# Patient Record
Sex: MALE | Race: WHITE | NOT HISPANIC OR LATINO | URBAN - METROPOLITAN AREA
[De-identification: names, ages, dates, MRNs, and addresses within clinical notes are randomized per-mention and may not be internally consistent; named-entity substitution may affect disease eponyms.]

---

## 2019-01-14 ENCOUNTER — APPOINTMENT (RX ONLY)
Dept: URBAN - METROPOLITAN AREA CLINIC 4 | Facility: CLINIC | Age: 74
Setting detail: DERMATOLOGY
End: 2019-01-14

## 2019-01-14 DIAGNOSIS — D485 NEOPLASM OF UNCERTAIN BEHAVIOR OF SKIN: ICD-10-CM

## 2019-01-14 DIAGNOSIS — Z85.828 PERSONAL HISTORY OF OTHER MALIGNANT NEOPLASM OF SKIN: ICD-10-CM

## 2019-01-14 DIAGNOSIS — L82.0 INFLAMED SEBORRHEIC KERATOSIS: ICD-10-CM

## 2019-01-14 DIAGNOSIS — D18.0 HEMANGIOMA: ICD-10-CM

## 2019-01-14 PROBLEM — D48.5 NEOPLASM OF UNCERTAIN BEHAVIOR OF SKIN: Status: ACTIVE | Noted: 2019-01-14

## 2019-01-14 PROBLEM — D23.39 OTHER BENIGN NEOPLASM OF SKIN OF OTHER PARTS OF FACE: Status: ACTIVE | Noted: 2019-01-14

## 2019-01-14 PROBLEM — D18.01 HEMANGIOMA OF SKIN AND SUBCUTANEOUS TISSUE: Status: ACTIVE | Noted: 2019-01-14

## 2019-01-14 PROCEDURE — 99213 OFFICE O/P EST LOW 20 MIN: CPT | Mod: 25

## 2019-01-14 PROCEDURE — ? LIQUID NITROGEN

## 2019-01-14 PROCEDURE — 11103 TANGNTL BX SKIN EA SEP/ADDL: CPT

## 2019-01-14 PROCEDURE — ? COUNSELING

## 2019-01-14 PROCEDURE — 17110 DESTRUCTION B9 LES UP TO 14: CPT

## 2019-01-14 PROCEDURE — 11102 TANGNTL BX SKIN SINGLE LES: CPT | Mod: 59

## 2019-01-14 PROCEDURE — ? BIOPSY BY SHAVE METHOD

## 2019-01-14 ASSESSMENT — LOCATION SIMPLE DESCRIPTION DERM
LOCATION SIMPLE: LEFT SHOULDER
LOCATION SIMPLE: LEFT FOREHEAD
LOCATION SIMPLE: ABDOMEN
LOCATION SIMPLE: RIGHT SHOULDER
LOCATION SIMPLE: RIGHT FOREHEAD
LOCATION SIMPLE: RIGHT FOREARM

## 2019-01-14 ASSESSMENT — LOCATION DETAILED DESCRIPTION DERM
LOCATION DETAILED: PERIUMBILICAL SKIN
LOCATION DETAILED: LEFT LATERAL FOREHEAD
LOCATION DETAILED: RIGHT ANTERIOR SHOULDER
LOCATION DETAILED: RIGHT PROXIMAL DORSAL FOREARM
LOCATION DETAILED: RIGHT INFERIOR FOREHEAD
LOCATION DETAILED: LEFT INFERIOR FOREHEAD
LOCATION DETAILED: RIGHT LATERAL ABDOMEN
LOCATION DETAILED: LEFT POSTERIOR SHOULDER

## 2019-01-14 ASSESSMENT — LOCATION ZONE DERM
LOCATION ZONE: TRUNK
LOCATION ZONE: FACE
LOCATION ZONE: ARM

## 2019-01-14 NOTE — PROCEDURE: BIOPSY BY SHAVE METHOD
Consent: Written consent was obtained and risks were reviewed including but not limited to scarring, infection, bleeding, scabbing, incomplete removal, nerve damage and allergy to anesthesia.
Detail Level: Detailed
Lab: [id number]
Bill For Surgical Tray: no
Size Of Lesion In Cm: 0
Lab Facility: 
Cryotherapy Text: The wound bed was treated with cryotherapy after the biopsy was performed.
Anesthesia Type: 1% lidocaine with epinephrine
Wound Care: Petrolatum
Biopsy Type: H and E
Billing Type: Third-Party Bill
Depth Of Biopsy: dermis
Electrodesiccation Text: The wound bed was treated with electrodesiccation after the biopsy was performed.
Electrodesiccation And Curettage Text: The wound bed was treated with electrodesiccation and curettage after the biopsy was performed.
Type Of Destruction Used: Curettage
Anesthesia Volume In Cc: 0.5
Dressing: bandage
Post-Care Instructions: I reviewed with the patient in detail post-care instructions. Patient is to keep the biopsy site dry overnight, and then apply bacitracin twice daily until healed. Patient may apply hydrogen peroxide soaks to remove any crusting.
Notification Instructions: Patient will be notified of biopsy results. However, patient instructed to call the office if not contacted within 2 weeks.
Was A Bandage Applied: Yes
Silver Nitrate Text: The wound bed was treated with silver nitrate after the biopsy was performed.
Curettage Text: The wound bed was treated with curettage after the biopsy was performed.
Hemostasis: Drysol
Biopsy Method: Personna blade

## 2019-02-15 ENCOUNTER — APPOINTMENT (RX ONLY)
Dept: URBAN - METROPOLITAN AREA CLINIC 4 | Facility: CLINIC | Age: 74
Setting detail: DERMATOLOGY
End: 2019-02-15

## 2019-02-15 DIAGNOSIS — L57.0 ACTINIC KERATOSIS: ICD-10-CM

## 2019-02-15 PROBLEM — C44.612 BASAL CELL CARCINOMA OF SKIN OF RIGHT UPPER LIMB, INCLUDING SHOULDER: Status: ACTIVE | Noted: 2019-02-15

## 2019-02-15 PROCEDURE — 17000 DESTRUCT PREMALG LESION: CPT

## 2019-02-15 PROCEDURE — ? CURETTAGE AND DESTRUCTION

## 2019-02-15 PROCEDURE — ? LIQUID NITROGEN

## 2019-02-15 PROCEDURE — 17262 DSTRJ MAL LES T/A/L 1.1-2.0: CPT | Mod: 59

## 2019-02-15 ASSESSMENT — LOCATION ZONE DERM: LOCATION ZONE: ARM

## 2019-02-15 ASSESSMENT — LOCATION SIMPLE DESCRIPTION DERM: LOCATION SIMPLE: RIGHT SHOULDER

## 2019-02-15 ASSESSMENT — LOCATION DETAILED DESCRIPTION DERM: LOCATION DETAILED: RIGHT POSTERIOR SHOULDER

## 2019-02-15 NOTE — PROCEDURE: CURETTAGE AND DESTRUCTION
Anesthesia Volume In Cc: 0.5
What Was Performed First?: Curettage
Add Intralesional Injection: No
Bill As A Line Item Or As Units: Line Item
Size Of Lesion In Cm: 0.8
Detail Level: Detailed
Cautery Type: electrodesiccation
Post-Care Instructions: I reviewed with the patient in detail post-care instructions. Patient is to keep the area dry for 48 hours, and not to engage in any swimming until the area is healed. Should the patient develop any fevers, chills, bleeding, severe pain patient will contact the office immediately.
Biopsy Photograph Reviewed: Yes
Additional Information: (Optional): The wound was cleaned, and a pressure dressing was applied.  The patient received detailed post-op instructions.
Anesthesia Type: 1% lidocaine with epinephrine
Total Volume (Ccs): 1
Consent was obtained from the patient. The risks, benefits and alternatives to therapy were discussed in detail. Specifically, the risks of infection, scarring, bleeding, prolonged wound healing, nerve injury, incomplete removal, allergy to anesthesia and recurrence were addressed. Alternatives to ED&C, such as: surgical removal and XRT were also discussed.  Prior to the procedure, the treatment site was clearly identified and confirmed by the patient. All components of Universal Protocol/PAUSE Rule completed.
Concentration (Mg/Ml Or Millions Of Plaque Forming Units/Cc): 0.01
Number Of Curettages: 2
Size Of Lesion After Curettage: 1.2

## 2019-02-15 NOTE — PROCEDURE: LIQUID NITROGEN
Number Of Freeze-Thaw Cycles: 2 freeze-thaw cycles
Post-Care Instructions: I reviewed with the patient in detail post-care instructions. Patient is to wear sunprotection, and avoid picking at any of the treated lesions. Pt may apply Vaseline to crusted or scabbing areas.
Detail Level: Detailed
Render Post-Care Instructions In Note?: no
Consent: The patient's consent was obtained including but not limited to risks of blistering, scarring, darker or lighter pigmentary change, and recurrence.
Duration Of Freeze Thaw-Cycle (Seconds): 0

## 2019-05-30 ENCOUNTER — APPOINTMENT (RX ONLY)
Dept: URBAN - METROPOLITAN AREA CLINIC 4 | Facility: CLINIC | Age: 74
Setting detail: DERMATOLOGY
End: 2019-05-30

## 2019-05-30 DIAGNOSIS — Z85.828 PERSONAL HISTORY OF OTHER MALIGNANT NEOPLASM OF SKIN: ICD-10-CM

## 2019-05-30 DIAGNOSIS — L82.1 OTHER SEBORRHEIC KERATOSIS: ICD-10-CM

## 2019-05-30 DIAGNOSIS — L57.0 ACTINIC KERATOSIS: ICD-10-CM

## 2019-05-30 PROBLEM — D48.5 NEOPLASM OF UNCERTAIN BEHAVIOR OF SKIN: Status: ACTIVE | Noted: 2019-05-30

## 2019-05-30 PROCEDURE — ? LIQUID NITROGEN

## 2019-05-30 PROCEDURE — 17003 DESTRUCT PREMALG LES 2-14: CPT

## 2019-05-30 PROCEDURE — ? COUNSELING

## 2019-05-30 PROCEDURE — 11102 TANGNTL BX SKIN SINGLE LES: CPT

## 2019-05-30 PROCEDURE — ? BIOPSY BY SHAVE METHOD

## 2019-05-30 PROCEDURE — 99212 OFFICE O/P EST SF 10 MIN: CPT | Mod: 25

## 2019-05-30 PROCEDURE — 17000 DESTRUCT PREMALG LESION: CPT | Mod: 59

## 2019-05-30 ASSESSMENT — LOCATION DETAILED DESCRIPTION DERM
LOCATION DETAILED: RIGHT PROXIMAL DORSAL FOREARM
LOCATION DETAILED: LEFT DISTAL POSTERIOR UPPER ARM
LOCATION DETAILED: LEFT PROXIMAL DORSAL FOREARM
LOCATION DETAILED: LEFT VENTRAL DISTAL FOREARM
LOCATION DETAILED: LEFT CENTRAL MALAR CHEEK

## 2019-05-30 ASSESSMENT — LOCATION ZONE DERM
LOCATION ZONE: FACE
LOCATION ZONE: ARM

## 2019-05-30 ASSESSMENT — LOCATION SIMPLE DESCRIPTION DERM
LOCATION SIMPLE: LEFT UPPER ARM
LOCATION SIMPLE: LEFT FOREARM
LOCATION SIMPLE: LEFT CHEEK
LOCATION SIMPLE: RIGHT FOREARM

## 2019-05-30 NOTE — PROCEDURE: BIOPSY BY SHAVE METHOD
Notification Instructions: Patient will be notified of biopsy results. However, patient instructed to call the office if not contacted within 2 weeks.
Curettage Text: The wound bed was treated with curettage after the biopsy was performed.
Detail Level: Detailed
Hemostasis: Aluminum Chloride and Electrocautery
Post-Care Instructions: I reviewed with the patient in detail post-care instructions. Patient is to keep the biopsy site dry overnight, and then apply bacitracin twice daily until healed. Patient may apply hydrogen peroxide soaks to remove any crusting.
Silver Nitrate Text: The wound bed was treated with silver nitrate after the biopsy was performed.
Depth Of Biopsy: dermis
Lab Facility: 
Additional Anesthesia Volume In Cc (Will Not Render If 0): 0
Biopsy Type: H and E
Consent: Written consent was obtained and risks were reviewed including but not limited to scarring, infection, bleeding, scabbing, incomplete removal, nerve damage and allergy to anesthesia.
Anesthesia Type: 1% lidocaine with epinephrine
Cryotherapy Text: The wound bed was treated with cryotherapy after the biopsy was performed.
Destruction After The Procedure: No
Size Of Lesion In Cm: 1
Lab: [id number]
Electrodesiccation Text: The wound bed was treated with electrodesiccation after the biopsy was performed.
Wound Care: Aquaphor
Dressing: Band-Aid
Biopsy Method: 15 blade
Electrodesiccation And Curettage Text: The wound bed was treated with electrodesiccation and curettage after the biopsy was performed.
Billing Type: Third-Party Bill
Type Of Destruction Used: Curettage
Was A Bandage Applied: Yes

## 2019-05-30 NOTE — PROCEDURE: LIQUID NITROGEN
Render Note In Bullet Format When Appropriate: No
Post-Care Instructions: I reviewed with the patient in detail post-care instructions. Patient is to wear sunprotection, and avoid picking at any of the treated lesions. Pt may apply Vaseline  or Aquaphor to crusted or scabbing areas.
Detail Level: Detailed
Duration Of Freeze Thaw-Cycle (Seconds): 0
Consent: The patient's consent was obtained including but not limited to risks of crusting, scabbing, blistering, scarring, darker or lighter pigmentary change, recurrence, incomplete removal and infection.

## 2019-08-21 ENCOUNTER — APPOINTMENT (RX ONLY)
Dept: URBAN - METROPOLITAN AREA CLINIC 4 | Facility: CLINIC | Age: 74
Setting detail: DERMATOLOGY
End: 2019-08-21

## 2019-08-21 DIAGNOSIS — D18.0 HEMANGIOMA: ICD-10-CM

## 2019-08-21 DIAGNOSIS — L57.8 OTHER SKIN CHANGES DUE TO CHRONIC EXPOSURE TO NONIONIZING RADIATION: ICD-10-CM

## 2019-08-21 DIAGNOSIS — L57.0 ACTINIC KERATOSIS: ICD-10-CM

## 2019-08-21 DIAGNOSIS — L82.0 INFLAMED SEBORRHEIC KERATOSIS: ICD-10-CM

## 2019-08-21 PROBLEM — D23.39 OTHER BENIGN NEOPLASM OF SKIN OF OTHER PARTS OF FACE: Status: ACTIVE | Noted: 2019-08-21

## 2019-08-21 PROBLEM — D18.01 HEMANGIOMA OF SKIN AND SUBCUTANEOUS TISSUE: Status: ACTIVE | Noted: 2019-08-21

## 2019-08-21 PROCEDURE — ? DEFER

## 2019-08-21 PROCEDURE — 17003 DESTRUCT PREMALG LES 2-14: CPT | Mod: 59

## 2019-08-21 PROCEDURE — 99213 OFFICE O/P EST LOW 20 MIN: CPT | Mod: 25

## 2019-08-21 PROCEDURE — 17000 DESTRUCT PREMALG LESION: CPT | Mod: 59

## 2019-08-21 PROCEDURE — 17110 DESTRUCTION B9 LES UP TO 14: CPT

## 2019-08-21 PROCEDURE — ? LIQUID NITROGEN

## 2019-08-21 ASSESSMENT — LOCATION ZONE DERM
LOCATION ZONE: FACE
LOCATION ZONE: TRUNK

## 2019-08-21 ASSESSMENT — LOCATION DETAILED DESCRIPTION DERM
LOCATION DETAILED: SUPERIOR THORACIC SPINE
LOCATION DETAILED: RIGHT CENTRAL EYEBROW
LOCATION DETAILED: RIGHT CENTRAL TEMPLE
LOCATION DETAILED: STERNUM
LOCATION DETAILED: LEFT MEDIAL FOREHEAD

## 2019-08-21 ASSESSMENT — LOCATION SIMPLE DESCRIPTION DERM
LOCATION SIMPLE: CHEST
LOCATION SIMPLE: RIGHT TEMPLE
LOCATION SIMPLE: UPPER BACK
LOCATION SIMPLE: RIGHT EYEBROW
LOCATION SIMPLE: LEFT FOREHEAD

## 2019-08-21 NOTE — PROCEDURE: LIQUID NITROGEN
Detail Level: Detailed
Consent: The patient's consent was obtained including but not limited to risks of crusting, scabbing, blistering, scarring, darker or lighter pigmentary change, recurrence, incomplete removal and infection.
Add 52 Modifier (Optional): no
Medical Necessity Information: It is in your best interest to select a reason for this procedure from the list below. All of these items fulfill various CMS LCD requirements except the new and changing color options.
Medical Necessity Clause: This procedure was medically necessary because the lesions that were treated were:
Post-Care Instructions: I reviewed with the patient in detail post-care instructions. Patient is to wear sunprotection, and avoid picking at any of the treated lesions. Pt may apply Vaseline to crusted or scabbing areas.
Include Z78.9 (Other Specified Conditions Influencing Health Status) As An Associated Diagnosis?: Yes
Duration Of Freeze Thaw-Cycle (Seconds): 0
Consent: The patient's consent was obtained including but not limited to risks of blistering, scarring, darker or lighter pigmentary change, and recurrence.
Number Of Freeze-Thaw Cycles: 2 freeze-thaw cycles

## 2019-08-21 NOTE — PROCEDURE: DEFER
Introduction Text (Please End With A Colon): The following procedure was deferred:
Detail Level: Zone
Instructions (Optional): Mild today, will re-evaluate at follow up in 12 months

## 2020-07-12 ENCOUNTER — APPOINTMENT (OUTPATIENT)
Dept: RADIOLOGY | Facility: MEDICAL CENTER | Age: 75
DRG: 038 | End: 2020-07-12
Attending: PSYCHIATRY & NEUROLOGY
Payer: MEDICARE

## 2020-07-12 ENCOUNTER — APPOINTMENT (OUTPATIENT)
Dept: RADIOLOGY | Facility: MEDICAL CENTER | Age: 75
DRG: 038 | End: 2020-07-12
Attending: EMERGENCY MEDICINE
Payer: MEDICARE

## 2020-07-12 ENCOUNTER — HOSPITAL ENCOUNTER (INPATIENT)
Facility: MEDICAL CENTER | Age: 75
LOS: 2 days | DRG: 038 | End: 2020-07-14
Attending: EMERGENCY MEDICINE | Admitting: HOSPITALIST
Payer: MEDICARE

## 2020-07-12 DIAGNOSIS — R55 SYNCOPE, UNSPECIFIED SYNCOPE TYPE: ICD-10-CM

## 2020-07-12 DIAGNOSIS — I65.21 STENOSIS OF RIGHT CAROTID ARTERY: ICD-10-CM

## 2020-07-12 DIAGNOSIS — G45.9 TIA (TRANSIENT ISCHEMIC ATTACK): ICD-10-CM

## 2020-07-12 DIAGNOSIS — I10 HYPERTENSION, UNSPECIFIED TYPE: ICD-10-CM

## 2020-07-12 LAB
ABO + RH BLD: ABNORMAL
ABO GROUP BLD: ABNORMAL
ALBUMIN SERPL BCP-MCNC: 4.5 G/DL (ref 3.2–4.9)
ALBUMIN/GLOB SERPL: 1.8 G/DL
ALP SERPL-CCNC: 107 U/L (ref 30–99)
ALT SERPL-CCNC: 21 U/L (ref 2–50)
AMMONIA PLAS-SCNC: <10 UMOL/L (ref 11–45)
AMPHET UR QL SCN: NEGATIVE
ANION GAP SERPL CALC-SCNC: 18 MMOL/L (ref 7–16)
APPEARANCE UR: CLEAR
APTT PPP: 25.2 SEC (ref 24.7–36)
AST SERPL-CCNC: 23 U/L (ref 12–45)
BARBITURATES UR QL SCN: NEGATIVE
BASOPHILS # BLD AUTO: 0.4 % (ref 0–1.8)
BASOPHILS # BLD: 0.03 K/UL (ref 0–0.12)
BENZODIAZ UR QL SCN: NEGATIVE
BILIRUB SERPL-MCNC: 0.5 MG/DL (ref 0.1–1.5)
BILIRUB UR QL STRIP.AUTO: NEGATIVE
BLD GP AB SCN SERPL QL: ABNORMAL
BUN SERPL-MCNC: 8 MG/DL (ref 8–22)
BZE UR QL SCN: NEGATIVE
CALCIUM SERPL-MCNC: 9.2 MG/DL (ref 8.5–10.5)
CANNABINOIDS UR QL SCN: NEGATIVE
CHLORIDE SERPL-SCNC: 88 MMOL/L (ref 96–112)
CO2 SERPL-SCNC: 20 MMOL/L (ref 20–33)
COLOR UR: YELLOW
CREAT SERPL-MCNC: 0.83 MG/DL (ref 0.5–1.4)
EKG IMPRESSION: NORMAL
EOSINOPHIL # BLD AUTO: 0.06 K/UL (ref 0–0.51)
EOSINOPHIL NFR BLD: 0.9 % (ref 0–6.9)
ERYTHROCYTE [DISTWIDTH] IN BLOOD BY AUTOMATED COUNT: 40.9 FL (ref 35.9–50)
GLOBULIN SER CALC-MCNC: 2.5 G/DL (ref 1.9–3.5)
GLUCOSE SERPL-MCNC: 151 MG/DL (ref 65–99)
GLUCOSE UR STRIP.AUTO-MCNC: NEGATIVE MG/DL
HCT VFR BLD AUTO: 46.5 % (ref 42–52)
HGB BLD-MCNC: 16 G/DL (ref 14–18)
IMM GRANULOCYTES # BLD AUTO: 0.02 K/UL (ref 0–0.11)
IMM GRANULOCYTES NFR BLD AUTO: 0.3 % (ref 0–0.9)
INR PPP: 0.91 (ref 0.87–1.13)
KETONES UR STRIP.AUTO-MCNC: NEGATIVE MG/DL
LEUKOCYTE ESTERASE UR QL STRIP.AUTO: NEGATIVE
LYMPHOCYTES # BLD AUTO: 0.76 K/UL (ref 1–4.8)
LYMPHOCYTES NFR BLD: 11.3 % (ref 22–41)
MAGNESIUM SERPL-MCNC: 1.9 MG/DL (ref 1.5–2.5)
MCH RBC QN AUTO: 31.1 PG (ref 27–33)
MCHC RBC AUTO-ENTMCNC: 34.4 G/DL (ref 33.7–35.3)
MCV RBC AUTO: 90.5 FL (ref 81.4–97.8)
METHADONE UR QL SCN: NEGATIVE
MICRO URNS: NORMAL
MONOCYTES # BLD AUTO: 0.75 K/UL (ref 0–0.85)
MONOCYTES NFR BLD AUTO: 11.2 % (ref 0–13.4)
NEUTROPHILS # BLD AUTO: 5.09 K/UL (ref 1.82–7.42)
NEUTROPHILS NFR BLD: 75.9 % (ref 44–72)
NITRITE UR QL STRIP.AUTO: NEGATIVE
NRBC # BLD AUTO: 0 K/UL
NRBC BLD-RTO: 0 /100 WBC
OPIATES UR QL SCN: NEGATIVE
OXYCODONE UR QL SCN: NEGATIVE
PCP UR QL SCN: NEGATIVE
PH UR STRIP.AUTO: 8 [PH] (ref 5–8)
PLATELET # BLD AUTO: 188 K/UL (ref 164–446)
PMV BLD AUTO: 9 FL (ref 9–12.9)
POTASSIUM SERPL-SCNC: 4.2 MMOL/L (ref 3.6–5.5)
PROPOXYPH UR QL SCN: NEGATIVE
PROT SERPL-MCNC: 7 G/DL (ref 6–8.2)
PROT UR QL STRIP: NEGATIVE MG/DL
PROTHROMBIN TIME: 12.6 SEC (ref 12–14.6)
RBC # BLD AUTO: 5.14 M/UL (ref 4.7–6.1)
RBC UR QL AUTO: NEGATIVE
RH BLD: ABNORMAL
SODIUM SERPL-SCNC: 126 MMOL/L (ref 135–145)
SP GR UR STRIP.AUTO: 1.03
T4 FREE SERPL-MCNC: 0.85 NG/DL (ref 0.93–1.7)
TROPONIN T SERPL-MCNC: 10 NG/L (ref 6–19)
TSH SERPL DL<=0.005 MIU/L-ACNC: 11.7 UIU/ML (ref 0.38–5.33)
UROBILINOGEN UR STRIP.AUTO-MCNC: 0.2 MG/DL
VIT B12 SERPL-MCNC: 418 PG/ML (ref 211–911)
WBC # BLD AUTO: 6.7 K/UL (ref 4.8–10.8)

## 2020-07-12 PROCEDURE — 94760 N-INVAS EAR/PLS OXIMETRY 1: CPT

## 2020-07-12 PROCEDURE — 82140 ASSAY OF AMMONIA: CPT

## 2020-07-12 PROCEDURE — 96374 THER/PROPH/DIAG INJ IV PUSH: CPT

## 2020-07-12 PROCEDURE — 80307 DRUG TEST PRSMV CHEM ANLYZR: CPT

## 2020-07-12 PROCEDURE — 71045 X-RAY EXAM CHEST 1 VIEW: CPT

## 2020-07-12 PROCEDURE — 700105 HCHG RX REV CODE 258: Performed by: EMERGENCY MEDICINE

## 2020-07-12 PROCEDURE — 0042T CT-CEREBRAL PERFUSION ANALYSIS: CPT

## 2020-07-12 PROCEDURE — 770020 HCHG ROOM/CARE - TELE (206)

## 2020-07-12 PROCEDURE — 99285 EMERGENCY DEPT VISIT HI MDM: CPT

## 2020-07-12 PROCEDURE — 83735 ASSAY OF MAGNESIUM: CPT

## 2020-07-12 PROCEDURE — 700102 HCHG RX REV CODE 250 W/ 637 OVERRIDE(OP): Performed by: EMERGENCY MEDICINE

## 2020-07-12 PROCEDURE — 81003 URINALYSIS AUTO W/O SCOPE: CPT

## 2020-07-12 PROCEDURE — 99223 1ST HOSP IP/OBS HIGH 75: CPT | Performed by: PSYCHIATRY & NEUROLOGY

## 2020-07-12 PROCEDURE — 70450 CT HEAD/BRAIN W/O DYE: CPT

## 2020-07-12 PROCEDURE — 84439 ASSAY OF FREE THYROXINE: CPT

## 2020-07-12 PROCEDURE — 85610 PROTHROMBIN TIME: CPT

## 2020-07-12 PROCEDURE — 86900 BLOOD TYPING SEROLOGIC ABO: CPT

## 2020-07-12 PROCEDURE — 93005 ELECTROCARDIOGRAM TRACING: CPT | Performed by: EMERGENCY MEDICINE

## 2020-07-12 PROCEDURE — 70460 CT HEAD/BRAIN W/DYE: CPT

## 2020-07-12 PROCEDURE — 84443 ASSAY THYROID STIM HORMONE: CPT

## 2020-07-12 PROCEDURE — 85730 THROMBOPLASTIN TIME PARTIAL: CPT

## 2020-07-12 PROCEDURE — 82607 VITAMIN B-12: CPT

## 2020-07-12 PROCEDURE — 700101 HCHG RX REV CODE 250: Performed by: EMERGENCY MEDICINE

## 2020-07-12 PROCEDURE — 86850 RBC ANTIBODY SCREEN: CPT

## 2020-07-12 PROCEDURE — 700117 HCHG RX CONTRAST REV CODE 255: Performed by: EMERGENCY MEDICINE

## 2020-07-12 PROCEDURE — A9270 NON-COVERED ITEM OR SERVICE: HCPCS | Performed by: EMERGENCY MEDICINE

## 2020-07-12 PROCEDURE — 84484 ASSAY OF TROPONIN QUANT: CPT

## 2020-07-12 PROCEDURE — 86901 BLOOD TYPING SEROLOGIC RH(D): CPT

## 2020-07-12 PROCEDURE — 70496 CT ANGIOGRAPHY HEAD: CPT

## 2020-07-12 PROCEDURE — 80053 COMPREHEN METABOLIC PANEL: CPT

## 2020-07-12 PROCEDURE — 85025 COMPLETE CBC W/AUTO DIFF WBC: CPT

## 2020-07-12 PROCEDURE — 70498 CT ANGIOGRAPHY NECK: CPT

## 2020-07-12 RX ORDER — ATORVASTATIN CALCIUM 80 MG/1
80 TABLET, FILM COATED ORAL EVERY EVENING
Status: DISCONTINUED | OUTPATIENT
Start: 2020-07-12 | End: 2020-07-13

## 2020-07-12 RX ORDER — CLOPIDOGREL BISULFATE 75 MG/1
75 TABLET ORAL DAILY
Status: DISCONTINUED | OUTPATIENT
Start: 2020-07-12 | End: 2020-07-13

## 2020-07-12 RX ORDER — ASPIRIN 81 MG/1
324 TABLET, CHEWABLE ORAL DAILY
Status: DISCONTINUED | OUTPATIENT
Start: 2020-07-12 | End: 2020-07-12

## 2020-07-12 RX ORDER — ASPIRIN 81 MG/1
81 TABLET, CHEWABLE ORAL ONCE
Status: COMPLETED | OUTPATIENT
Start: 2020-07-12 | End: 2020-07-13

## 2020-07-12 RX ORDER — LOSARTAN POTASSIUM 50 MG/1
50 TABLET ORAL DAILY
Status: ON HOLD | COMMUNITY
Start: 2020-05-12 | End: 2020-07-14 | Stop reason: SDUPTHER

## 2020-07-12 RX ORDER — CLOPIDOGREL BISULFATE 75 MG/1
75 TABLET ORAL ONCE
Status: COMPLETED | OUTPATIENT
Start: 2020-07-12 | End: 2020-07-12

## 2020-07-12 RX ORDER — LOSARTAN POTASSIUM 25 MG/1
25 TABLET ORAL DAILY
COMMUNITY
End: 2020-07-12

## 2020-07-12 RX ORDER — POLYETHYLENE GLYCOL 3350 17 G/17G
1 POWDER, FOR SOLUTION ORAL
Status: DISCONTINUED | OUTPATIENT
Start: 2020-07-12 | End: 2020-07-14 | Stop reason: HOSPADM

## 2020-07-12 RX ORDER — LABETALOL HYDROCHLORIDE 5 MG/ML
10 INJECTION, SOLUTION INTRAVENOUS ONCE
Status: COMPLETED | OUTPATIENT
Start: 2020-07-12 | End: 2020-07-12

## 2020-07-12 RX ORDER — BISACODYL 10 MG
10 SUPPOSITORY, RECTAL RECTAL
Status: DISCONTINUED | OUTPATIENT
Start: 2020-07-12 | End: 2020-07-14 | Stop reason: HOSPADM

## 2020-07-12 RX ORDER — AMOXICILLIN 250 MG
2 CAPSULE ORAL 2 TIMES DAILY
Status: DISCONTINUED | OUTPATIENT
Start: 2020-07-12 | End: 2020-07-14 | Stop reason: HOSPADM

## 2020-07-12 RX ORDER — SODIUM CHLORIDE, SODIUM LACTATE, POTASSIUM CHLORIDE, CALCIUM CHLORIDE 600; 310; 30; 20 MG/100ML; MG/100ML; MG/100ML; MG/100ML
1000 INJECTION, SOLUTION INTRAVENOUS ONCE
Status: COMPLETED | OUTPATIENT
Start: 2020-07-12 | End: 2020-07-12

## 2020-07-12 RX ADMIN — LABETALOL HYDROCHLORIDE 10 MG: 5 INJECTION, SOLUTION INTRAVENOUS at 20:42

## 2020-07-12 RX ADMIN — CLOPIDOGREL BISULFATE 75 MG: 75 TABLET ORAL at 21:18

## 2020-07-12 RX ADMIN — SODIUM CHLORIDE, POTASSIUM CHLORIDE, SODIUM LACTATE AND CALCIUM CHLORIDE 1000 ML: 600; 310; 30; 20 INJECTION, SOLUTION INTRAVENOUS at 20:46

## 2020-07-12 RX ADMIN — IOHEXOL 80 ML: 350 INJECTION, SOLUTION INTRAVENOUS at 19:55

## 2020-07-12 RX ADMIN — IOHEXOL 40 ML: 350 INJECTION, SOLUTION INTRAVENOUS at 19:54

## 2020-07-12 ASSESSMENT — LIFESTYLE VARIABLES
DOES PATIENT WANT TO STOP DRINKING: NO
ALCOHOL_USE: YES
ON A TYPICAL DAY WHEN YOU DRINK ALCOHOL HOW MANY DRINKS DO YOU HAVE: 1
HAVE YOU EVER FELT YOU SHOULD CUT DOWN ON YOUR DRINKING: NO
CONSUMPTION TOTAL: NEGATIVE
HAVE PEOPLE ANNOYED YOU BY CRITICIZING YOUR DRINKING: NO
TOTAL SCORE: 0
TOTAL SCORE: 0
HOW MANY TIMES IN THE PAST YEAR HAVE YOU HAD 5 OR MORE DRINKS IN A DAY: 0
TOTAL SCORE: 0
AVERAGE NUMBER OF DAYS PER WEEK YOU HAVE A DRINK CONTAINING ALCOHOL: 1
EVER HAD A DRINK FIRST THING IN THE MORNING TO STEADY YOUR NERVES TO GET RID OF A HANGOVER: NO
EVER FELT BAD OR GUILTY ABOUT YOUR DRINKING: NO
EVER_SMOKED: YES

## 2020-07-12 ASSESSMENT — COGNITIVE AND FUNCTIONAL STATUS - GENERAL
DAILY ACTIVITIY SCORE: 24
SUGGESTED CMS G CODE MODIFIER DAILY ACTIVITY: CH
SUGGESTED CMS G CODE MODIFIER MOBILITY: CH
MOBILITY SCORE: 24

## 2020-07-12 ASSESSMENT — FIBROSIS 4 INDEX: FIB4 SCORE: 1.98

## 2020-07-12 ASSESSMENT — PATIENT HEALTH QUESTIONNAIRE - PHQ9
2. FEELING DOWN, DEPRESSED, IRRITABLE, OR HOPELESS: NOT AT ALL
SUM OF ALL RESPONSES TO PHQ9 QUESTIONS 1 AND 2: 0
1. LITTLE INTEREST OR PLEASURE IN DOING THINGS: NOT AT ALL

## 2020-07-13 ENCOUNTER — ANESTHESIA (OUTPATIENT)
Dept: SURGERY | Facility: MEDICAL CENTER | Age: 75
DRG: 038 | End: 2020-07-13
Payer: MEDICARE

## 2020-07-13 ENCOUNTER — ANESTHESIA EVENT (OUTPATIENT)
Dept: SURGERY | Facility: MEDICAL CENTER | Age: 75
DRG: 038 | End: 2020-07-13
Payer: MEDICARE

## 2020-07-13 ENCOUNTER — APPOINTMENT (OUTPATIENT)
Dept: CARDIOLOGY | Facility: MEDICAL CENTER | Age: 75
DRG: 038 | End: 2020-07-13
Attending: NURSE PRACTITIONER
Payer: MEDICARE

## 2020-07-13 DIAGNOSIS — G45.1 TIA INVOLVING RIGHT INTERNAL CAROTID ARTERY: ICD-10-CM

## 2020-07-13 PROBLEM — I10 HTN (HYPERTENSION): Status: ACTIVE | Noted: 2020-07-13

## 2020-07-13 PROBLEM — E03.9 HYPOTHYROIDISM: Status: ACTIVE | Noted: 2020-07-13

## 2020-07-13 PROBLEM — E87.1 HYPONATREMIA: Status: ACTIVE | Noted: 2020-07-13

## 2020-07-13 PROBLEM — I63.9 ACUTE ISCHEMIC STROKE (HCC): Status: ACTIVE | Noted: 2020-07-13

## 2020-07-13 PROBLEM — I63.9 ACUTE ISCHEMIC STROKE (HCC): Chronic | Status: ACTIVE | Noted: 2020-07-13

## 2020-07-13 PROBLEM — E78.5 HYPERLIPIDEMIA: Status: ACTIVE | Noted: 2020-07-13

## 2020-07-13 LAB
ALBUMIN SERPL BCP-MCNC: 4 G/DL (ref 3.2–4.9)
ALBUMIN/GLOB SERPL: 1.9 G/DL
ALP SERPL-CCNC: 88 U/L (ref 30–99)
ALT SERPL-CCNC: 20 U/L (ref 2–50)
ANION GAP SERPL CALC-SCNC: 14 MMOL/L (ref 7–16)
AST SERPL-CCNC: 18 U/L (ref 12–45)
BILIRUB SERPL-MCNC: 0.5 MG/DL (ref 0.1–1.5)
BUN SERPL-MCNC: 8 MG/DL (ref 8–22)
CALCIUM SERPL-MCNC: 8.7 MG/DL (ref 8.5–10.5)
CHLORIDE SERPL-SCNC: 92 MMOL/L (ref 96–112)
CHOLEST SERPL-MCNC: 165 MG/DL (ref 100–199)
CO2 SERPL-SCNC: 22 MMOL/L (ref 20–33)
COVID ORDER STATUS COVID19: NORMAL
CREAT SERPL-MCNC: 0.66 MG/DL (ref 0.5–1.4)
CREAT UR-MCNC: 37.08 MG/DL
EKG IMPRESSION: NORMAL
ERYTHROCYTE [DISTWIDTH] IN BLOOD BY AUTOMATED COUNT: 42.6 FL (ref 35.9–50)
EST. AVERAGE GLUCOSE BLD GHB EST-MCNC: 120 MG/DL
GLOBULIN SER CALC-MCNC: 2.1 G/DL (ref 1.9–3.5)
GLUCOSE SERPL-MCNC: 106 MG/DL (ref 65–99)
HBA1C MFR BLD: 5.8 % (ref 0–5.6)
HCT VFR BLD AUTO: 48.3 % (ref 42–52)
HDLC SERPL-MCNC: 53 MG/DL
HGB BLD-MCNC: 16.5 G/DL (ref 14–18)
LDLC SERPL CALC-MCNC: 100 MG/DL
LV EJECT FRACT  99904: 75
LV EJECT FRACT MOD 2C 99903: 62.03
LV EJECT FRACT MOD 4C 99902: 62.57
LV EJECT FRACT MOD BP 99901: 62.2
MCH RBC QN AUTO: 31.3 PG (ref 27–33)
MCHC RBC AUTO-ENTMCNC: 34.2 G/DL (ref 33.7–35.3)
MCV RBC AUTO: 91.7 FL (ref 81.4–97.8)
PATHOLOGY CONSULT NOTE: NORMAL
PLATELET # BLD AUTO: 148 K/UL (ref 164–446)
PMV BLD AUTO: 8.9 FL (ref 9–12.9)
POTASSIUM SERPL-SCNC: 5 MMOL/L (ref 3.6–5.5)
PROT SERPL-MCNC: 6.1 G/DL (ref 6–8.2)
RBC # BLD AUTO: 5.27 M/UL (ref 4.7–6.1)
SARS-COV-2 RDRP RESP QL NAA+PROBE: NOTDETECTED
SODIUM SERPL-SCNC: 125 MMOL/L (ref 135–145)
SODIUM SERPL-SCNC: 128 MMOL/L (ref 135–145)
SODIUM UR-SCNC: 80 MMOL/L
SPECIMEN SOURCE: NORMAL
TRIGL SERPL-MCNC: 62 MG/DL (ref 0–149)
WBC # BLD AUTO: 4.4 K/UL (ref 4.8–10.8)

## 2020-07-13 PROCEDURE — 500257: Performed by: SURGERY

## 2020-07-13 PROCEDURE — 700105 HCHG RX REV CODE 258: Performed by: SURGERY

## 2020-07-13 PROCEDURE — 700105 HCHG RX REV CODE 258: Performed by: STUDENT IN AN ORGANIZED HEALTH CARE EDUCATION/TRAINING PROGRAM

## 2020-07-13 PROCEDURE — 500389 HCHG DRAIN, RESERVOIR SUCT JP 100CC: Performed by: SURGERY

## 2020-07-13 PROCEDURE — 501837 HCHG SUTURE CV: Performed by: SURGERY

## 2020-07-13 PROCEDURE — 03UM0KZ SUPPLEMENT RIGHT EXTERNAL CAROTID ARTERY WITH NONAUTOLOGOUS TISSUE SUBSTITUTE, OPEN APPROACH: ICD-10-PCS | Performed by: SURGERY

## 2020-07-13 PROCEDURE — 700101 HCHG RX REV CODE 250: Performed by: ANESTHESIOLOGY

## 2020-07-13 PROCEDURE — 36415 COLL VENOUS BLD VENIPUNCTURE: CPT

## 2020-07-13 PROCEDURE — 700111 HCHG RX REV CODE 636 W/ 250 OVERRIDE (IP): Performed by: ANESTHESIOLOGY

## 2020-07-13 PROCEDURE — 160028 HCHG SURGERY MINUTES - 1ST 30 MINS LEVEL 3: Performed by: SURGERY

## 2020-07-13 PROCEDURE — 160009 HCHG ANES TIME/MIN: Performed by: SURGERY

## 2020-07-13 PROCEDURE — 03CM0ZZ EXTIRPATION OF MATTER FROM RIGHT EXTERNAL CAROTID ARTERY, OPEN APPROACH: ICD-10-PCS | Performed by: SURGERY

## 2020-07-13 PROCEDURE — 160035 HCHG PACU - 1ST 60 MINS PHASE I: Performed by: SURGERY

## 2020-07-13 PROCEDURE — 03CH0ZZ EXTIRPATION OF MATTER FROM RIGHT COMMON CAROTID ARTERY, OPEN APPROACH: ICD-10-PCS | Performed by: SURGERY

## 2020-07-13 PROCEDURE — 500368 HCHG DRAIN, 7MM FLAT-FLUTED: Performed by: SURGERY

## 2020-07-13 PROCEDURE — 95955 EEG DURING SURGERY: CPT | Performed by: SURGERY

## 2020-07-13 PROCEDURE — 95940 IONM IN OPERATNG ROOM 15 MIN: CPT | Performed by: SURGERY

## 2020-07-13 PROCEDURE — 80061 LIPID PANEL: CPT

## 2020-07-13 PROCEDURE — 700102 HCHG RX REV CODE 250 W/ 637 OVERRIDE(OP): Performed by: STUDENT IN AN ORGANIZED HEALTH CARE EDUCATION/TRAINING PROGRAM

## 2020-07-13 PROCEDURE — U0004 COV-19 TEST NON-CDC HGH THRU: HCPCS

## 2020-07-13 PROCEDURE — 501445 HCHG STAPLER, SKIN DISP: Performed by: SURGERY

## 2020-07-13 PROCEDURE — C9803 HOPD COVID-19 SPEC COLLECT: HCPCS | Performed by: SURGERY

## 2020-07-13 PROCEDURE — 700105 HCHG RX REV CODE 258: Performed by: ANESTHESIOLOGY

## 2020-07-13 PROCEDURE — 700101 HCHG RX REV CODE 250: Performed by: SURGERY

## 2020-07-13 PROCEDURE — 160048 HCHG OR STATISTICAL LEVEL 1-5: Performed by: SURGERY

## 2020-07-13 PROCEDURE — 97161 PT EVAL LOW COMPLEX 20 MIN: CPT

## 2020-07-13 PROCEDURE — 93010 ELECTROCARDIOGRAM REPORT: CPT | Performed by: INTERNAL MEDICINE

## 2020-07-13 PROCEDURE — 700101 HCHG RX REV CODE 250: Performed by: NURSE PRACTITIONER

## 2020-07-13 PROCEDURE — A9270 NON-COVERED ITEM OR SERVICE: HCPCS | Performed by: STUDENT IN AN ORGANIZED HEALTH CARE EDUCATION/TRAINING PROGRAM

## 2020-07-13 PROCEDURE — 99232 SBSQ HOSP IP/OBS MODERATE 35: CPT | Performed by: NURSE PRACTITIONER

## 2020-07-13 PROCEDURE — 99232 SBSQ HOSP IP/OBS MODERATE 35: CPT | Mod: GC | Performed by: HOSPITALIST

## 2020-07-13 PROCEDURE — 93005 ELECTROCARDIOGRAM TRACING: CPT | Performed by: SURGERY

## 2020-07-13 PROCEDURE — 93306 TTE W/DOPPLER COMPLETE: CPT

## 2020-07-13 PROCEDURE — 160039 HCHG SURGERY MINUTES - EA ADDL 1 MIN LEVEL 3: Performed by: SURGERY

## 2020-07-13 PROCEDURE — 03CK0ZZ EXTIRPATION OF MATTER FROM RIGHT INTERNAL CAROTID ARTERY, OPEN APPROACH: ICD-10-PCS | Performed by: SURGERY

## 2020-07-13 PROCEDURE — 110454 HCHG SHELL REV 250: Performed by: SURGERY

## 2020-07-13 PROCEDURE — 85027 COMPLETE CBC AUTOMATED: CPT

## 2020-07-13 PROCEDURE — 84295 ASSAY OF SERUM SODIUM: CPT

## 2020-07-13 PROCEDURE — 80053 COMPREHEN METABOLIC PANEL: CPT

## 2020-07-13 PROCEDURE — 95938 SOMATOSENSORY TESTING: CPT | Performed by: SURGERY

## 2020-07-13 PROCEDURE — C1768 GRAFT, VASCULAR: HCPCS | Performed by: SURGERY

## 2020-07-13 PROCEDURE — 97166 OT EVAL MOD COMPLEX 45 MIN: CPT

## 2020-07-13 PROCEDURE — A6402 STERILE GAUZE <= 16 SQ IN: HCPCS | Performed by: SURGERY

## 2020-07-13 PROCEDURE — 501838 HCHG SUTURE GENERAL: Performed by: SURGERY

## 2020-07-13 PROCEDURE — 82570 ASSAY OF URINE CREATININE: CPT

## 2020-07-13 PROCEDURE — 770020 HCHG ROOM/CARE - TELE (206)

## 2020-07-13 PROCEDURE — 92585 HCHG BER: CPT | Performed by: SURGERY

## 2020-07-13 PROCEDURE — 93306 TTE W/DOPPLER COMPLETE: CPT | Mod: 26 | Performed by: INTERNAL MEDICINE

## 2020-07-13 PROCEDURE — 160036 HCHG PACU - EA ADDL 30 MINS PHASE I: Performed by: SURGERY

## 2020-07-13 PROCEDURE — 84300 ASSAY OF URINE SODIUM: CPT

## 2020-07-13 PROCEDURE — 110372 HCHG SHELL REV 278: Performed by: SURGERY

## 2020-07-13 PROCEDURE — 160002 HCHG RECOVERY MINUTES (STAT): Performed by: SURGERY

## 2020-07-13 PROCEDURE — 83036 HEMOGLOBIN GLYCOSYLATED A1C: CPT

## 2020-07-13 PROCEDURE — 700111 HCHG RX REV CODE 636 W/ 250 OVERRIDE (IP): Performed by: SURGERY

## 2020-07-13 PROCEDURE — 88300 SURGICAL PATH GROSS: CPT

## 2020-07-13 DEVICE — PATCH .8X8CM XENOSURE BIOLOGIC VASCULAR---ORDER IN MULTIPLES OF 5---: Type: IMPLANTABLE DEVICE | Site: CAROTID | Status: FUNCTIONAL

## 2020-07-13 RX ORDER — HYDROMORPHONE HYDROCHLORIDE 1 MG/ML
0.1 INJECTION, SOLUTION INTRAMUSCULAR; INTRAVENOUS; SUBCUTANEOUS
Status: DISCONTINUED | OUTPATIENT
Start: 2020-07-13 | End: 2020-07-13 | Stop reason: HOSPADM

## 2020-07-13 RX ORDER — LABETALOL HYDROCHLORIDE 5 MG/ML
5 INJECTION, SOLUTION INTRAVENOUS
Status: DISCONTINUED | OUTPATIENT
Start: 2020-07-13 | End: 2020-07-13 | Stop reason: HOSPADM

## 2020-07-13 RX ORDER — HYDROMORPHONE HYDROCHLORIDE 1 MG/ML
0.2 INJECTION, SOLUTION INTRAMUSCULAR; INTRAVENOUS; SUBCUTANEOUS
Status: DISCONTINUED | OUTPATIENT
Start: 2020-07-13 | End: 2020-07-13 | Stop reason: HOSPADM

## 2020-07-13 RX ORDER — DEXAMETHASONE SODIUM PHOSPHATE 4 MG/ML
4 INJECTION, SOLUTION INTRA-ARTICULAR; INTRALESIONAL; INTRAMUSCULAR; INTRAVENOUS; SOFT TISSUE
Status: DISCONTINUED | OUTPATIENT
Start: 2020-07-13 | End: 2020-07-14 | Stop reason: HOSPADM

## 2020-07-13 RX ORDER — OXYCODONE HCL 5 MG/5 ML
10 SOLUTION, ORAL ORAL
Status: DISCONTINUED | OUTPATIENT
Start: 2020-07-13 | End: 2020-07-13 | Stop reason: HOSPADM

## 2020-07-13 RX ORDER — HYDRALAZINE HYDROCHLORIDE 20 MG/ML
10 INJECTION INTRAMUSCULAR; INTRAVENOUS EVERY 4 HOURS PRN
Status: DISCONTINUED | OUTPATIENT
Start: 2020-07-13 | End: 2020-07-14 | Stop reason: HOSPADM

## 2020-07-13 RX ORDER — CEFAZOLIN SODIUM 1 G/3ML
INJECTION, POWDER, FOR SOLUTION INTRAMUSCULAR; INTRAVENOUS PRN
Status: DISCONTINUED | OUTPATIENT
Start: 2020-07-13 | End: 2020-07-13 | Stop reason: SURG

## 2020-07-13 RX ORDER — LEVOTHYROXINE SODIUM 0.03 MG/1
25 TABLET ORAL
Status: DISCONTINUED | OUTPATIENT
Start: 2020-07-14 | End: 2020-07-14 | Stop reason: HOSPADM

## 2020-07-13 RX ORDER — HALOPERIDOL 5 MG/ML
1 INJECTION INTRAMUSCULAR
Status: DISCONTINUED | OUTPATIENT
Start: 2020-07-13 | End: 2020-07-13 | Stop reason: HOSPADM

## 2020-07-13 RX ORDER — METOPROLOL TARTRATE 1 MG/ML
1 INJECTION, SOLUTION INTRAVENOUS
Status: DISCONTINUED | OUTPATIENT
Start: 2020-07-13 | End: 2020-07-13 | Stop reason: HOSPADM

## 2020-07-13 RX ORDER — DIPHENHYDRAMINE HYDROCHLORIDE 50 MG/ML
25 INJECTION INTRAMUSCULAR; INTRAVENOUS EVERY 6 HOURS PRN
Status: DISCONTINUED | OUTPATIENT
Start: 2020-07-13 | End: 2020-07-14 | Stop reason: HOSPADM

## 2020-07-13 RX ORDER — SODIUM CHLORIDE 9 MG/ML
500 INJECTION, SOLUTION INTRAVENOUS ONCE
Status: COMPLETED | OUTPATIENT
Start: 2020-07-13 | End: 2020-07-13

## 2020-07-13 RX ORDER — HYDRALAZINE HYDROCHLORIDE 20 MG/ML
5 INJECTION INTRAMUSCULAR; INTRAVENOUS
Status: DISCONTINUED | OUTPATIENT
Start: 2020-07-13 | End: 2020-07-13 | Stop reason: HOSPADM

## 2020-07-13 RX ORDER — OXYCODONE HYDROCHLORIDE 5 MG/1
5 TABLET ORAL
Status: DISCONTINUED | OUTPATIENT
Start: 2020-07-13 | End: 2020-07-14 | Stop reason: HOSPADM

## 2020-07-13 RX ORDER — ROCURONIUM BROMIDE 10 MG/ML
INJECTION, SOLUTION INTRAVENOUS PRN
Status: DISCONTINUED | OUTPATIENT
Start: 2020-07-13 | End: 2020-07-13 | Stop reason: SURG

## 2020-07-13 RX ORDER — PROTAMINE SULFATE 10 MG/ML
INJECTION, SOLUTION INTRAVENOUS PRN
Status: DISCONTINUED | OUTPATIENT
Start: 2020-07-13 | End: 2020-07-13 | Stop reason: SURG

## 2020-07-13 RX ORDER — PHENYLEPHRINE HCL IN 0.9% NACL 0.5 MG/5ML
SYRINGE (ML) INTRAVENOUS PRN
Status: DISCONTINUED | OUTPATIENT
Start: 2020-07-13 | End: 2020-07-13 | Stop reason: SURG

## 2020-07-13 RX ORDER — LABETALOL HYDROCHLORIDE 5 MG/ML
10 INJECTION, SOLUTION INTRAVENOUS EVERY 4 HOURS PRN
Status: DISCONTINUED | OUTPATIENT
Start: 2020-07-13 | End: 2020-07-14 | Stop reason: HOSPADM

## 2020-07-13 RX ORDER — MIDAZOLAM HYDROCHLORIDE 1 MG/ML
1 INJECTION INTRAMUSCULAR; INTRAVENOUS
Status: DISCONTINUED | OUTPATIENT
Start: 2020-07-13 | End: 2020-07-13 | Stop reason: HOSPADM

## 2020-07-13 RX ORDER — ASPIRIN 81 MG/1
81 TABLET, CHEWABLE ORAL DAILY
Status: DISCONTINUED | OUTPATIENT
Start: 2020-07-14 | End: 2020-07-14 | Stop reason: HOSPADM

## 2020-07-13 RX ORDER — LEVOTHYROXINE SODIUM 0.03 MG/1
25 TABLET ORAL
Status: CANCELLED | OUTPATIENT
Start: 2020-07-14

## 2020-07-13 RX ORDER — HALOPERIDOL 5 MG/ML
1 INJECTION INTRAMUSCULAR EVERY 6 HOURS PRN
Status: DISCONTINUED | OUTPATIENT
Start: 2020-07-13 | End: 2020-07-14 | Stop reason: HOSPADM

## 2020-07-13 RX ORDER — DEXTROSE MONOHYDRATE, SODIUM CHLORIDE, AND POTASSIUM CHLORIDE 50; 1.49; 4.5 G/1000ML; G/1000ML; G/1000ML
INJECTION, SOLUTION INTRAVENOUS EVERY 6 HOURS
Status: DISPENSED | OUTPATIENT
Start: 2020-07-13 | End: 2020-07-13

## 2020-07-13 RX ORDER — ATORVASTATIN CALCIUM 80 MG/1
80 TABLET, FILM COATED ORAL EVERY EVENING
Status: DISCONTINUED | OUTPATIENT
Start: 2020-07-13 | End: 2020-07-14 | Stop reason: HOSPADM

## 2020-07-13 RX ORDER — BUPIVACAINE HYDROCHLORIDE AND EPINEPHRINE 5; 5 MG/ML; UG/ML
INJECTION, SOLUTION EPIDURAL; INTRACAUDAL; PERINEURAL
Status: DISCONTINUED | OUTPATIENT
Start: 2020-07-13 | End: 2020-07-13 | Stop reason: HOSPADM

## 2020-07-13 RX ORDER — HEPARIN SODIUM,PORCINE 1000/ML
VIAL (ML) INJECTION PRN
Status: DISCONTINUED | OUTPATIENT
Start: 2020-07-13 | End: 2020-07-13 | Stop reason: SURG

## 2020-07-13 RX ORDER — OXYCODONE HCL 5 MG/5 ML
5 SOLUTION, ORAL ORAL
Status: DISCONTINUED | OUTPATIENT
Start: 2020-07-13 | End: 2020-07-13 | Stop reason: HOSPADM

## 2020-07-13 RX ORDER — SCOLOPAMINE TRANSDERMAL SYSTEM 1 MG/1
1 PATCH, EXTENDED RELEASE TRANSDERMAL
Status: DISCONTINUED | OUTPATIENT
Start: 2020-07-13 | End: 2020-07-14 | Stop reason: HOSPADM

## 2020-07-13 RX ORDER — SODIUM CHLORIDE, SODIUM LACTATE, POTASSIUM CHLORIDE, CALCIUM CHLORIDE 600; 310; 30; 20 MG/100ML; MG/100ML; MG/100ML; MG/100ML
INJECTION, SOLUTION INTRAVENOUS
Status: DISCONTINUED | OUTPATIENT
Start: 2020-07-13 | End: 2020-07-13 | Stop reason: SURG

## 2020-07-13 RX ORDER — HYDROMORPHONE HYDROCHLORIDE 1 MG/ML
0.4 INJECTION, SOLUTION INTRAMUSCULAR; INTRAVENOUS; SUBCUTANEOUS
Status: DISCONTINUED | OUTPATIENT
Start: 2020-07-13 | End: 2020-07-13 | Stop reason: HOSPADM

## 2020-07-13 RX ORDER — ONDANSETRON 2 MG/ML
4 INJECTION INTRAMUSCULAR; INTRAVENOUS EVERY 4 HOURS PRN
Status: DISCONTINUED | OUTPATIENT
Start: 2020-07-13 | End: 2020-07-14 | Stop reason: HOSPADM

## 2020-07-13 RX ORDER — ONDANSETRON 2 MG/ML
INJECTION INTRAMUSCULAR; INTRAVENOUS PRN
Status: DISCONTINUED | OUTPATIENT
Start: 2020-07-13 | End: 2020-07-13 | Stop reason: SURG

## 2020-07-13 RX ORDER — CLONIDINE HYDROCHLORIDE 0.1 MG/1
0.1 TABLET ORAL
Status: DISCONTINUED | OUTPATIENT
Start: 2020-07-13 | End: 2020-07-14 | Stop reason: HOSPADM

## 2020-07-13 RX ORDER — SODIUM CHLORIDE, SODIUM LACTATE, POTASSIUM CHLORIDE, CALCIUM CHLORIDE 600; 310; 30; 20 MG/100ML; MG/100ML; MG/100ML; MG/100ML
INJECTION, SOLUTION INTRAVENOUS CONTINUOUS
Status: DISCONTINUED | OUTPATIENT
Start: 2020-07-13 | End: 2020-07-13 | Stop reason: HOSPADM

## 2020-07-13 RX ORDER — LIDOCAINE HYDROCHLORIDE 20 MG/ML
INJECTION, SOLUTION EPIDURAL; INFILTRATION; INTRACAUDAL; PERINEURAL PRN
Status: DISCONTINUED | OUTPATIENT
Start: 2020-07-13 | End: 2020-07-13 | Stop reason: SURG

## 2020-07-13 RX ORDER — SUCCINYLCHOLINE/SOD CL,ISO/PF 200MG/10ML
SYRINGE (ML) INTRAVENOUS PRN
Status: DISCONTINUED | OUTPATIENT
Start: 2020-07-13 | End: 2020-07-13 | Stop reason: SURG

## 2020-07-13 RX ORDER — CLONIDINE HYDROCHLORIDE 0.1 MG/1
0.2 TABLET ORAL
Status: DISCONTINUED | OUTPATIENT
Start: 2020-07-13 | End: 2020-07-14 | Stop reason: HOSPADM

## 2020-07-13 RX ORDER — DEXAMETHASONE SODIUM PHOSPHATE 4 MG/ML
INJECTION, SOLUTION INTRA-ARTICULAR; INTRALESIONAL; INTRAMUSCULAR; INTRAVENOUS; SOFT TISSUE PRN
Status: DISCONTINUED | OUTPATIENT
Start: 2020-07-13 | End: 2020-07-13 | Stop reason: SURG

## 2020-07-13 RX ORDER — ONDANSETRON 2 MG/ML
4 INJECTION INTRAMUSCULAR; INTRAVENOUS
Status: DISCONTINUED | OUTPATIENT
Start: 2020-07-13 | End: 2020-07-13 | Stop reason: HOSPADM

## 2020-07-13 RX ADMIN — FENTANYL CITRATE 50 MCG: 50 INJECTION, SOLUTION INTRAMUSCULAR; INTRAVENOUS at 10:28

## 2020-07-13 RX ADMIN — SODIUM CHLORIDE 500 ML: 9 INJECTION, SOLUTION INTRAVENOUS at 05:35

## 2020-07-13 RX ADMIN — FENTANYL CITRATE 100 MCG: 50 INJECTION, SOLUTION INTRAMUSCULAR; INTRAVENOUS at 10:25

## 2020-07-13 RX ADMIN — ATORVASTATIN CALCIUM 80 MG: 80 TABLET, FILM COATED ORAL at 17:54

## 2020-07-13 RX ADMIN — PHENYLEPHRINE HYDROCHLORIDE 40 MCG/MIN: 10 INJECTION INTRAVENOUS at 10:32

## 2020-07-13 RX ADMIN — CEFAZOLIN 2 G: 330 INJECTION, POWDER, FOR SOLUTION INTRAMUSCULAR; INTRAVENOUS at 09:52

## 2020-07-13 RX ADMIN — ASPIRIN 81 MG 81 MG: 81 TABLET ORAL at 02:11

## 2020-07-13 RX ADMIN — Medication 50 MCG: at 11:22

## 2020-07-13 RX ADMIN — PROTAMINE SULFATE 50 MG: 10 INJECTION, SOLUTION INTRAVENOUS at 11:11

## 2020-07-13 RX ADMIN — ONDANSETRON 4 MG: 2 INJECTION INTRAMUSCULAR; INTRAVENOUS at 11:13

## 2020-07-13 RX ADMIN — Medication 180 MG: at 09:52

## 2020-07-13 RX ADMIN — ROCURONIUM BROMIDE 50 MG: 10 INJECTION, SOLUTION INTRAVENOUS at 09:52

## 2020-07-13 RX ADMIN — SUGAMMADEX 200 MG: 100 INJECTION, SOLUTION INTRAVENOUS at 11:24

## 2020-07-13 RX ADMIN — LIDOCAINE HYDROCHLORIDE 80 MG: 20 INJECTION, SOLUTION EPIDURAL; INFILTRATION; INTRACAUDAL at 09:52

## 2020-07-13 RX ADMIN — HEPARIN SODIUM 7000 UNITS: 1000 INJECTION, SOLUTION INTRAVENOUS; SUBCUTANEOUS at 10:36

## 2020-07-13 RX ADMIN — ATORVASTATIN CALCIUM 80 MG: 80 TABLET, FILM COATED ORAL at 22:10

## 2020-07-13 RX ADMIN — SODIUM CHLORIDE, POTASSIUM CHLORIDE, SODIUM LACTATE AND CALCIUM CHLORIDE: 600; 310; 30; 20 INJECTION, SOLUTION INTRAVENOUS at 09:47

## 2020-07-13 RX ADMIN — FENTANYL CITRATE 100 MCG: 50 INJECTION, SOLUTION INTRAMUSCULAR; INTRAVENOUS at 09:52

## 2020-07-13 RX ADMIN — EPHEDRINE SULFATE 10 MG: 50 INJECTION, SOLUTION INTRAVENOUS at 10:08

## 2020-07-13 RX ADMIN — Medication 100 MCG: at 09:56

## 2020-07-13 RX ADMIN — DEXAMETHASONE SODIUM PHOSPHATE 10 MG: 4 INJECTION, SOLUTION INTRA-ARTICULAR; INTRALESIONAL; INTRAMUSCULAR; INTRAVENOUS; SOFT TISSUE at 09:52

## 2020-07-13 RX ADMIN — PROPOFOL 160 MG: 10 INJECTION, EMULSION INTRAVENOUS at 09:52

## 2020-07-13 RX ADMIN — Medication 100 MCG: at 10:20

## 2020-07-13 RX ADMIN — EPHEDRINE SULFATE 5 MG: 50 INJECTION, SOLUTION INTRAVENOUS at 10:20

## 2020-07-13 ASSESSMENT — ENCOUNTER SYMPTOMS
PALPITATIONS: 0
HEADACHES: 1
SORE THROAT: 0
SHORTNESS OF BREATH: 0
DEPRESSION: 0
FOCAL WEAKNESS: 0
FALLS: 0
HEADACHES: 0
FOCAL WEAKNESS: 1
WEAKNESS: 1
NAUSEA: 0
CONSTIPATION: 0
DIZZINESS: 0
LOSS OF CONSCIOUSNESS: 1
VOMITING: 0
ABDOMINAL PAIN: 0
DOUBLE VISION: 0
FEVER: 0
SEIZURES: 0
COUGH: 0
CHILLS: 0
SPEECH CHANGE: 0
DIZZINESS: 1
TINGLING: 0
DIARRHEA: 0
BLURRED VISION: 0
NECK PAIN: 0
SENSORY CHANGE: 0

## 2020-07-13 ASSESSMENT — COGNITIVE AND FUNCTIONAL STATUS - GENERAL
SUGGESTED CMS G CODE MODIFIER MOBILITY: CI
CLIMB 3 TO 5 STEPS WITH RAILING: A LITTLE
MOBILITY SCORE: 23
SUGGESTED CMS G CODE MODIFIER DAILY ACTIVITY: CH
DAILY ACTIVITIY SCORE: 24

## 2020-07-13 ASSESSMENT — FIBROSIS 4 INDEX: FIB4 SCORE: 2.012461179749810727

## 2020-07-13 ASSESSMENT — GAIT ASSESSMENTS
GAIT LEVEL OF ASSIST: SUPERVISED
DISTANCE (FEET): 300
DEVIATION: NO DEVIATION

## 2020-07-13 ASSESSMENT — ACTIVITIES OF DAILY LIVING (ADL): TOILETING: INDEPENDENT

## 2020-07-13 ASSESSMENT — PAIN SCALES - GENERAL: PAIN_LEVEL: 0

## 2020-07-13 NOTE — ANESTHESIA QCDR
2019 [facility] Clinical Data Registry (for Quality Improvement)     Postoperative nausea/vomiting risk protocol (Adult = 18 yrs and Pediatric 3-17 yrs)- (430 and 463)  General inhalation anesthetic (NOT TIVA) with PONV risk factors: No  Provision of anti-emetic therapy with at least 2 different classes of agents: N/A  Patient DID NOT receive anti-emetic therapy and reason is documented in Medical Record: N/A    Multimodal Pain Management- (477)  Non-emergent surgery AND patient age >= 18: No  Use of Multimodal Pain Management, two or more drugs and/or interventions, NOT including systemic opioids:   Exception: Documented allergy to multiple classes of analgesics:     Smoking Abstinence (404)  Patient is current smoker (cigarette, pipe, e-cig, marijuanna): No  Elective Surgery:   Abstinence instructions provided prior to day of surgery:   Patient abstained from smoking on day of surgery:     Pre-Op Beta-Blocker in Isolated CABG (44)  Isolated CABG AND patient age >= 18: No  Beta-blocker admin within 24 hours of surgical incision:   Exception:of medical reason(s) for not administering beta blocker within 24 hours prior to surgical incision (e.g., not  indicated,other medical reason):     PACU assessment of acute postoperative pain prior to Anesthesia Care End- Applies to Patients Age = 18- (ABG7)  Initial PACU pain score is which of the following: < 7/10  Patient unable to report pain score: N/A    Post-anesthetic transfer of care checklist/protocol to PACU/ICU- (426 and 427)  Upon conclusion of case, patient transferred to which of the following locations: PACU/Non-ICU  Use of transfer checklist/protocol: Yes  Exclusion: Service Performed in Patient Hospital Room (and thus did not require transfer): N/A  Unplanned admission to ICU related to anesthesia service up through end of PACU care- (MD51)  Unplanned admission to ICU (not initially anticipated at anesthesia start time): No

## 2020-07-13 NOTE — ED NOTES
Pt rounded on, resting in bed, respirations even and unlabored, repositioning self as needed, wife at BS.

## 2020-07-13 NOTE — THERAPY
Occupational Therapy   Initial Evaluation     Patient Name: Marco Echeverria  Age:  75 y.o., Sex:  male  Medical Record #: [id number]  Today's Date: 7/13/2020          Assessment  Patient is 75 y.o. male with a diagnosis of TIA.  Additional factors influencing patient status / progress: symptoms resolved, feels like he is at baseline, independent prior.      Plan    Recommend Occupational Therapy for Evaluation only for the following treatments:  NA.    Discharge recommendations:  Anticipate that the patient will have no further occupational therapy needs after discharge from the hospital.     Subjective    Cooperative, motivated for return to home. Appears to have regained prior level of function     Objective       07/13/20 0735   Total Time Spent   Total Time Spent (Mins) 40   Charge Group   OT Evaluation OT Evaluation Mod   Initial Contact Note    Initial Contact Note Order Received and Verified, Evaluation Only - Patient Does Not Require Further Acute Occupational Therapy at this Time.  However, May Benefit from Post Acute Therapy for Higher Level Functional Deficits.   Prior Living Situation   Prior Services Home-Independent   Housing / Facility 1 Story House   Steps Into Home 1   Steps In Home 0   Bathroom Set up Walk In Shower;Bathtub / Shower Combination   Equipment Owned None   Lives with - Patient's Self Care Capacity Spouse   Comments independent prior, feels he has returnd t baseline level   Prior Level of ADL Function   Self Feeding Independent   Grooming / Hygiene Independent   Bathing Independent   Dressing Independent   Toileting Independent   Prior Level of IADL Function   Medication Management Independent   Laundry Independent   Kitchen Mobility Independent   Finances Independent   Home Management Independent   Shopping Independent   Prior Level Of Mobility Independent Without Device in Community   Driving / Transportation Driving Independent   Vitals   O2 (LPM) 2   O2 Delivery Device Nasal Cannula    Pain 0 - 10 Group   Therapist Pain Assessment Post Activity Pain Same as Prior to Activity;Nurse Notified;0   Cognition    Level of Consciousness Alert   Passive ROM Upper Body   Passive ROM Upper Body WDL   Active ROM Upper Body   Active ROM Upper Body  WDL   Dominant Hand Right   Strength Upper Body   Upper Body Strength  WDL   Sensation Upper Body   Upper Extremity Sensation  WDL   Upper Body Muscle Tone   Upper Body Muscle Tone  WDL   Coordination Upper Body   Coordination WDL   Balance Assessment   Sitting Balance (Static) Good   Sitting Balance (Dynamic) Good   Standing Balance (Static) Fair +   Standing Balance (Dynamic) Fair +   Weight Shift Sitting Good   Weight Shift Standing Good   Bed Mobility    Supine to Sit Supervised   Sit to Supine Supervised   Scooting Supervised   Rolling Supervised   ADL Assessment   Eating Supervision   Grooming Supervision;Seated;Standing   Bathing Supervision   Upper Body Dressing Supervision   Lower Body Dressing Supervision   Toileting Supervision   How much help from another person does the patient currently need...   Putting on and taking off regular lower body clothing? 4   Bathing (including washing, rinsing, and drying)? 4   Toileting, which includes using a toilet, bedpan, or urinal? 4   Putting on and taking off regular upper body clothing? 4   Taking care of personal grooming such as brushing teeth? 4   Eating meals? 4   6 Clicks Daily Activity Score 24   Functional Mobility   Sit to Stand Supervised   Bed, Chair, Wheelchair Transfer Supervised   Toilet Transfers Supervised   Transfer Method Stand Pivot   Visual Perception   Visual Perception  WDL   Activity Tolerance   Sitting in Chair ad robina   Sitting Edge of Bed ad robina   Standing ad robina   Education Group   Role of Occupational Therapist Patient Response Patient;Acceptance;Explanation;Demonstration;Verbal Demonstration;Action Demonstration   Anticipated Discharge Equipment   DC Equipment None   Interdisciplinary  Plan of Care Collaboration   IDT Collaboration with  Nursing;Physical Therapist   Patient Position at End of Therapy In Bed;Call Light within Reach;Tray Table within Reach;Phone within Reach   Collaboration Comments RN aware of functional level, needs for home   Session Information   Date / Session Number  7/13,1/1

## 2020-07-13 NOTE — CONSULTS
DATE OF CONSULTATION: 7/13/2020     REFERRING PHYSICIAN: MD PATY.     CONSULTING PHYSICIAN: Aly Tarango M.D.      REASON FOR CONSULTATION: Evaluate patient with suspected TIA and carotid artery stenosis.       HISTORY OF PRESENT ILLNESS: The patient is a pleasant 75-year-old gentleman who was in his usual state of health until yesterday when he reports that he was in his kitchen and had a near syncopal event.  Patient reports that he felt like he was going to blackout made to the living room and sat on the couch.  Reports that his wife came in the room and he was slumped over and drooling.  Reports that he had very weak legs bilaterally.  Patient denies any focal neurologic deficits i.e. numbness or weakness of his left or right side.  Patient denied any speech difficulties and had no unilateral visual disturbances.  The patient reports that he has had similar episodes in the past where he would become dizzy and suffer near syncopal events while working in the yard.  Upon arrival the patient was neurologically intact.  A work-up was initiated which included a CTA of his head and neck.  The CTA of his neck demonstrated a string sign stenosis on the right internal carotid artery.  The left side had dense calcific plaque with a moderate stenosis.    PAST MEDICAL HISTORY:  has a past medical history of CATARACT, History of malignant neoplasm of tonsil (2011), History of tongue cancer (2011), Late effect of radiation, and Radiation caries.     PAST SURGICAL HISTORY:  has a past surgical history that includes other (1990s); cataract extraction with iol; septoplasty (7/15/2010); antrostomy (7/15/2010); ethmoidectomy (7/15/2010); tonsillectomy; laryngoscopy (8/23/2011); tumor excision with biopsy (8/23/2011); and vasectomy.     ALLERGIES: No Known Allergies     CURRENT MEDICATIONS:   Home Medications     Reviewed by Nacho Burns (Pharmacy Tech) on 07/12/20 at 4599  Med List Status: Complete   Medication Last Dose  Status   losartan (COZAAR) 50 MG Tab  Active                FAMILY HISTORY:   Family History   Problem Relation Age of Onset   • Hypertension Mother         SOCIAL HISTORY:   Social History     Tobacco Use   • Smoking status: Former Smoker   • Smokeless tobacco: Former User     Quit date: 5/1/1975   • Tobacco comment: 1-2 pk a day for 15 yrs   Substance and Sexual Activity   • Alcohol use: Yes     Comment: 1 a month   • Drug use: No   • Sexual activity: Not on file       Review of Systems:      Constitutional: Reports episode of global weakness and near syncope    Eyes: Denies changes in vision, no eye pain  Ears/Nose/Throat/Mouth: Denies nasal congestion or sore throat   Cardiovascular: Denies chest pain or palpitations.  Respiratory: Denies shortness of breath, cough, and wheezing.  Gastrointestinal/Hepatic: Denies abdominal pain, nausea, vomiting, diarrhea, constipation or GI bleeding   Genitourinary: Denies dysuria or frequency  Musculoskeletal/Rheum: Denies  joint pain and swelling, no edema  Skin: Denies rash  Neurological: See history of present illness   psychiatric: denies mood disorder   Endocrine: Isabella thyroid problems  Heme/Oncology/Lymph Nodes: Denies enlarged lymph nodes, denies brusing or known bleeding disorder  All other systems were reviewed and are negative (AMA/CMS criteria)                  PHYSICAL EXAMINATION:       Constitutional:   Well developed, Well nourished, No acute distress  HENMT:  Normocephalic, Atraumatic, Oropharynx moist mucous membranes, No oral exudates, Nose normal.  No thyromegaly.  Eyes:  EOMI, Conjunctiva normal, No discharge.  Neck:  Normal range of motion, No cervical tenderness,  no JVD.  Cardiovascular:  Normal heart rate, Normal rhythm, No murmurs, No rubs, No gallops.   Extremitites with intact distal pulses, no cyanosis, or edema.  Lungs:  Normal breath sounds, breath sounds clear to auscultation bilaterally,  no crackles, no wheezing.   Abdomen: Bowel sounds  normal, Soft, No tenderness, No guarding, No rebound, No masses, No hepatosplenomegaly.  Skin: Warm, Dry, No erythema, No rash, no induration.  Neurologic: Alert & oriented x 3, No focal deficits noted, cranial nerves II through X are grossly intact.  Psychiatric: Affect normal, Judgment normal, Mood normal.        LABORATORY VALUES:   Recent Labs     07/12/20 1940   WBC 6.7   RBC 5.14   HEMOGLOBIN 16.0   HEMATOCRIT 46.5   MCV 90.5   MCH 31.1   MCHC 34.4   RDW 40.9   PLATELETCT 188   MPV 9.0     Recent Labs     07/12/20 1940 07/13/20  0629   SODIUM 126* 125*   POTASSIUM 4.2  --    CHLORIDE 88*  --    CO2 20  --    GLUCOSE 151*  --    BUN 8  --    CREATININE 0.83  --    CALCIUM 9.2  --      Recent Labs     07/12/20 1940 07/12/20 2003   ASTSGOT 23  --    ALTSGPT 21  --    TBILIRUBIN 0.5  --    ALKPHOSPHAT 107*  --    GLOBULIN 2.5  --    INR 0.91  --    AMMONIA  --  <10*     Recent Labs     07/12/20 1940   APTT 25.2   INR 0.91        IMAGING:   DX-CHEST-PORTABLE (1 VIEW)   Final Result      No acute cardiopulmonary abnormality.      CT-HEAD WITH   Final Result      Head CT with contrast within normal limits. No evidence of acute cerebral infarction, hemorrhage, mass lesion, or abnormal enhancement.      CT-CTA NECK WITH & W/O-POST PROCESSING   Final Result      1.  Critical right ICA stenosis as described above.   2.  Approximate 30-40% left ICA stenosis.   3.  Normal appearance of the cervical vertebral arteries.      CT-CTA HEAD WITH & W/O-POST PROCESS   Final Result      1.  Focal stenosis of the left internal carotid artery just below the skull base with approximately 50% resultant stenosis.   2.  Otherwise normal CT angiography of the head.      CT-CEREBRAL PERFUSION ANALYSIS   Final Result      1.  Cerebral blood flow less than 30% likely representing completed infarct = 0 mL.      2.  T Max more than 6 seconds likely representing combination of completed infarct and ischemia = 0 mL.      3.  Mismatched  volume likely representing ischemic brain/penumbra = None      4.  Please note that the cerebral perfusion was performed on the limited brain tissue around the basal ganglia region. Infarct/ischemia outside the CT perfusion sections can be missed in this study.      CT-HEAD W/O   Final Result      Normal CT scan of the head without contrast.               INTERPRETING LOCATION:  [facility], [place], [address]          IMPRESSION AND PLAN:     Near syncopal event  High-grade right carotid artery stenosis with string sign present  Moderate contralateral disease    Unknown if the patient's syncopal event was related to his carotid artery stenosis but based on the fact that this is a string sign and carries with it a high risk of future stroke I have recommended endarterectomy during this admission.  The patient has been placed on aspirin and Plavix.  I discussed the risk benefits alternatives and expectations with surgery.  We discussed the possibility of stroke at the time of surgery.  The patient demonstrates a clear understanding of our discussion and agrees to proceed.    Plan-     Right carotid endarterectomy today    ____________________________________   Aly Tarango M.D.          DD: 7/13/2020   DT: 8:27 AM

## 2020-07-13 NOTE — THERAPY
Physical Therapy   Initial Evaluation     Patient Name: Marco Echeverria  Age:  75 y.o., Sex:  male  Medical Record #: [id number]  Today's Date: 7/13/2020          Assessment  Patient is 75 y.o. male with a diagnosis of TIA. Symptoms have resolved since admission. CTA of his neck demonstrated critical right internal carotid artery stenosis with surgical intervention planned for today. Pt agreeable to work with therapy and presented with no strength, coordination, or sensation deficits. No further acute PT needs, pt appears to be at baseline.       Plan    Recommend Physical Therapy for Evaluation only for the following treatments:  none    Discharge recommendations:  Anticipate that the patient will have no further physical therapy needs after discharge from the hospital.         07/13/20 0814   Prior Living Situation   Prior Services Home-Independent   Housing / Facility 1 Story House   Steps Into Home 1   Steps In Home 0   Equipment Owned None   Lives with - Patient's Self Care Capacity Spouse   Comments independent and living with his wife   Prior Level of Functional Mobility   Bed Mobility Independent   Transfer Status Independent   Ambulation Independent   Distance Ambulation (Feet)   (community)   Assistive Devices Used None   Stairs Independent   Gait Analysis   Gait Level Of Assist Supervised   Assistive Device None   Distance (Feet) 300   # of Times Distance was Traveled 1   Deviation No deviation   # of Stairs Climbed 3   Level of Assist with Stairs Supervised   Weight Bearing Status FWB   Comments no LOB   Bed Mobility    Supine to Sit Supervised   Sit to Supine Supervised   Scooting Supervised   Functional Mobility   Sit to Stand Supervised   Bed, Chair, Wheelchair Transfer Supervised   Mobility in room   Anticipated Discharge Equipment   DC Equipment None

## 2020-07-13 NOTE — OP REPORT
07/13/20    OPERATIVE NOTE    PRE-OPERATIVE DIAGNOSIS -     1. Symptomatic Right Carotid Artery Stenosis    POST-OPERATIVE DIAGNOSIS - Same     PROCEDURE PERFORMED - Right carotid endarterectomy    SURGEON - Aly Tarango M.D.    ASSISTANT - MARKY Reich    TYPE OF ANESTHESIA - General     ANESTHESIOLOGIST  -Dr. Guzman      SPECIMENS -none      PROCEDURE IN DETAIL -     Patient was taken to the operating suite placed in the supine position.  After adequate anesthesia the patient's right neck was prepped and draped in sterile fashion.  Half percent Marcaine with epinephrine was infiltrated into the subcutaneous tissue along the anterior border of the sternocleidomastoid muscle.  An incision was made along the anterior border the incision was carried down through the subcutaneous tissue and through the platysma layer.  Sternocleidomastoid muscle was retracted laterally and the dissection took place along the anterior aspect of the jugular vein.  Several tributaries of the jugular vein were ligated including the facial vein.  Common carotid artery was identified the common carotid artery was dissected circumferentially and a Bindu tourniquet was placed around that vessel.  Dissection took place distally and the origin of the external carotid artery was looped with a small vessel loop and the internal carotid artery was dissected distally.  Great care was taken to preserve all cranial nerve structures they were identified and spared.  The internal carotid artery was then encircled with a vessel loop.  7000 units of heparin was administered and after an appropriate period of time the vessels were sequentially clamped starting with the internal carotid.  There was no EEG change at the time of carotid crossclamping therefore shunting was not employed.  Longitudinal arteriotomy was made in the common carotid artery and carried up onto the internal carotid artery using Zapien scissors.  The patient was noted to have  a hemorrhagic plaque in the proximal internal carotid artery.  An endarterectomy was performed of the distal common carotid proximal external carotid and internal carotid arteries.  An eversion technique was used on the external carotid.  The distal endpoint was tacked using several 6-0 Prolene sutures.  The area was irrigated with copious amounts normal saline and all atheromatous debris was removed.  Next a bovine patch was sewn in circumferentially using 5-0 Prolene sutures in a running fashion.  Just prior to completing the patch all vessels were flushed and irrigated and the patch was completed.  Flow was established first to the external carotid and subsequently to the internal carotid artery.  Hemostasis was assured.  Protamine was administered.  A 10 flat fluted Bernabe-Duran drain was left within the neck and brought out through a separate stab incision and secured to the skin using a 3-0 nylon suture.  2-0 Vicryl interrupted sutures were used for the platysma layer and a 4-0 strata fix was used to reapproximate the skin incision.  Sterile dressings were applied.       Aly Tarango M.D.

## 2020-07-13 NOTE — ED NOTES
Med rec updated and  Complete. Allergies reviewed.   No antibiotic use in last 14 days.      Local home [facility].

## 2020-07-13 NOTE — DISCHARGE PLANNING
[facility] Coordination-follow up.   Patient is 75 y.o. male with a diagnosis of TIA. Symptoms have resolved since admission. CTA of his neck demonstrated critical right internal carotid artery stenosis with surgical intervention completed today.     PT/OT both evaluated patient today. Pt is sup w/ mobility and OT does not recommend continued therapies after discharge.   PT does not met acute care criteria.     Therefore, this referral will not be forwarded to physiatry as it appears pt will be able to return home w/ spouse.     Thank you for referral.

## 2020-07-13 NOTE — PROGRESS NOTES
2 RN Skin Check    2 RN skin check complete with Lizabeth YOUNGER.   Devices in place: Nasal Cannula.  Skin assessed under devices: yes.  Confirmed pressure ulcers found on: n/a.  New potential pressure ulcers noted on n/a. Wound consult placed N/A.  The following interventions in place Pillows.    Bilat ears pink and blanching  Right elbow intact and blanching  Left elbow slightly red but blanching  Sacrum intact and blanching  Bilat feet/ heels dry, flaky, intact and blanching  Right dorsal foot has an abrasion

## 2020-07-13 NOTE — ED NOTES
Stroke Pre Alert Time: 1928  Vitals PTA: 200/90, 93% ra, 18rr, 108hr  FSBS PTA: 158  Deficits PTA: L sided facial droop, L sided weakness  Door Time: 1936  On set or Last Seen Normal: 1900  A&O on Arrival: aaox4  GCS: 15  FSBS on Arrival: 158  On arrival deficits: Symptoms resolved, no deficits noted.

## 2020-07-13 NOTE — PROGRESS NOTES
Report received from ER RN. Updated on POC.  Assumed care of patient upon arrival to unit. Patient currently A & O x 4; on 3 L O2 NC; up SBA; without complaints of acute pain. Pt placed on monitor, monitor room notified, . Patient oriented to unit and to call light system. Call light within reach. Pt educated to fall risk. Fall precautions in place. Pt provided with personal grooming items. Bed locked and in lowest position. All questions answered. No other needs indicated at this time.

## 2020-07-13 NOTE — CONSULTS
"Neurology STROKE CODE H&P  Neurohospitalist Service, [facility]    Referring Physician: VICTORIANO Granger.*    STROKE CODE: transient left facial droop.    HPI: Marco Echeverria is a 75 y.o. man presenting for whom neurology has been consulted for transient left sided weakness affecting face and arm lasting 5 minutes.  Patient was in his usual state of health, at home with wife, last seen normal at 7 PM 7/12/2020.  Previously in the day, the patient had complained of a mild frontal, pressure-like headache.  At 7:03pm, wife found patient to \"be slumped over\", had left-sided facial droop and left arm weakness.  The patient describes the event as sensing lightheadedness; denies loss of consciousness.  There was no witnessed jerking or abnormal movements.  No tongue biting or loss of continence.  Upon arrival to [facility], the patient was at his baseline, not confused nor disoriented.  No longer exhibiting weakness.  Currently denies change in vision, speech, denies weakness, numbness or paresthesias.  No lightheadedness nor vertigo currently.  Said that he had one episode similar to this in the past in the setting of antihypertensive medication change.  Patient compliant on home medication regimen.    Review of systems: In addition to what is detailed in the HPI above, all other systems reviewed and are negative.    Past Medical History:    has a past medical history of Cataract, History of malignant neoplasm of tonsil (2011), History of tongue cancer (2011), Late effect of radiation, and Radiation Caries.    FHx:  family history includes Hypertension in his mother.    SHx:   reports that he has quit smoking. He quit smokeless tobacco use about 45 years ago. He reports current alcohol use. He reports that he does not use drugs.    Allergies:  No Known Allergies    Medications:  No current facility-administered medications for this encounter.     Current Outpatient " "Medications:   •  acetaminophen (TYLENOL) 325 MG TABS, Take 650 mg by mouth every four hours as needed for Mild Pain., Disp: , Rfl:     Physical Examination:    Vitals:    07/12/20 2004   BP: (!) 204/108   Pulse: (!) 112   Resp: 18   Temp: 36.7 °C (98 °F)   TempSrc: Temporal   SpO2: 91%   Weight: 81.6 kg (180 lb)   Height: 1.88 m (6' 2\")     General: Patient is awake and in no acute distress  Eyes: examination of optic disks not indicated at this time given acuity of consult  CV: RRR    NEUROLOGICAL EXAM:     Mental status: Awake, alert and fully oriented, follows commands  Speech and language: speech is mildly dysarthric. The patient is able to name and repeat.  Cranial nerve exam: Pupils are equal, round and reactive to light bilaterally. Visual fields are full. Extraocular muscles are intact. Sensation in the face is intact to light touch. Face is notable for a (chronic) right facial droop in LMN distribution.  No left sided facial droop. Hearing to finger rub equal. Palate elevates symmetrically. Shoulder shrug is full. Tongue is midline.  Motor exam: Strength is 5/5 in all extremities both distally and proximally. Tone is normal. No abnormal movements were seen on exam.  Sensory exam: No sensory deficits identified   Deep tendon reflexes: 2+ and symmetric. Toes down-going bilaterally.  Coordination: no ataxia   Gait: deferred given patient preference    NIH Stroke Scale:    1a. Level of Consciousness (Alert, drowsy, etc): 0= Alert    1b. LOC Questions (Month, age): 0= Answers both correctly    1c. LOC Commands (Open/close eyes make fist/let go): 0= Obeys both correctly    2.   Best Gaze (Eyes open - patient follows examiner's finger on face): 0= Normal    3.   Visual Fields (introduce visual stimulus/threat to patient's field quadrants): 0= No visual loss  4.   Facial Paresis (Show teeth, raise eyebrows and squeeze eyes shut): 1= Minor     5a. Motor Arm - Left (Elevate arm to 90 degrees if patient is sitting, " 45 degrees if  supine): 0= No drift    5b. Motor Arm - Right (Elevate arm to 90 degrees if patient is sitting, 45 degrees if supine): 0= No drift    6a. Motor Leg - Left (Elevate leg 30 degrees with patient supine): 0= No drift    6b. Motor Leg - Right  (Elevate leg 30 degrees with patient supine): 0= No drift    7.   Limb Ataxia (Finger-nose, heel down shin): 0= No ataxia    8.   Sensory (Pin prick to face, arm, trunk and leg - compare side to side): 0= Normal    9.  Best Language (Name item, describe a picture and read sentences): 0= No aphasia    10. Dysarthria (Evaluate speech clarity by patient repeating listed words): 1= Mild to moderate slurring    11. Extinction and Inattention (Use information from prior testing to identify neglect or  double simultaneous stimuli testing): 0= No neglect    Total NIH Score: 2    Objective Data:    Labs:  No results found for: PROTHROMBTM, INR   Lab Results   Component Value Date/Time    WBC 4.2 (L) 08/23/2011 12:47 PM    RBC 4.78 08/23/2011 12:47 PM    HEMOGLOBIN 15.1 08/23/2011 12:47 PM    HEMATOCRIT 43.0 08/23/2011 12:47 PM    MCV 89.9 08/23/2011 12:47 PM    MCH 31.6 08/23/2011 12:47 PM    MCHC 35.2 (H) 08/23/2011 12:47 PM    MPV 7.8 08/23/2011 12:47 PM    NEUTSPOLYS 63.0 07/12/2010 12:00 PM    LYMPHOCYTES 18.0 (L) 07/12/2010 12:00 PM    MONOCYTES 10.0 (H) 07/12/2010 12:00 PM    EOSINOPHILS 0.0 07/12/2010 12:00 PM    BASOPHILS 0.0 07/12/2010 12:00 PM    HYPOCHROMIA 1+ 07/12/2010 12:00 PM    ANISOCYTOSIS 1+ 07/12/2010 12:00 PM      Lab Results   Component Value Date/Time    SODIUM 131 (L) 07/12/2010 12:00 PM    POTASSIUM 4.0 07/12/2010 12:00 PM    CHLORIDE 99 07/12/2010 12:00 PM    CO2 28 07/12/2010 12:00 PM    GLUCOSE 88 07/12/2010 12:00 PM    BUN 9 07/12/2010 12:00 PM    CREATININE 0.79 07/12/2010 12:00 PM      No results found for: CHOLSTRLTOT, LDL, HDL, TRIGLYCERIDE    No results found for: ALKPHOSPHAT, ASTSGOT, ALTSGPT, TBILIRUBIN     Imaging/Testing:    I interpreted  and/or reviewed the patient's neuroimaging    CT-HEAD W/O   Final Result      Normal CT scan of the head without contrast.               INTERPRETING LOCATION:  [address], [place], [address]      DX-CHEST-PORTABLE (1 VIEW)    (Results Pending)   CT-CEREBRAL PERFUSION ANALYSIS    (Results Pending)   CT-CTA HEAD WITH & W/O-POST PROCESS    (Results Pending)   CT-CTA NECK WITH & W/O-POST PROCESSING    (Results Pending)   CT-HEAD WITH    (Results Pending)       Assessment and Plan:    Marco Echeverria is a 75 y.o. man with a history of tongue cancer s/p radiation and chronic right LMN facial droop presenting for whom neurology has been consulted for transient left-sided weakness affecting face and arm.  Differential includes TIA vs. seizure vs. Syncope vs. Hypertensive urgency.  The patient is not a candidate for thrombolytic therapy nor acute stroke intervention such as IR thrombectomy given patient is now back to baseline.    Plan:    -Contrasted CT imaging in an effort to identify any underlying nidus that could serve as seizure focus  -Vessel imaging head and neck as part of stroke protocol  -given TIA is on the ddx:   --> Neurology checks and vital signs per protocol   --> Permissive HTN for 24-48 hours from onset of symptoms followed by goal BP <140 systolic. Long term BP goal (s/p 1-2 weeks from onset) is normotension   --> obtain normoglycemia and avoid hypo- or hyper -natremia; aim for normothermia   --> evaluate and treat with PT/OT/ST  - toxic metabolic workup: TSH, B12, ammonia, UA, urine drug screen  -rEEG  -Hold off on initiating any antiseizure medication until further work-up is conducted as underlying semiology is not necessarily confirmed to be seizure  -once workup is complete, if etiology is felt to be TIA/vascular, consider DAPT for 21 days per [facility]    The evaluation of the patient, and recommended management, was discussed with Dr. Nielsen.     Dariel Owusu MD  Medical  Director, [facility], [facility]  Neurohospitalist, & Vascular Neurology, [facility]  Clinical  of Neurology, [facility] [employer]

## 2020-07-13 NOTE — NON-PROVIDER
Daily Progress Note:     Date of Service: 7/13/2020  Primary Team: [facility] Orange Team   Attending: Dr. Jake De La O  Senior Resident: Dr. Handy Sharma  Intern: Dr. Lilly Bach  Contact:  [phone]    ID:  72 yo M w/ hx of HTN (on Losartan), R parotid tumor, throat cancer (tonsil neoplasm, 2011, s/p chemo/radiation) who presents with left-sided facial droop, left-sided extremity weakness, and loss of consciousness.    24-Hour Events:  Overnight, no acute events.    Subjective:   This morning, patient reports feeling great. He still has a mild, dull frontal headache, but is not complaining of any known weakness. He denies dizziness, chest pain, SOB, and numbness/tingling.     Per patient, he only remembers feeling lightheaded and dizzy, then sat down, and next thing he remembers is being in an ambulance. His wife was the one who noticed the L-sided weakness.     Interval Events:  Right carotid endarterectomy 7/13 (70% stenosis)    Consultants/Specialty:  Neurology  Vascular surgery    Review of Systems:    Review of Systems   Constitutional: Negative for chills and fever.   Respiratory: Negative for shortness of breath.    Cardiovascular: Negative for chest pain and palpitations.   Gastrointestinal: Negative for abdominal pain, nausea and vomiting.   Neurological: Negative for dizziness, sensory change, focal weakness and headaches.       Objective Data:   Physical Exam:   Vitals:   Temp:  [36.5 °C (97.7 °F)-36.9 °C (98.5 °F)] 36.5 °C (97.7 °F)  Pulse:  [] 94  Resp:  [16-18] 16  BP: (139-207)/() 207/136  SpO2:  [85 %-98 %] 97 %    Physical Exam  Constitutional:       General: He is not in acute distress.     Appearance: Normal appearance.   HENT:      Head: Normocephalic and atraumatic.   Eyes:      General: No scleral icterus.     Extraocular Movements: Extraocular movements intact.      Conjunctiva/sclera: Conjunctivae normal.      Pupils: Pupils are equal, round, and reactive to light.    Cardiovascular:      Rate and Rhythm: Normal rate and regular rhythm.      Heart sounds: No murmur.   Pulmonary:      Effort: Pulmonary effort is normal.      Breath sounds: Normal breath sounds. No wheezing.   Abdominal:      General: Abdomen is flat.      Palpations: Abdomen is soft.      Tenderness: There is no abdominal tenderness.   Musculoskeletal:      Right lower leg: No edema.      Left lower leg: No edema.   Skin:     General: Skin is warm and dry.   Neurological:      Mental Status: He is alert and oriented to person, place, and time.      Cranial Nerves: Cranial nerve deficit present.      Sensory: No sensory deficit.      Motor: No weakness.      Coordination: Coordination normal.      Deep Tendon Reflexes: Reflexes normal.      Comments: Unable to raise R eyebrow (Patient described as chronic s/p R parotid tumor removal)   Psychiatric:         Mood and Affect: Mood normal.         Behavior: Behavior normal.       Labs:   Na 125 low (126 on admission)  Cl 88 low  Anion gap 18 high  Alkaline phosphatase 107 high    TSH 11.7 high, 0.85 free T4 low  Ammonia <10  UDS neg  UA neg for UTI/blood/glucose  Trop neg 10  PT/PTT normal    Imaging:   CT head w/o contrast - negative for hemorrhage  CT angio head/neck - critical right ICA stenosis (30-40% stenosis, but calcific plaque cephalad causing 70% stenosis of ICA lumen, 30-40% left ICA stenosis)  CT cerebral perfusion analysis - blood flow <30% = completed infarct, T Max >6 sec = completed infarct and ischemia    CXR - neg for acute cardiopulmonary abnormality    Assessment and Plan:  72 yo M w/ hx of HTN (on Losartan), R parotid tumor, throat cancer (tonsil neoplasm, 2011, s/p chemo/radiation) who presents with left-sided facial droop, left-sided extremity weakness, and loss of consciousness.    # Left-sided weakness  Transient left-sided weakness and left facial droop consistent with critical R ICA stenosis found on CTA head and neck. Most likely 2/2 TIA  with history of HTN and likely long-standing atherosclerosis. ABCD score 4 (moderate risk for stroke after TIA, 4.1% in 2 days, 5.9% in 7 days, 9.8% in 90 days). Not TPA candidate. Patient last symptomatic at 7/12 7pm, but returned to baseline in ED. Vascular surgery (Dr. Cai) consulted, right carotid endarterectomy planned 7/13.  - Carotid endarterectomy planned for today 7/13  - Stroke work-up (Brain MRI w/o contrast, TTE with bubble study, telemetry for arrhythmia)  - Troponin x3  - Aspirin 81 mg and Plavix 75 mg (neurology and vascular surgery recommendations)  - Started atorvastatin 80 mg daily  - Neurochecks q4h  - Permissive HTN for first 24-48 hr (neurology recommendation). Tx only if BP >220/120 or signs of end-organ failure. SBP <140 afterwards.   - Blood glucose goal 140-180 (HbA1c 5.8%)  - Seizure/aspiration/fall precautions. Bedside swallow evaluation - Passed.   - PT/OT evaluation  - Consider Loop monitor outpatient    # Hyponatremia  Acute vs. chronic moderate hyponatremia. On admission 7/12, Na 126 (last known Na 131 on 7/12 1200). AO x4. Dizziness, headache, unsteadiness could be 2/2 hyponatremia. Patient given 500 cc NS bolus, sodium worsened from 126 to 125 indicating that patient is less likely to have hypovolemic hyponatremia. Euvolemic hyponatremia likely 2/2 ADH secretion which may be seen 2/2 hypothyroidism or SIADH from CNS disease (stroke).   - Check Urine electrolytes (urine sodium and creatinine)  - Check sodium daily  - Goal sodium correction <6-8 mEq in 24-hour period  - Treat underlying cause (hypothyroidism)  - Consider fluid restriction if SIADH becomes more likely.     # Hypothyroidism  Likely primary hypothyroidism. Elevated TSH 11.7, low free T4 0.85. No hx. May be 2/2 recent illness, prior radiation therapy to neck area, or chronic autoimmune hypothyroidism.   - Start levothyroxine 25 mcg daily  - Repeat TSH/free T4 in 4-6 weeks.     # Hypertension  Chronic essential HTN. On  Losartan  - Hold home med for permissive HTN

## 2020-07-13 NOTE — PROGRESS NOTES
Neurology Interval Note    CTA of neck showing critical stenosis of R ICA. Dr. Finch consisted vascular surgery to determine candidacy of CEA.     Given symptoms of L sided weakness are consistent with R ICA, this is a symptomatic carotid. Depending on timing of CEA would recommend DAPT. Vascular surgery to guide anti-platelet regimen and determine urgency of intervention.     Top of ddx is now TIA secondary to thromboembol ic phenomena. Continue permissive HTN.     GAURI Owusu MD  Neurology

## 2020-07-13 NOTE — DISCHARGE PLANNING
[facility] Transitional Care Coordination     Referral from:  Dr. Gunn    Facesheet indicates: [facility]    Potential Rehab Diagnosis: stenosis of R ICA.    Chart review indicates patient may have on going medical management and may have therapy needs to possibly meet inpatient rehab facility criteria with the goal of returning to community.    D/C support: TBD     Physiatry consultation pended per protocol.      CTA of neck showing critical stenosis of R ICA. Dr. Finch consisted vascular surgery to determine candidacy of CEA.  Would appreciate TX evals s/p surgical intervention if required.  Waiting on additional information to determine appropriateness for acute inpatient rehabilitation. Will continue to follow.     Thank you for the referral.

## 2020-07-13 NOTE — ANESTHESIA PROCEDURE NOTES
Arterial Line  Performed by: Elan Guzman D.O.  Authorized by: Elan Guzman D.O.     Start Time:  7/13/2020 9:58 AM  End Time:  7/13/2020 10:02 AM  Localization: ultrasound guidance and surface landmarks  Image captured, interpreted and electronically stored.  Patient Location:  OR  Indication: continuous blood pressure monitoring        Catheter Size:  20 G  Seldinger Technique?: Yes    Laterality:  Left  Site:  Radial artery  Line Secured:  Antimicrobial disc, tape and transparent dressing  Events: patient tolerated procedure well with no complications

## 2020-07-13 NOTE — H&P
I have personally seen and examined the patient and discussed the management with the resident.   I reviewed the resident's note and agree with the documented findings and plan of care.   Additional attending comments:  L sided stroke like symptoms 2/2 to R critical stenosis s/p endarectomy. May have been percipitated by orthostatic hypotension.       History & Physical Note    Date of Admission: 7/12/2020  Admission Status: Emergency  [facility] Team: [facility]  Attending: Dr. De La O  Senior Resident: Dr. Sharma  Intern: Dr. Bach  Contact Number: [phone]    Chief Complaint:  Syncope and left sided weakness     History of Present Illness (HPI):   75M with hx of HTN and throat cancer in 2011 s/p chemo/radiation presenting with syncope and left sided facial droop an left sided extremity weakness. Patient says he was standing in his kitchen at approximately 19:00 when he began feeling light-headed, sat down in a chair, and slumped over, losing consciousness for roughly 1-2 minutes. He denies numbness/tingling or motor weakness. He said his wife witnessed this and noted he was drooling. She did not see him convulse or twitch. Wife called EMS who noted left sided facial droop and left sided extremity weakness (per triage note). Patient notes his legs felt unsteady during the incident and a mild, dull, frontal headache for the past day. He denies falls, head trauma, chest pain, palpitations, nausea, vomiting, problems with speech, vision, or history of seizures. Patient says he had experienced a similar event a few months ago when his blood pressure medications were adjusted. Patient also says for the past few months, he has felt light headed and dizzy when standing from a seated position.     In the ED, the patient's deficits had resolved upon arrival at 19:36. CT head noncon was negative for hemorrhage. The patient was evaluated by neurology (Dr. Owusu) who recommended permissive hypertension. CT  angio head/neck showed critical right ICA stenosis and approximate 30-40% left ICA stenosis. Vascular surgery (Dr. Tarango) was then consulted and recommended to start patient on DAPT with ASA 81 mg and Plavix 75 mg and to keep patient NPO starting at midnight for possible surgical intervention tomorrow.    Review of Systems: Review of Systems   Constitutional: Negative for chills, fever and malaise/fatigue.   HENT: Negative for sore throat.         Chronically hard of hearing   Eyes: Negative for blurred vision and double vision.   Respiratory: Negative for cough and shortness of breath.    Cardiovascular: Negative for chest pain, palpitations and leg swelling.   Gastrointestinal: Negative for abdominal pain, constipation, diarrhea, nausea and vomiting.   Genitourinary: Negative for dysuria and urgency.   Musculoskeletal: Negative for falls and neck pain.   Skin: Negative for itching and rash.   Neurological: Positive for dizziness, focal weakness, loss of consciousness, weakness and headaches. Negative for tingling, sensory change, speech change and seizures.   Psychiatric/Behavioral: Negative for depression and suicidal ideas.        Past Medical History:    has a past medical history of CATARACT, History of malignant neoplasm of tonsil (2011), History of tongue cancer (2011), Late effect of radiation, and Radiation caries.    Past Surgical History:  has a past surgical history that includes other (1990s); cataract extraction with iol; septoplasty (7/15/2010); antrostomy (7/15/2010); ethmoidectomy (7/15/2010); tonsillectomy; laryngoscopy (8/23/2011); tumor excision with biopsy (8/23/2011); and vasectomy.    Medications:   Prior to Admission Medications   Prescriptions Last Dose Informant Patient Reported? Taking?   losartan (COZAAR) 50 MG Tab 7/12/2020 at 0900 Patient Yes No   Sig: Take 50 mg by mouth every day.      Facility-Administered Medications: None        Allergies: No Known Allergies    Family History:    family history includes Hypertension in his mother.   Mother: mild stroke 81 yo    Social History:   Tobacco: Denies   Alcohol: Occasional beer   Recreational drugs (illegal and prescription):  Denies   Employment: Retired   Activity Level: No recent change in activity level; ambulates at home   Living situation:  Lives with wife and daughter  Recent travel:    Primary Care Provider:  Alex Bunch M.D.      Vitals:  Temp:  [36.7 °C (98 °F)] 36.7 °C (98 °F)  Pulse:  [101-125] 102  Resp:  [18] 18  BP: (150-204)/() 170/81  SpO2:  [85 %-92 %] 92 %    Physical Exam  Constitutional:       General: He is not in acute distress.     Appearance: He is not ill-appearing, toxic-appearing or diaphoretic.   HENT:      Head: Normocephalic and atraumatic.      Right Ear: External ear normal.      Left Ear: External ear normal.      Mouth/Throat:      Mouth: Mucous membranes are moist.      Pharynx: Oropharynx is clear.   Eyes:      General: No scleral icterus.        Right eye: No discharge.         Left eye: No discharge.      Conjunctiva/sclera: Conjunctivae normal.      Pupils: Pupils are equal, round, and reactive to light.   Neck:      Musculoskeletal: Normal range of motion and neck supple.   Cardiovascular:      Rate and Rhythm: Normal rate and regular rhythm.      Pulses: Normal pulses.      Heart sounds: No murmur.   Pulmonary:      Effort: Pulmonary effort is normal. No respiratory distress.      Breath sounds: Normal breath sounds. No wheezing or rales.   Abdominal:      General: Abdomen is flat. There is no distension.      Palpations: Abdomen is soft.      Tenderness: There is no abdominal tenderness. There is no guarding.   Musculoskeletal: Normal range of motion.         General: No deformity.      Right lower leg: No edema.      Left lower leg: No edema.   Skin:     General: Skin is warm.      Capillary Refill: Capillary refill takes less than 2 seconds.      Coloration: Skin is not  jaundiced.      Findings: No lesion or rash.   Neurological:      General: No focal deficit present.      Mental Status: He is alert and oriented to person, place, and time. Mental status is at baseline.      Comments: NIH stroke scale: 0  CN II - XII grossly intact  Remembered month and age  Followed command to blink and squeeze hands.   Normal horizontal gaze.  Visual fields intact.  No motor drift in bilateral UE and LE  Finger nose and heel shin tests normal bilaterally.  Motor strength 5/5 in all four extremities.  Sensation to light touch intact in all four extremities.           Labs:   Results for orders placed or performed during the hospital encounter of 07/12/20   CBC WITH DIFFERENTIAL   Result Value Ref Range    WBC 6.7 4.8 - 10.8 K/uL    RBC 5.14 4.70 - 6.10 M/uL    Hemoglobin 16.0 14.0 - 18.0 g/dL    Hematocrit 46.5 42.0 - 52.0 %    MCV 90.5 81.4 - 97.8 fL    MCH 31.1 27.0 - 33.0 pg    MCHC 34.4 33.7 - 35.3 g/dL    RDW 40.9 35.9 - 50.0 fL    Platelet Count 188 164 - 446 K/uL    MPV 9.0 9.0 - 12.9 fL    Neutrophils-Polys 75.90 (H) 44.00 - 72.00 %    Lymphocytes 11.30 (L) 22.00 - 41.00 %    Monocytes 11.20 0.00 - 13.40 %    Eosinophils 0.90 0.00 - 6.90 %    Basophils 0.40 0.00 - 1.80 %    Immature Granulocytes 0.30 0.00 - 0.90 %    Nucleated RBC 0.00 /100 WBC    Neutrophils (Absolute) 5.09 1.82 - 7.42 K/uL    Lymphs (Absolute) 0.76 (L) 1.00 - 4.80 K/uL    Monos (Absolute) 0.75 0.00 - 0.85 K/uL    Eos (Absolute) 0.06 0.00 - 0.51 K/uL    Baso (Absolute) 0.03 0.00 - 0.12 K/uL    Immature Granulocytes (abs) 0.02 0.00 - 0.11 K/uL    NRBC (Absolute) 0.00 K/uL   COMP METABOLIC PANEL   Result Value Ref Range    Sodium 126 (L) 135 - 145 mmol/L    Potassium 4.2 3.6 - 5.5 mmol/L    Chloride 88 (L) 96 - 112 mmol/L    Co2 20 20 - 33 mmol/L    Anion Gap 18.0 (H) 7.0 - 16.0    Glucose 151 (H) 65 - 99 mg/dL    Bun 8 8 - 22 mg/dL    Creatinine 0.83 0.50 - 1.40 mg/dL    Calcium 9.2 8.5 - 10.5 mg/dL    AST(SGOT) 23 12 - 45  U/L    ALT(SGPT) 21 2 - 50 U/L    Alkaline Phosphatase 107 (H) 30 - 99 U/L    Total Bilirubin 0.5 0.1 - 1.5 mg/dL    Albumin 4.5 3.2 - 4.9 g/dL    Total Protein 7.0 6.0 - 8.2 g/dL    Globulin 2.5 1.9 - 3.5 g/dL    A-G Ratio 1.8 g/dL   PROTHROMBIN TIME   Result Value Ref Range    PT 12.6 12.0 - 14.6 sec    INR 0.91 0.87 - 1.13   APTT   Result Value Ref Range    APTT 25.2 24.7 - 36.0 sec   COD (ADULT)   Result Value Ref Range    ABO Grouping Only A (A)     Rh Grouping Only POS     Antibody Screen-Cod NEG    TROPONIN   Result Value Ref Range    Troponin T 10 6 - 19 ng/L   ABO Rh Confirm   Result Value Ref Range    ABO Rh Confirm A POS (A)    URINALYSIS    Specimen: Urine   Result Value Ref Range    Color Yellow     Character Clear     Specific Gravity 1.033 <1.035    Ph 8.0 5.0 - 8.0    Glucose Negative Negative mg/dL    Ketones Negative Negative mg/dL    Protein Negative Negative mg/dL    Bilirubin Negative Negative    Urobilinogen, Urine 0.2 Negative    Nitrite Negative Negative    Leukocyte Esterase Negative Negative    Occult Blood Negative Negative    Micro Urine Req see below    TSH WITH REFLEX TO FT4   Result Value Ref Range    TSH 11.700 (H) 0.380 - 5.330 uIU/mL   VITAMIN B12   Result Value Ref Range    Vitamin B12 -True Cobalamin 418 211 - 911 pg/mL   AMMONIA   Result Value Ref Range    Ammonia <10 (L) 11 - 45 umol/L   URINE DRUG SCREEN   Result Value Ref Range    Amphetamines Urine Negative Negative    Barbiturates Negative Negative    Benzodiazepines Negative Negative    Cocaine Metabolite Negative Negative    Methadone Negative Negative    Opiates Negative Negative    Oxycodone Negative Negative    Phencyclidine -Pcp Negative Negative    Propoxyphene Negative Negative    Cannabinoid Metab Negative Negative   ESTIMATED GFR   Result Value Ref Range    GFR If African American >60 >60 mL/min/1.73 m 2    GFR If Non African American >60 >60 mL/min/1.73 m 2   FREE THYROXINE   Result Value Ref Range    Free T-4  0.85 (L) 0.93 - 1.70 ng/dL        EKG: No results found for this or any previous visit.     Imaging:   DX-CHEST-PORTABLE (1 VIEW)   Final Result      No acute cardiopulmonary abnormality.      CT-HEAD WITH   Final Result      Head CT with contrast within normal limits. No evidence of acute cerebral infarction, hemorrhage, mass lesion, or abnormal enhancement.      CT-CTA NECK WITH & W/O-POST PROCESSING   Final Result      1.  Critical right ICA stenosis as described above.   2.  Approximate 30-40% left ICA stenosis.   3.  Normal appearance of the cervical vertebral arteries.      CT-CTA HEAD WITH & W/O-POST PROCESS   Final Result      1.  Focal stenosis of the left internal carotid artery just below the skull base with approximately 50% resultant stenosis.   2.  Otherwise normal CT angiography of the head.      CT-CEREBRAL PERFUSION ANALYSIS   Final Result      1.  Cerebral blood flow less than 30% likely representing completed infarct = 0 mL.      2.  T Max more than 6 seconds likely representing combination of completed infarct and ischemia = 0 mL.      3.  Mismatched volume likely representing ischemic brain/penumbra = None      4.  Please note that the cerebral perfusion was performed on the limited brain tissue around the basal ganglia region. Infarct/ischemia outside the CT perfusion sections can be missed in this study.      CT-HEAD W/O   Final Result      Normal CT scan of the head without contrast.               INTERPRETING LOCATION:  [address] [place] [address], [facility]           Previous Data Review: Reviewed    Assessment and Plan    #TIA  Assessment  - last known normal 7 PM; left sided facial droop with left sided extremity weakness that resolved by arrival to ED  - Not a candidate for thrombolytic therapy IR thrombectomy since patient has returned to baseline  - CT head noncon was negative for hemorrhage.  - ABCD2 score = 5 (moderate risk); 4.1% 2-day stroke risk    Plan  - Lipid profile  - atorvastatin  started  - bedside swallow eval  - inpatient consult to   - PT/OT eval  - Neuro checks q4 hours  - seizure/aspiration/fall precautions  Neurology (Dr. Owusu) Recs:  - Permissive hypertension for 24-48 hours from onset of symptoms followed by goal of systolic BP <140. Long term BP goal (s/p 1-2 weeks from onset) is normotension  - normoglycemia, normothermia, and avoid hypo- or hyper natremia  - Toxic metabolic workup: ammonia, B12, TSH, UDS     #Right ICA critical stenosis  Assessment:  - CT angio head/neck showed critical right ICA stenosis and approximate 30-40% left ICA stenosis.     Plan:  Vascular surgery (Dr. Tarango) Recs:  - surgical intervention today (7/13)  - NPO at midnight  - start DAPT ASA 81 and Plavix 75    #Hyponatremia  Assessment:  - Na 124  - Patient was asymptomatic on exam    Plan:  - Bolus 500 mL NS  - recheck Na level after bolus  - urine sodium and urine creatinine  - goal 6-8 mmol/L in 24 hours max    #Syncope  Assessment  - Patient said he has had a previous near-syncopal episode after his BP medications were adjusted    Plan:  - orthostatics ordered  - fall precautions    #Hypertension  - home losartan held for permissive hypertension  - day team to restart when appropriate    #Misc  - SCDs for DVT ppx pending surgery today

## 2020-07-13 NOTE — CARE PLAN
Problem: Communication  Goal: The ability to communicate needs accurately and effectively will improve  Outcome: PROGRESSING AS EXPECTED  Intervention: Wed[place] patient and significant other/support system to call light to alert staff of needs  Flowsheets (Taken 7/13/2020 0042)  Oriented to:: All of the Following : Location of Bathroom, Visiting Policy, Unit Routine, Call Light and Bedside Controls, Bedside Rail Policy, Smoking Policy, Rights and Responsibilities, Bedside Report, and Patient Education Notebook  Note: Pt updated on plan of care. Patient expresses understanding. All questions answered. Call light within reach and educated on how to use it.      Problem: Infection  Goal: Will remain free from infection  Outcome: PROGRESSING AS EXPECTED  Intervention: Assess signs and symptoms of infection  Note: Pt verbalized understanding of the importance of hand hygiene. Pt assessed for signs and symptoms of infection.

## 2020-07-13 NOTE — PROGRESS NOTES
75-year-old male presented to the hospital for strokelike symptoms.  Symptoms started around 7 PM he developed sudden onset of lightheadedness and then he developed loss of consciousness.  Neurology evaluated him and recommended permissive hypertension.  I discussed case with neurologist Dr. Owusu and he recommended dual antiplatelet therapy if vascular surgery is okay with that.  I discussed case with Dr. Tarango vascular surgery who recommended okay to start patient on dual antiplatelet therapy aspirin 81 mg and Plavix 75 mg.  Also Dr. Tarango recommended to keep patient n.p.o. from midnight for possible surgical intervention tomorrow.  I personally discussed case with [facility] internal medicine resident Dr. Salazar discuss about patient current condition and and updated him regarding my discussion with neurologist Dr. Owusu as well as with vascular surgeon Dr. Muñoz.  Requested him to keep patient n.p.o. possible surgical intervention tomorrow.    Patient is going to be admitted by [facility] internal medicine.

## 2020-07-13 NOTE — ANESTHESIA PREPROCEDURE EVALUATION
Relevant Problems   NEURO   (+) History of malignant neoplasm of tonsil   (+) History of tongue cancer     Near syncopal event with TIA d/t severe right carotid stenosis    Physical Exam    Airway   Mallampati: II  TM distance: >3 FB  Neck ROM: full       Cardiovascular - normal exam  Rhythm: regular  Rate: normal  (-) murmur     Dental - normal exam           Pulmonary - normal exam  Breath sounds clear to auscultation     Abdominal    Neurological - normal exam                 Anesthesia Plan    ASA 3 (Carotid Stenosis)- EMERGENT (Severe carotid stenosis s/p TIA)   ASA physical status 3 criteria: other (comment) and CVA or TIA - history (> 3 months)ASA physical status emergent criteria: neurologic compromise requiring immediate intervention, compromised vital organ, limb or tissue and other (comment)    Plan - general       Airway plan will be ETT        Induction: intravenous    Postoperative Plan: Postoperative administration of opioids is intended.    Pertinent diagnostic labs and testing reviewed    Informed Consent:    Anesthetic plan and risks discussed with patient.    Use of blood products discussed with: patient whom consented to blood products.

## 2020-07-13 NOTE — OR NURSING
Respirations easy in PACU.  Gauze dressing clean and dry to right neck.  No bleeding, no hematoma.  Gauze over HECTOR insertion site clean and dry.  HECTOR to compressed bulb suction, draining small amount of blood.  HOB elevated.  Left radial arterial line zeroed, compares to cuff BP. Patient denies pain and nausea.  Throughout PACU stay, patient is oriented times 4, smiles evenly, sticks tongue out to midline,  are strong and equal, MAEW. He speaks clearly.  1415  Arterial line dc'ed, site clean and dry.  Report called to floor.

## 2020-07-13 NOTE — ANESTHESIA TIME REPORT
Anesthesia Start and Stop Event Times     Date Time Event    7/13/2020 0941 Ready for Procedure     0947 Anesthesia Start     1140 Anesthesia Stop        Responsible Staff  07/13/20    Name Role Begin End    Elan Guzman D.O. Anesth 0947 1140        Preop Diagnosis (Free Text):  Pre-op Diagnosis     Right Carotid Artery Stenosis        Preop Diagnosis (Codes):    Post op Diagnosis  Carotid stenosis, right      Premium Reason  D. Post-4th    Comments:

## 2020-07-13 NOTE — DISCHARGE PLANNING
Medical Social Work    Referral: Stroke    Intervention: Pt is a 75 year old male brought in by [facility] from home for possible stroke.  Pt is Marco Echeverria (: 1945).  Per [facility] pt's wife Hetal ([phone]) is expecting a call as she's not sure she will be coming into the hospital.  MSW contacted pt's wife, Hetal and informed her that pt just arrived.  Hetal states that she's waiting on her daughter to arrive to her house as she doesn't feel comfortable driving herself to the hospital.  Hetal was advised that there is a one visitor restriction at this time; she reports understanding.      Plan: SW will follow as needed.

## 2020-07-13 NOTE — ED TRIAGE NOTES
"Chief Complaint   Patient presents with   • Possible Stroke     Pt last seen normal at 1900. The pt reports he walked up from the bathroom and became dizzy, almost causing a near syncopal event. Pt's wife called EMS. Upon their assessment, they noted L sided facial droop and L sided extremity weakness.     BP (!) 204/108   Pulse (!) 112   Temp 36.7 °C (98 °F) (Temporal)   Resp 18   Ht 1.88 m (6' 2\")   Wt 81.6 kg (180 lb)   SpO2 91%   BMI 23.11 kg/m²     Pt's symptoms resolved prior to the pt coming into the ED at around 1934. The pt is aaox4, resting on RA, with no complaints at this time.     Chart up for eval.    "

## 2020-07-13 NOTE — PROGRESS NOTES
Neurology Progress Note  Neurohospitalist Service, [facility]    Referring Physician: Jake De La O M.D.    Chief Complaint   Patient presents with   • Possible Stroke     Pt last seen normal at 1900. The pt reports he walked up from the bathroom and became dizzy, almost causing a near syncopal event. Pt's wife called EMS. Upon their assessment, they noted L sided facial droop and L sided extremity weakness.       HPI: Refer to initial documented Neurology H&P, as detailed in the patient's chart.    Interval History 7/13/2020: No acute events overnight. NIHSS 0 preoperatively this morning per RN report. Patient underwent planned right carotid endarterectomy today for high grade right ICA stenosis 70% seen on CTA neck w/wo contrast overnight. Currently back in room from PACU, sitting up in bed, awake, alert, fully oriented, and following commands. All presenting neurological symptoms remain resolved. No current medical complaints.     Past Medical History:   Past Medical History:   Diagnosis Date   • CATARACT     has had surgical correction bilaterally   • History of malignant neoplasm of tonsil 2011    s/p Resection, chemoradiation   • History of tongue cancer 2011    s/p Resection, chemoradiation   • Late effect of radiation    • Radiation caries         FHx:  Family History   Problem Relation Age of Onset   • Hypertension Mother         SHx:  Social History     Socioeconomic History   • Marital status:      Spouse name: Hetal   • Number of children: 2   • Years of education: Not on file   • Highest education level: Not on file   Occupational History   • Not on file   Social Needs   • Financial resource strain: Not on file   • Food insecurity     Worry: Not on file     Inability: Not on file   • Transportation needs     Medical: Not on file     Non-medical: Not on file   Tobacco Use   • Smoking status: Former Smoker   • Smokeless tobacco: Former User     Quit date: 5/1/1975   • Tobacco  comment: 1-2 pk a day for 15 yrs   Substance and Sexual Activity   • Alcohol use: Yes     Comment: 1 a month   • Drug use: No   • Sexual activity: Not on file   Lifestyle   • Physical activity     Days per week: Not on file     Minutes per session: Not on file   • Stress: Not on file   Relationships   • Social connections     Talks on phone: Not on file     Gets together: Not on file     Attends Rastafari service: Not on file     Active member of club or organization: Not on file     Attends meetings of clubs or organizations: Not on file     Relationship status: Not on file   • Intimate partner violence     Fear of current or ex partner: Not on file     Emotionally abused: Not on file     Physically abused: Not on file     Forced sexual activity: Not on file   Other Topics Concern   • Not on file   Social History Narrative    41 years -  - now retired.  Retired 4/1/2007        Medications:    Current Facility-Administered Medications:   •  Pharmacy Consult Request ...Pain Management Review 1 Each, 1 Each, Other, PHARMACY TO DOSE, Annelise Whitmore, A.P.N.  •  ondansetron (ZOFRAN) syringe/vial injection 4 mg, 4 mg, Intravenous, Q4HRS PRN, Annelise Whitmore, A.P.N.  •  dexamethasone (DECADRON) injection 4 mg, 4 mg, Intravenous, Once PRN, Annelise Whitmore, A.P.N.  •  diphenhydrAMINE (BENADRYL) injection 25 mg, 25 mg, Intravenous, Q6HRS PRN, Annelise Whitmore, A.P.N.  •  haloperidol lactate (HALDOL) injection 1 mg, 1 mg, Intravenous, Q6HRS PRN, Annelise Whitmore, A.P.N.  •  scopolamine (TRANSDERM-SCOP) patch 1 Patch, 1 Patch, Transdermal, Q72HRS PRN, Annelise Whitmore, A.P.N.  •  dextrose 5 % and 0.45 % NaCl with KCl 20 mEq, , Intravenous, Q6HR, Annelise Whitmore, A.P.N.  •  oxyCODONE immediate-release (ROXICODONE) tablet 5 mg, 5 mg, Oral, Q3HRS PRN, Annelise Whitmore, A.P.N.  •  cloNIDine (CATAPRES) tablet 0.2 mg, 0.2 mg, Oral, Once PRN **AND** cloNIDine (CATAPRES) tablet 0.1 mg, 0.1 mg, Oral, Q HOUR PRN, JOSE DE JESUS Reich.  •  labetalol  (NORMODYNE/TRANDATE) injection 10 mg, 10 mg, Intravenous, Q4HRS PRN, Annelise Whitmore, A.P.N.  •  hydrALAZINE (APRESOLINE) injection 10 mg, 10 mg, Intravenous, Q4HRS PRN, ANTHONY Reich.P.N.  •  senna-docusate (PERICOLACE or SENOKOT S) 8.6-50 MG per tablet 2 Tab, 2 Tab, Oral, BID, Stopped at 07/12/20 2300 **AND** polyethylene glycol/lytes (MIRALAX) PACKET 1 Packet, 1 Packet, Oral, QDAY PRN **AND** magnesium hydroxide (MILK OF MAGNESIA) suspension 30 mL, 30 mL, Oral, QDAY PRN **AND** bisacodyl (DULCOLAX) suppository 10 mg, 10 mg, Rectal, QDAY PRN, Mahendra RIOS M.D.  •  Pharmacy consult request - Allow for permissive hypertension: SBP up to 220 mmHg/DBP up to 120 mmHg x 48 hours, , Other, PHARMACY TO DOSE, Mahendra RIOS M.D.  •  clopidogrel (PLAVIX) tablet 75 mg, 75 mg, Oral, DAILY, Mahendra RIOS M.D., Stopped at 07/12/20 2315  •  atorvastatin (LIPITOR) tablet 80 mg, 80 mg, Oral, Q EVENING, Mahendra RIOS M.D., Stopped at 07/12/20 2315    Allergies:  No Known Allergies     Review of systems: In addition to what is detailed in the HPI and interval history above, all other systems reviewed and are negative.      Physical Examination:  Vitals:    07/13/20 1345 07/13/20 1400 07/13/20 1415 07/13/20 1430   BP:  108/50 109/60 110/59   Pulse: 78 84 78 79   Resp: 16 16 16 16   Temp:    36.5 °C (97.7 °F)   TempSrc:    Temporal   SpO2: 94% 95% 97% 96%   Weight:       Height:         General: Patient in no acute distress, pleasant and cooperative.  HEENT: Normocephalic, no signs of acute trauma.   Neck: Post-op R CEA with HECTOR drain, dressing covering incision C/D/I. Supple, no meningeal signs or carotid bruits. There is normal range of motion. No tenderness on exam.   Chest: clear to auscultation. No cough.   CV: RRR, no murmurs.   Skin: no signs of acute rashes or trauma.   Musculoskeletal: joints exhibit full range of motion, without any pain to palpation. There are no signs of joint or muscle swelling. There is no  tenderness to deep palpation of muscles.   Psychiatric: No hallucinatory behavior. Denies symptoms of depression or suicidal ideation. Mood and affect appear normal on exam.     NEUROLOGICAL EXAM:  Mental status, orientation: Awake, alert and fully oriented.   Speech and language: speech is clear and fluent. The patient is able to name, repeat and comprehend.   Memory: There is intact recollection of recent and remote events.   Cranial nerve exam: Pupils are 3-4 mm bilaterally and equally reactive to light and accommodation. Visual fields are intact by confrontation. There is no nystagmus on primary or secondary gaze. Intact full EOM in all directions of gaze. Face appears asymmetric with right upper and lower facial weakness, baseline per patient s/p remote neoplasm resection. Sensation in the face is intact to light touch. Uvula is midline. Palate elevates symmetrically. Tongue is midline and without any signs of tongue biting or fasciculations. Sternocleidomastoid muscles exhibit is normal strength bilaterally. Shoulder shrug is intact bilaterally.   Motor exam: Strength is 5/5 in all extremities. Tone is normal. No abnormal movements were seen on exam.   Sensory exam reveals normal sense of light touch in all extremities.   Deep tendon reflexes:  2+ throughout. Plantar responses are flexor. There is no clonus.   Coordination: shows a normal finger-nose-finger and heel-down-shin. Normal rapidly alternating movements.   Gait: Deferred as patient just returned to room from PACU s/p right CEA, high fall risk.     NIH Stroke Scale  7/13/20    1a. Level of Consciousness (Alert, drowsy, etc): 0= Alert    1b. LOC Questions (Month, age): 0= Answers both correctly    1c. LOC Commands (Open/close eyes make fist/let go): 0= Obeys both correctly    2.   Best Gaze (Eyes open - patient follows examiner's finger on face): 0= Normal    3.   Visual Fields (introduce visual stimulus/threat to patient's field quadrants): 0= No  visual loss    4.   Facial Paresis (Show teeth, raise eyebrows and squeeze eyes shut): 1= Minor*     5a. Motor Arm - Left (Elevate arm to 90 degrees if patient is sitting, 45 degrees if  supine): 0= No drift    5b. Motor Arm - Right (Elevate arm to 90 degrees if patient is sitting, 45 degrees if supine): 0= No drift    6a. Motor Leg - Left (Elevate leg 30 degrees with patient supine): 0= No drift    6b. Motor Leg - Right  (Elevate leg 30 degrees with patient supine): 0= No drift    7.   Limb Ataxia (Finger-nose, heel down shin): 0= No ataxia    8.   Sensory (Pin prick to face, arm, trunk and leg - compare side to side): 0= Normal    9.  Best Language (Name item, describe a picture and read sentences): 0= No aphasia    10. Dysarthria (Evaluate speech clarity by patient repeating listed words): 0= Normal articulation    11. Extinction and Inattention (Use information from prior testing to identify neglect or  double simultaneous stimuli testing): 0= No neglect    Total NIH Score: 1* baseline right facial droop s/p remote neoplasm resection with nerve damage per patient.        Ancillary Data Reviewed:    Labs:  Lab Results   Component Value Date/Time    PROTHROMBTM 12.6 07/12/2020 07:40 PM    INR 0.91 07/12/2020 07:40 PM      Lab Results   Component Value Date/Time    WBC 4.4 (L) 07/13/2020 12:46 AM    RBC 5.27 07/13/2020 12:46 AM    HEMOGLOBIN 16.5 07/13/2020 12:46 AM    HEMATOCRIT 48.3 07/13/2020 12:46 AM    MCV 91.7 07/13/2020 12:46 AM    MCH 31.3 07/13/2020 12:46 AM    MCHC 34.2 07/13/2020 12:46 AM    MPV 8.9 (L) 07/13/2020 12:46 AM    NEUTSPOLYS 75.90 (H) 07/12/2020 07:40 PM    LYMPHOCYTES 11.30 (L) 07/12/2020 07:40 PM    MONOCYTES 11.20 07/12/2020 07:40 PM    EOSINOPHILS 0.90 07/12/2020 07:40 PM    BASOPHILS 0.40 07/12/2020 07:40 PM    HYPOCHROMIA 1+ 07/12/2010 12:00 PM    ANISOCYTOSIS 1+ 07/12/2010 12:00 PM      Lab Results   Component Value Date/Time    SODIUM 128 (L) 07/13/2020 06:29 AM    SODIUM 125 (L)  07/13/2020 06:29 AM    POTASSIUM 5.0 07/13/2020 06:29 AM    CHLORIDE 92 (L) 07/13/2020 06:29 AM    CO2 22 07/13/2020 06:29 AM    GLUCOSE 106 (H) 07/13/2020 06:29 AM    BUN 8 07/13/2020 06:29 AM    CREATININE 0.66 07/13/2020 06:29 AM      Lab Results   Component Value Date/Time    CHOLSTRLTOT 165 07/13/2020 06:29 AM     (H) 07/13/2020 06:29 AM    HDL 53 07/13/2020 06:29 AM    TRIGLYCERIDE 62 07/13/2020 06:29 AM       Lab Results   Component Value Date/Time    ALKPHOSPHAT 88 07/13/2020 06:29 AM    ASTSGOT 18 07/13/2020 06:29 AM    ALTSGPT 20 07/13/2020 06:29 AM    TBILIRUBIN 0.5 07/13/2020 06:29 AM        Imaging/Testing:    I interpreted and/or reviewed the patient's neuroimaging    DX-CHEST-PORTABLE (1 VIEW)   Final Result      No acute cardiopulmonary abnormality.      CT-HEAD WITH   Final Result      Head CT with contrast within normal limits. No evidence of acute cerebral infarction, hemorrhage, mass lesion, or abnormal enhancement.      CT-CTA NECK WITH & W/O-POST PROCESSING   Final Result      1.  Critical right ICA stenosis as described above.   2.  Approximate 30-40% left ICA stenosis.   3.  Normal appearance of the cervical vertebral arteries.      CT-CTA HEAD WITH & W/O-POST PROCESS   Final Result      1.  Focal stenosis of the left internal carotid artery just below the skull base with approximately 50% resultant stenosis.   2.  Otherwise normal CT angiography of the head.      CT-CEREBRAL PERFUSION ANALYSIS   Final Result      1.  Cerebral blood flow less than 30% likely representing completed infarct = 0 mL.      2.  T Max more than 6 seconds likely representing combination of completed infarct and ischemia = 0 mL.      3.  Mismatched volume likely representing ischemic brain/penumbra = None      4.  Please note that the cerebral perfusion was performed on the limited brain tissue around the basal ganglia region. Infarct/ischemia outside the CT perfusion sections can be missed in this study.       CT-HEAD W/O   Final Result      Normal CT scan of the head without contrast.               INTERPRETING LOCATION:  [facility], [facility], [address]          Assessment:    Marco Echeverria is a 75 y.o. male with relevant history of tongue cancer s/p radiation and chronic left LMN facial droop presenting for transient left-sided weakness affecting face and arm whom neurology was consulted to address possible stroke. The patient was deemed not a IV thrombolytic or IR thrombectomy candidate as symptoms resolved with patient return to baseline. CT head without contrast was within normal limits.  CTA neck revealed critical symptomatic right ICA stenosis of approximately 70%. Dr. Tarango of vascular surgery was consulted, and patient underwent right carotid enterectomy today without complications. NIHSS is 1 today significant only for chronic right LMN facial droop.     Impression: Transient ischemic attack in the setting of symptomatic critical right ICA stenosis of 70% s/p right CEA    Plan:  -q4h and PRN neuro assessment. VS per nursing/unit protocol. Defer to Vascular Surgery regarding BP goals s/p right CEA. Antihypertensives per primary team.   -Obtain routine MRI Brain wo contrast.   -Telemetry and pulse oximetry; currently SR. Screen for Afib/arrhythmia. Obtain TTE with bubble study.   -Defer to Vascular Surgery regarding antiplatelet regimen s/p right CEA.   -Continue Atorvastatin 80 mg PO q HS. Note lipid panel:  (goal <100) and HDL 53 (within goal >40).   -Recommend aggressive BG management per primary team. Avoid IVF with Dextrose. BG goal 140-180. Note hemoglobin A1c: 5.8 (within goal <6.4).   -Toxic/metabolic workup revealed elevated TSH of 11.7 and low T4 of 0.85, indicative of hypothyroidism. Recommend levothyroxine, defer to primary team for dosage and management.    -Continued hyponatremia with Na 126, 125, and 128 since admission may also be explained by  hypothyroidism. Patient receiving LR for  slow repletion of sodium per primary team.   -PT/OT/SLP eval and treat. See notes for recommendations respectively.   -Counseled patient at length regarding life style and risk factor modification for secondary stroke prevention, including but not limited to BP management, BG control, and cholesterol management.   -All other medical management per primary team.   -DVT PPX: SCDs.   -Follow-up outpatient with [facility]. Referral placed by me today.        The evaluation of the patient, and recommended management, was discussed with Dr. Nupur Rico, Dr. De La O, and bedside RN. I have performed a physical exam and reviewed and updated ROS and Plan today (7/13/2020). In review of yesterday's note (7/12/2020), there are no changes except as documented above.    Will follow up MRI brain wo CST and TTE with bubble study results and make further recommendations at that time if any abnormalities seen. No further recommendations or further studies from a neurological standpoint at this time. Please re-consult if you have further questions or there is a change in status.      ANTHONY [email or web address].   Nurse Practitioner, Neurohospitalist  [facility]  t) [phone] (f) [phone]

## 2020-07-13 NOTE — ANESTHESIA PROCEDURE NOTES
Airway    Date/Time: 7/13/2020 9:53 AM  Performed by: Elan Guzman D.O.  Authorized by: Elan Guzman D.O.     Location:  OR  Urgency:  Elective  Indications for Airway Management:  Anesthesia      Spontaneous Ventilation: absent    Sedation Level:  Deep  Preoxygenated: Yes    Patient Position:  Sniffing  Mask Difficulty Assessment:  0 - not attempted  Final Airway Type:  Endotracheal airway  Final Endotracheal Airway:  ETT  Cuffed: Yes    Technique Used for Successful ETT Placement:  Direct laryngoscopy    Insertion Site:  Oral  Blade Type:  Cuadra  Laryngoscope Blade/Videolaryngoscope Blade Size:  3  ETT Size (mm):  7.5  Measured from:  Teeth  ETT to Teeth (cm):  25  Placement Verified by: auscultation and capnometry    Cormack-Lehane Classification:  Grade IIb - view of arytenoids or posterior of glottis only  Number of Attempts at Approach:  1

## 2020-07-13 NOTE — SENIOR ADMIT NOTE
Senior Admit Note    Marco Echeverria is a 75 y.o. male who presents to the hospital after getting up to go to the bathroom and becoming dizzy and almost losing consciousness.  Patient was evaluated by his wife who noted that the patient had a left-sided facial droop as well as left upper and lower extremity weakness.      In the emergency department, the patient was hemodynamically stable.  He had a CTA of the head and neck which showed critical stenosis of the right ICA.  Vascular surgery, Dr. Cai, was consulted and plans for surgical intervention in the a.m.    Pertinent physical exam:   General: Resting comfortably, in no acute distress.  HEENT: No facial droop, pupils equal and reactive to light.  Heart: Regular rate and rhythm.  Negative S3.  Negative S4.  Lungs clear to auscultation throughout  Abdomen: Soft, nontender normal bowel sounds.  Neuro: Alert and oriented x4.  No focal deficits.  Sensation intact in face bilaterally, bilateral lower and upper extremities.  Handgrip in bilateral upper extremities 5 out of 5.  Strength in bilateral biceps 5 out of 5.  Strength in the lateral triceps 5 out of 5.  Strength in bilateral quadriceps 5 out of 5.  Patellar reflexes 2+    Labs:  Sodium low at 126, potassium 4.2, Chloride 88, CO2: 20  Alk phos: 107, AST 23, ALT 21    Imaging:   CT Head with no acute intracranial abnormalities  CTA head and neck: Right ICA occlusion    Assessment and plan:  #Acute Ischemic Stroke  #Right RCA stenosis  -Evaluated by neurology  -Patient not a TPA candidate  -CTA head and neck revealed right ICA occlusion/critical stenosis  Plan:  - Admit to neurology  -Every 4 hour neuro checks  - Post stroke TPA protocol  -Permissive hypertension for first 24 to 48 hours  - Aspirin 81 mg and Plavix 75 mg per neurology (Dr. Dariel Owusu) and vascular surgery recommendations  - Dr. Tarango vascular surgery was consulted in the ED planning for vascular procedure in a.m.  -Patient will be kept  n.p.o. from midnight.  --Start atorvastatin 80 mg daily    #Hyponatremia  -Patient sodium was low at 126; He is asymptomatic and A&O x4.   - We will check urine lites, FENa  -Appears clinically hypovolemic  - Gentle 500 cc IV fluid bolus of NS.  Recheck sodium  - Goal of sodium correction of no more than 6 to 8 mEq per 24-hour period.    Please see Dr. Gunn's H&P for full details    Aldair Salazar D.O., R Internal Medicine Resident

## 2020-07-13 NOTE — ED PROVIDER NOTES
ED Provider Note    Scribed for Rashi Nielsen M.D. by Moses Tenorio. 7/12/2020, 7:39 PM.    Primary care provider: Alex Bunch M.D.  Means of arrival: EMS  History obtained from: EMS  History limited by: None    CHIEF COMPLAINT  Chief Complaint   Patient presents with   • Possible Stroke     Pt last seen normal at 1900. The pt reports he walked up from the bathroom and became dizzy, almost causing a near syncopal event. Pt's wife called EMS. Upon their assessment, they noted L sided facial droop and L sided extremity weakness.       HPI  Marco Echeverria is a 75 y.o. male who presents to the Emergency Department for evaluation of stroke like symptoms. Per EMS the patient was acting normal and appeared to be at baseline throughout the day other than a mild headache. Just before 7 PM today the patient was in the kitchen washing dishes when he had a sudden onset of lightheadedness, and thus went to sit down. At 7:04 PM, his wife walked in to find the patient slumped over unconscious in his chair with a left sided facial droop and left arm weakness. These symptoms lasted for 5 minutes and the patients wife called EMS over concerns of these symptoms. Patient denies having any chest pain, shortness of breath, nausea, vomiting, slurred speech, or right sided weakness.    REVIEW OF SYSTEMS  Pertinent positives include lightheadedness, left arm weakness, facial droop. Pertinent negatives include chest pain, shortness of breath, nausea, vomiting, slurred speech, right sided weakness. All other systems negative.    PAST MEDICAL HISTORY   has a past medical history of CATARACT, History of malignant neoplasm of tonsil (2011), History of tongue cancer (2011), Late effect of radiation, and Radiation caries.    SURGICAL HISTORY   has a past surgical history that includes other (1990s); cataract extraction with iol; septoplasty (7/15/2010); antrostomy (7/15/2010); ethmoidectomy (7/15/2010); tonsillectomy;  "laryngoscopy (8/23/2011); tumor excision with biopsy (8/23/2011); and vasectomy.    SOCIAL HISTORY  Social History     Tobacco Use   • Smoking status: Former Smoker   • Smokeless tobacco: Former User     Quit date: 5/1/1975   • Tobacco comment: 1-2 pk a day for 15 yrs   Substance Use Topics   • Alcohol use: Yes     Comment: 1 a month   • Drug use: No      Social History     Substance and Sexual Activity   Drug Use No       FAMILY HISTORY  Family History   Problem Relation Age of Onset   • Hypertension Mother        CURRENT MEDICATIONS  Home Medications     Reviewed by Nacho Burns (Pharmacy Tech) on 07/12/20 at 2218  Med List Status: Complete   Medication Last Dose Status   losartan (COZAAR) 50 MG Tab  Active                ALLERGIES  No Known Allergies    PHYSICAL EXAM  VITAL SIGNS: BP (!) 204/108   Pulse (!) 112   Temp 36.7 °C (98 °F) (Temporal)   Resp 18   Ht 1.88 m (6' 2\")   Wt 81.6 kg (180 lb)   SpO2 91%   BMI 23.11 kg/m²     Constitutional: Well developed, Well nourished, no apparent distress.   HENT: Normocephalic, Atraumatic, mask in place, no facial droop  Eyes: Conjunctiva normal, No discharge. PERRL at 3 mm, EOMI no visual field deficits  Neck: Supple, No stridor   Cardiovascular: Normal heart rate, Normal rhythm, No murmurs, equal pulses.   Pulmonary: Normal breath sounds, No respiratory distress, No wheezing, No rales, No rhonchi.  Chest: No chest wall tenderness or deformity.   Abdomen:Soft, No tenderness, No masses, no rebound, no guarding.   Musculoskeletal: No major deformities noted, No tenderness.  5 strength in upper and lower extremities, no drift  Skin: Warm, Dry, No erythema, No rash.   Neurologic: Alert & oriented x 3, Normal motor function,  No focal deficits noted. No visual field defects,Normal finger to nose, Normal cranial nerves II-XII, No pronator drift. Equal strength in upper and lower extremities bilaterally.   NIH Score of 0.  Psychiatric: Affect normal, Judgment " normal, Mood normal.     LABS  Results for orders placed or performed during the hospital encounter of 07/12/20   CBC WITH DIFFERENTIAL   Result Value Ref Range    WBC 6.7 4.8 - 10.8 K/uL    RBC 5.14 4.70 - 6.10 M/uL    Hemoglobin 16.0 14.0 - 18.0 g/dL    Hematocrit 46.5 42.0 - 52.0 %    MCV 90.5 81.4 - 97.8 fL    MCH 31.1 27.0 - 33.0 pg    MCHC 34.4 33.7 - 35.3 g/dL    RDW 40.9 35.9 - 50.0 fL    Platelet Count 188 164 - 446 K/uL    MPV 9.0 9.0 - 12.9 fL    Neutrophils-Polys 75.90 (H) 44.00 - 72.00 %    Lymphocytes 11.30 (L) 22.00 - 41.00 %    Monocytes 11.20 0.00 - 13.40 %    Eosinophils 0.90 0.00 - 6.90 %    Basophils 0.40 0.00 - 1.80 %    Immature Granulocytes 0.30 0.00 - 0.90 %    Nucleated RBC 0.00 /100 WBC    Neutrophils (Absolute) 5.09 1.82 - 7.42 K/uL    Lymphs (Absolute) 0.76 (L) 1.00 - 4.80 K/uL    Monos (Absolute) 0.75 0.00 - 0.85 K/uL    Eos (Absolute) 0.06 0.00 - 0.51 K/uL    Baso (Absolute) 0.03 0.00 - 0.12 K/uL    Immature Granulocytes (abs) 0.02 0.00 - 0.11 K/uL    NRBC (Absolute) 0.00 K/uL   COMP METABOLIC PANEL   Result Value Ref Range    Sodium 126 (L) 135 - 145 mmol/L    Potassium 4.2 3.6 - 5.5 mmol/L    Chloride 88 (L) 96 - 112 mmol/L    Co2 20 20 - 33 mmol/L    Anion Gap 18.0 (H) 7.0 - 16.0    Glucose 151 (H) 65 - 99 mg/dL    Bun 8 8 - 22 mg/dL    Creatinine 0.83 0.50 - 1.40 mg/dL    Calcium 9.2 8.5 - 10.5 mg/dL    AST(SGOT) 23 12 - 45 U/L    ALT(SGPT) 21 2 - 50 U/L    Alkaline Phosphatase 107 (H) 30 - 99 U/L    Total Bilirubin 0.5 0.1 - 1.5 mg/dL    Albumin 4.5 3.2 - 4.9 g/dL    Total Protein 7.0 6.0 - 8.2 g/dL    Globulin 2.5 1.9 - 3.5 g/dL    A-G Ratio 1.8 g/dL   PROTHROMBIN TIME   Result Value Ref Range    PT 12.6 12.0 - 14.6 sec    INR 0.91 0.87 - 1.13   APTT   Result Value Ref Range    APTT 25.2 24.7 - 36.0 sec   COD (ADULT)   Result Value Ref Range    ABO Grouping Only A (A)     Rh Grouping Only POS     Antibody Screen-Cod NEG    TROPONIN   Result Value Ref Range    Troponin T 10 6 - 19  ng/L   ABO Rh Confirm   Result Value Ref Range    ABO Rh Confirm A POS (A)    URINALYSIS    Specimen: Urine   Result Value Ref Range    Color Yellow     Character Clear     Specific Gravity 1.033 <1.035    Ph 8.0 5.0 - 8.0    Glucose Negative Negative mg/dL    Ketones Negative Negative mg/dL    Protein Negative Negative mg/dL    Bilirubin Negative Negative    Urobilinogen, Urine 0.2 Negative    Nitrite Negative Negative    Leukocyte Esterase Negative Negative    Occult Blood Negative Negative    Micro Urine Req see below    TSH WITH REFLEX TO FT4   Result Value Ref Range    TSH 11.700 (H) 0.380 - 5.330 uIU/mL   VITAMIN B12   Result Value Ref Range    Vitamin B12 -True Cobalamin 418 211 - 911 pg/mL   AMMONIA   Result Value Ref Range    Ammonia <10 (L) 11 - 45 umol/L   URINE DRUG SCREEN   Result Value Ref Range    Amphetamines Urine Negative Negative    Barbiturates Negative Negative    Benzodiazepines Negative Negative    Cocaine Metabolite Negative Negative    Methadone Negative Negative    Opiates Negative Negative    Oxycodone Negative Negative    Phencyclidine -Pcp Negative Negative    Propoxyphene Negative Negative    Cannabinoid Metab Negative Negative   ESTIMATED GFR   Result Value Ref Range    GFR If African American >60 >60 mL/min/1.73 m 2    GFR If Non African American >60 >60 mL/min/1.73 m 2   FREE THYROXINE   Result Value Ref Range    Free T-4 0.85 (L) 0.93 - 1.70 ng/dL   Magnesium   Result Value Ref Range    Magnesium 1.9 1.5 - 2.5 mg/dL     All labs reviewed by me.    EKG  See above.    RADIOLOGY  DX-CHEST-PORTABLE (1 VIEW)   Final Result      No acute cardiopulmonary abnormality.      CT-HEAD WITH   Final Result      Head CT with contrast within normal limits. No evidence of acute cerebral infarction, hemorrhage, mass lesion, or abnormal enhancement.      CT-CTA NECK WITH & W/O-POST PROCESSING   Final Result      1.  Critical right ICA stenosis as described above.   2.  Approximate 30-40% left ICA  stenosis.   3.  Normal appearance of the cervical vertebral arteries.      CT-CTA HEAD WITH & W/O-POST PROCESS   Final Result      1.  Focal stenosis of the left internal carotid artery just below the skull base with approximately 50% resultant stenosis.   2.  Otherwise normal CT angiography of the head.      CT-CEREBRAL PERFUSION ANALYSIS   Final Result      1.  Cerebral blood flow less than 30% likely representing completed infarct = 0 mL.      2.  T Max more than 6 seconds likely representing combination of completed infarct and ischemia = 0 mL.      3.  Mismatched volume likely representing ischemic brain/penumbra = None      4.  Please note that the cerebral perfusion was performed on the limited brain tissue around the basal ganglia region. Infarct/ischemia outside the CT perfusion sections can be missed in this study.      CT-HEAD W/O   Final Result      Normal CT scan of the head without contrast.               INTERPRETING LOCATION:  [address] [place] [address], [facility]        The radiologist's interpretation of all radiological studies have been reviewed by me.    COURSE & MEDICAL DECISION MAKING  Pertinent Labs & Imaging studies reviewed. (See chart for details)    7:36 PM - Patient seen and examined at bedside. Ordered CT-Head w/ and w/o, DX-Chest 1 view, CT-Cerebral perfusion analysis, CT-CTEA head with and w/o post processing, CT-CTA neck with and w/o post, CBC with differential, CMP, Prothrombin Time, APTT, COD Adult, Troponin, EKG to evaluate his symptoms. The differential diagnoses include but are not limited to: TIA, CVA, arrhythmia, electrolyte abnormality, myocardial infarction.    7:40 PM - Patient sent to CT scanner    8:05 PM - Dr. Owusu, Neuro, present at bedside.    8:18 PM - Patient reassessed by myself and updated on the plan of care.    8:23 PM - Paged Vascular Surgery    8:26 PM - I spoke with Dr. Tarango, Vascular Surgery, who wants the patient made NPO after midnight, started on Plavix 75 mg,  admitted to the hospitalist service, and will see him in the morning.    8:31 PM - Paged Hospitalist    8:38 PM - I spoke with Dr. Finch, Hospitalist, who agrees to have the patient evaluated for hospitalization.    8:41 PM - Patient updated on plan of care regarding hospitalization, anticoagulant medication and likely plan for surgery. He understands and agrees.    Medical Decision Making: This point time patient looks to have had possibly a TIA given the severe carotid stenosis he will be evaluated weighted by vascular for a carotid endarterectomy.  Patient was started on Plavix as requested.  EKG does not show any signs of arrhythmia.  DISPOSITION:  Patient will be hospitalized by Dr. Finch, Hospitalist, in guarded condition.     FINAL IMPRESSION  1. TIA (transient ischemic attack)    2. Syncope, unspecified syncope type    3. Stenosis of right carotid artery    4. Hypertension, unspecified type          Moses ANSARI ([employer]), am scribing for, and in the presence of, Rashi Nielsen M.D.    Electronically signed by: Moses Tenorio ([employer]), 7/12/2020    IRashi M.D. personally performed the services described in this documentation, as scribed by Moses Tenorio in my presence, and it is both accurate and complete. C.    The note accurately reflects work and decisions made by me.  Rashi Nielsen M.D.  7/13/2020  2:06 AM

## 2020-07-14 ENCOUNTER — APPOINTMENT (OUTPATIENT)
Dept: RADIOLOGY | Facility: MEDICAL CENTER | Age: 75
DRG: 038 | End: 2020-07-14
Attending: NURSE PRACTITIONER
Payer: MEDICARE

## 2020-07-14 ENCOUNTER — PATIENT OUTREACH (OUTPATIENT)
Dept: HEALTH INFORMATION MANAGEMENT | Facility: OTHER | Age: 75
End: 2020-07-14

## 2020-07-14 VITALS
RESPIRATION RATE: 16 BRPM | OXYGEN SATURATION: 97 % | WEIGHT: 179.23 LBS | DIASTOLIC BLOOD PRESSURE: 88 MMHG | TEMPERATURE: 97.6 F | BODY MASS INDEX: 23 KG/M2 | HEIGHT: 74 IN | HEART RATE: 76 BPM | SYSTOLIC BLOOD PRESSURE: 152 MMHG

## 2020-07-14 LAB
ALBUMIN SERPL BCP-MCNC: 3.6 G/DL (ref 3.2–4.9)
ALBUMIN/GLOB SERPL: 1.4 G/DL
ALP SERPL-CCNC: 79 U/L (ref 30–99)
ALT SERPL-CCNC: 18 U/L (ref 2–50)
ANION GAP SERPL CALC-SCNC: 13 MMOL/L (ref 7–16)
AST SERPL-CCNC: 25 U/L (ref 12–45)
BILIRUB SERPL-MCNC: 0.6 MG/DL (ref 0.1–1.5)
BUN SERPL-MCNC: 14 MG/DL (ref 8–22)
CALCIUM SERPL-MCNC: 8.7 MG/DL (ref 8.5–10.5)
CHLORIDE SERPL-SCNC: 94 MMOL/L (ref 96–112)
CO2 SERPL-SCNC: 23 MMOL/L (ref 20–33)
CREAT SERPL-MCNC: 0.61 MG/DL (ref 0.5–1.4)
GLOBULIN SER CALC-MCNC: 2.5 G/DL (ref 1.9–3.5)
GLUCOSE SERPL-MCNC: 98 MG/DL (ref 65–99)
POTASSIUM SERPL-SCNC: 4 MMOL/L (ref 3.6–5.5)
PROT SERPL-MCNC: 6.1 G/DL (ref 6–8.2)
SODIUM SERPL-SCNC: 130 MMOL/L (ref 135–145)

## 2020-07-14 PROCEDURE — 97535 SELF CARE MNGMENT TRAINING: CPT

## 2020-07-14 PROCEDURE — A9270 NON-COVERED ITEM OR SERVICE: HCPCS | Performed by: STUDENT IN AN ORGANIZED HEALTH CARE EDUCATION/TRAINING PROGRAM

## 2020-07-14 PROCEDURE — 36415 COLL VENOUS BLD VENIPUNCTURE: CPT

## 2020-07-14 PROCEDURE — 80053 COMPREHEN METABOLIC PANEL: CPT

## 2020-07-14 PROCEDURE — 700102 HCHG RX REV CODE 250 W/ 637 OVERRIDE(OP): Performed by: STUDENT IN AN ORGANIZED HEALTH CARE EDUCATION/TRAINING PROGRAM

## 2020-07-14 PROCEDURE — 70551 MRI BRAIN STEM W/O DYE: CPT

## 2020-07-14 PROCEDURE — 99238 HOSP IP/OBS DSCHRG MGMT 30/<: CPT | Mod: GC | Performed by: HOSPITALIST

## 2020-07-14 RX ORDER — ASPIRIN 81 MG/1
81 TABLET, CHEWABLE ORAL DAILY
Qty: 30 TAB | Refills: 3 | Status: SHIPPED | OUTPATIENT
Start: 2020-07-15 | End: 2020-08-14

## 2020-07-14 RX ORDER — ATORVASTATIN CALCIUM 80 MG/1
80 TABLET, FILM COATED ORAL EVERY EVENING
Qty: 30 TAB | Refills: 3 | Status: SHIPPED | OUTPATIENT
Start: 2020-07-14 | End: 2020-08-13

## 2020-07-14 RX ORDER — LOSARTAN POTASSIUM 50 MG/1
25 TABLET ORAL DAILY
Qty: 15 TAB | Refills: 3 | Status: SHIPPED | OUTPATIENT
Start: 2020-07-14 | End: 2020-08-13

## 2020-07-14 RX ORDER — LEVOTHYROXINE SODIUM 0.03 MG/1
25 TABLET ORAL
Qty: 30 TAB | Refills: 3 | Status: SHIPPED | OUTPATIENT
Start: 2020-07-15 | End: 2020-08-14

## 2020-07-14 RX ADMIN — LEVOTHYROXINE SODIUM 25 MCG: 25 TABLET ORAL at 06:04

## 2020-07-14 RX ADMIN — ASPIRIN 81 MG 81 MG: 81 TABLET ORAL at 06:04

## 2020-07-14 ASSESSMENT — COGNITIVE AND FUNCTIONAL STATUS - GENERAL
SUGGESTED CMS G CODE MODIFIER DAILY ACTIVITY: CH
DAILY ACTIVITIY SCORE: 24

## 2020-07-14 ASSESSMENT — ENCOUNTER SYMPTOMS
DOUBLE VISION: 0
BLURRED VISION: 0
WEAKNESS: 0
SHORTNESS OF BREATH: 0
WEIGHT LOSS: 0
DIZZINESS: 0
HEADACHES: 0
FEVER: 0
SENSORY CHANGE: 0
PALPITATIONS: 0
CHILLS: 0

## 2020-07-14 NOTE — THERAPY
Physical Therapy Contact Note:       07/14/20 1390   Interdisciplinary Plan of Care Collaboration   IDT Collaboration with  Nursing;Occupational Therapist   Collaboration Comments New Physical Therapy order received. Therapist spoke with pt and RN regarding mobility. Pt has been up to bathroom without assist and appears to be at baseline after intervention. No PT needs at this time.        Kimberly Duke PT,DPT  Pager [phone]

## 2020-07-14 NOTE — ANESTHESIA POSTPROCEDURE EVALUATION
Patient: Marco Echeverria    Procedure Summary     Date:  07/13/20 Room / Location:  [facility] 06 / SURGERY [facility]    Anesthesia Start:  0947 Anesthesia Stop:  1140    Procedure:  ENDARTERECTOMY, CAROTID (Right Neck) Diagnosis:  (Right Carotid Artery Stenosis)    Surgeon:  Aly Tarango M.D. Responsible Provider:  Elan Guzman D.O.    Anesthesia Type:  general ASA Status:  3 - Emergent          Final Anesthesia Type: general  Last vitals  BP   Blood Pressure : 124/66, Arterial BP: 111/53    Temp   36.6 °C (97.8 °F)    Pulse   Pulse: 84   Resp   17    SpO2   96 %      Anesthesia Post Evaluation    Patient location during evaluation: PACU  Patient participation: complete - patient participated  Level of consciousness: awake and alert  Pain score: 0    Airway patency: patent  Anesthetic complications: no  Cardiovascular status: hemodynamically stable  Respiratory status: acceptable  Hydration status: euvolemic    PONV: none           Nurse Pain Score: 0 (NPRS)

## 2020-07-14 NOTE — ASSESSMENT & PLAN NOTE
On admission, sodium of 125.  Today, sodium jacqueline to 128 in the afternoon status post endarterectomy.  Patient is currently eating.  -Encourage p.o. intake

## 2020-07-14 NOTE — NON-PROVIDER
Daily Progress Note:     Date of Service: 7/14/2020  Primary Team: [facility] Orange Team   Attending: Dr. Jake De La O  Senior Resident: Dr. Handy Sharma  Intern: Dr. 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    24-Hour Events:  Overnight, no acute events.    Subjective:   Patient reports feeling great today. No headaches, hoarseness, weakness, chest pain, or SOB. Patient reports no complications from surgery known, and vascular surgeon cleared him for discharge.     Interval Events:  Right carotid endarterectomy 7/13 (70% stenosis)    Consultants/Specialty:  PT/OT - cleared to be discharged home    Review of Systems:    Review of Systems   Constitutional: Negative for chills, fever, malaise/fatigue and weight loss.   HENT: Positive for hearing loss.    Eyes: Negative for blurred vision and double vision.   Respiratory: Negative for shortness of breath.    Cardiovascular: Negative for chest pain and palpitations.   Neurological: Negative for dizziness, sensory change, weakness and headaches.       Objective Data:   Physical Exam:   Vitals:   Temp:  [35.8 °C (96.5 °F)-36.8 °C (98.2 °F)] 36.4 °C (97.5 °F)  Pulse:  [71-90] 71  Resp:  [12-20] 16  BP: (106-176)/(50-96) 128/75  SpO2:  [91 %-100 %] 95 %    Physical Exam  Constitutional:       General: He is not in acute distress.     Appearance: Normal appearance.   HENT:      Head: Normocephalic and atraumatic.      Mouth/Throat:      Mouth: Mucous membranes are moist.   Eyes:      General: No scleral icterus.     Extraocular Movements: Extraocular movements intact.      Conjunctiva/sclera: Conjunctivae normal.   Cardiovascular:      Rate and Rhythm: Normal rate and regular rhythm.      Pulses: Normal pulses.      Heart sounds: No murmur.    Pulmonary:      Effort: Pulmonary effort is normal.      Breath sounds: Normal breath sounds. No wheezing.   Neurological:      Mental Status: He is alert and oriented to person, place, and time.      Cranial Nerves: Cranial nerve deficit present.      Sensory: No sensory deficit.      Motor: No weakness.      Comments: Unable to raise eyebrow (CN VII)   Psychiatric:         Mood and Affect: Mood normal.         Behavior: Behavior normal.       Labs:   Angelica - 80  UCr - 37    HbA1c 5.8%      Imaging:   ECHO - LVEF 75%, no right to left shunt  MRI brain - no acute infarct, mild chronic microvascular ischemic disease    Assessment and Plan:  72 yo M w/ hx of HTN (on Losartan), R parotid tumor (resection 20+ years ago, residual upper left face , throat cancer (tonsil neoplasm, 2011, s/p chemo/radiation) who presents with left-sided facial droop, left-sided extremity weakness, and loss of consciousness.     # Left-sided weakness  Transient left-sided weakness and left facial droop consistent with critical R ICA stenosis found on CTA head and neck. Brain MRI did not show acute infarct. TTE with bubble study did not show right to left shunt. Telemetry has been in sinus rhythm. Episode of left-sided weakness and headache likely 2/2 TIA. Right carotid endarterectomy performed on 7/13. No complications noted. Patient is doing well today with no weakness or new symptoms.   - Continue aspirin 81 mg  - Continue atorvastatin 80 mg daily     # Hyponatremia (resolving)  Acute vs. chronic moderate hyponatremia. On admission 7/12, Na 126 (last known Na 131 on 7/12 1200). AO x4. Dizziness, headache, unsteadiness could be 2/2 hyponatremia. Patient given 500 cc NS bolus, sodium worsened from 126 to 125 indicating that patient is less likely to have hypovolemic hyponatremia. Euvolemic hyponatremia likely 2/2 ADH secretion which may be seen 2/2 hypothyroidism or SIADH from CNS disease (stroke). Urine sodium high, which supports  increased ADH secretion. Today, serum sodium 130.   - Goal sodium correction <6-8 mEq in 24-hour period  - Treat underlying cause (hypothyroidism)  - Follow-up with PCP outpatient. Repeat BMP in follow-up visit 1-2 weeks.      # Hypothyroidism  Likely primary hypothyroidism. Elevated TSH 11.7, low free T4 0.85. No hx. Patient denies symptoms of hypothyroidism including fatigue, weight gain, constipation, and dry skin. May be 2/2 recent illness, prior radiation therapy to neck area, or chronic autoimmune hypothyroidism.   - Continue levothyroxine 25 mcg daily  - Follow-up with PCP outpatient. Repeat TSH/free T4 in 6-8 weeks.      # Hypertension  Chronic essential HTN. On Losartan 50 mg for at least three years. Patient reports some pre-syncope episodes when first started on BP medications until dosing was changed. He still describes episodes of lightheadedness especially when getting up that is suspicious for orthostatic hypotension. BP during hospitalization initially ranged from 100-120/50-70, but has trended up this afternoon to around 150/80. Will resume home medication to meet SBP <140, but at reduced dose to minimize pre-syncope side effects.   - Resume home med Losartan but at lower dose 25 mg daily.   - Follow-up with PCP outpatient. Discuss presyncope symptoms and losartan dosage.

## 2020-07-14 NOTE — THERAPY
Occupational Therapy   Initial Evaluation     Patient Name: Marco Echeverria  Age:  75 y.o., Sex:  male  Medical Record #: [id number]  Today's Date: 7/14/2020          Assessment  Patient is 75 y.o. male with a diagnosis of TIA, s/p CEA.  Additional factors influencing patient status / progress: all symptoms resolved .      Plan    Recommend Occupational Therapy screen only, no skilled Ot needs for the following treatments:  NA.    Discharge recommendations:  Anticipate that the patient will have no further occupational therapy needs after discharge from the hospital.     Subjective    Screen only, no skilled need for therapy/ change in functional status noted after procedure. Reviewed safety, needs for home, precautions. Full OT re-eval not needed at his time     Objective       07/14/20 0840   Total Time Spent   Total Time Spent (Mins) 10   Charge Group   OT Self Care / ADL 1   Initial Contact Note    Initial Contact Note Order Received and Verified. Occupational Therapy Evaluation NOT Completed Because Patient Does Not Require Acute Occupational Therapy at this Time.   How much help from another person does the patient currently need...   Putting on and taking off regular lower body clothing? 4   Bathing (including washing, rinsing, and drying)? 4   Toileting, which includes using a toilet, bedpan, or urinal? 4   Putting on and taking off regular upper body clothing? 4   Taking care of personal grooming such as brushing teeth? 4   Eating meals? 4   6 Clicks Daily Activity Score 24   Education Group   Role of Occupational Therapist Patient Response Patient;Acceptance;Explanation;Demonstration;Verbal Demonstration   Home Safety Patient Response Patient;Acceptance;Explanation;Demonstration;Verbal Demonstration;Action Demonstration   Anticipated Discharge Equipment   DC Equipment None   Interdisciplinary Plan of Care Collaboration   IDT Collaboration with  Nursing;Physical Therapist   Patient Position at End of  Therapy In Bed   Collaboration Comments new therapy eval order received, s/p CEA. symptoms remained resolved. reviewed home safety, ADL, precautions. no new eval warranted at this time. screen only   Session Information   Date / Session Number  7/14- screen only.

## 2020-07-14 NOTE — ASSESSMENT & PLAN NOTE
Etiology unknown at this time.  -Plan to further investigate the nature of hypertension.  -Keep blood pressure below 160 systolic.  Hydralazine, clonidine and labetalol as needed are in place.

## 2020-07-14 NOTE — ASSESSMENT & PLAN NOTE
CTA neck with and without contrast consistent with critical right ICA stenosis, 30 to 40% left ICA stenosis.  Patient underwent endarterectomy of the right internal carotid artery.  -Vascular surgery is following  -aspirin 81 mg   -Atorvastatin 80 mg  - BG<140 -180. Please refer to neurology note (Sammy Easley 7/13)   -MR-brain without contrast to assess injury to parenchyma

## 2020-07-14 NOTE — PROGRESS NOTES
Awake and alert  No complaints  Neurologically intact    Incision clean dry and intact  Minimal HECTOR output    Status post right carotid endarterectomy    DC HECTOR drain  Okay to discharged home on 81 mg aspirin daily  Follow-up in my office 1 week    Aly Tarango M.D.

## 2020-07-14 NOTE — PROGRESS NOTES
Patient discharged home by the Physician.  Discharge instructions reviewed with patient including when to follow up with PCP and general surgery, new medications, and signs/symptoms to watch for.  Patient verbalized understanding.  PIV removed.  Tele box removed; monitor room notified. Patient transferred to [facility] via wheelchair for discharge.

## 2020-07-14 NOTE — PROGRESS NOTES
Received report from RN in  recovery room , pt assisted back to hospital room via hospital bed on tele.  Pt A&O X 4  Pt denies pn or nausea at this time, VSS see flow sheet, pt on post procedure VS, SaO2- 95% on 2L, call bell in reach.

## 2020-07-14 NOTE — PROGRESS NOTES
Daily Progress Note:     Date of Service: 7/13/2020  Primary Team: [facility] Team   Attending: Jake De La O M.D.   Senior Resident: Dr. BANG Rivera M.D  Intern: Dr. LISA Bach  Contact:  [phone]    Chief Complaint:   Ground-level fall and left-sided weakness     Subjective  Mr. Echeverria is a 75-year-old man with history of hypertension and throat cancer diagnosed in 2011 status post chemo/radiation who presented to the ED with left-sided facial droop and left-sided extremity weakness, admitted for acute ischemic stroke.  He was evaluated by neurology, but due to therapeutic window was not a candidate for TPA.  CTA head and neck revealed right ICA occlusion/critical stenosis    Consultants/Specialty:  Neurology  Vascular surgery  Review of Systems:  Review of Systems   Unable to perform ROS: Other   ROS was not performed; patient was in the OR for endarterectomy    Objective Data:   Physical Exam:   Vitals:   Temp:  [35.8 °C (96.5 °F)-36.9 °C (98.5 °F)] 36.6 °C (97.8 °F)  Pulse:  [] 84  Resp:  [12-20] 17  BP: (106-207)/() 124/66  SpO2:  [85 %-100 %] 96 %    Physical Exam  Physical exam was deferred; patient was in the OR for endarterectomy    Labs:   Results for orders placed or performed during the hospital encounter of 07/12/20   CBC WITH DIFFERENTIAL   Result Value Ref Range    WBC 6.7 4.8 - 10.8 K/uL    RBC 5.14 4.70 - 6.10 M/uL    Hemoglobin 16.0 14.0 - 18.0 g/dL    Hematocrit 46.5 42.0 - 52.0 %    MCV 90.5 81.4 - 97.8 fL    MCH 31.1 27.0 - 33.0 pg    MCHC 34.4 33.7 - 35.3 g/dL    RDW 40.9 35.9 - 50.0 fL    Platelet Count 188 164 - 446 K/uL    MPV 9.0 9.0 - 12.9 fL    Neutrophils-Polys 75.90 (H) 44.00 - 72.00 %    Lymphocytes 11.30 (L) 22.00 - 41.00 %    Monocytes 11.20 0.00 - 13.40 %    Eosinophils 0.90 0.00 - 6.90 %    Basophils 0.40 0.00 - 1.80 %    Immature Granulocytes 0.30 0.00 - 0.90 %    Nucleated RBC 0.00 /100 WBC    Neutrophils (Absolute) 5.09 1.82 - 7.42 K/uL    Lymphs (Absolute)  0.76 (L) 1.00 - 4.80 K/uL    Monos (Absolute) 0.75 0.00 - 0.85 K/uL    Eos (Absolute) 0.06 0.00 - 0.51 K/uL    Baso (Absolute) 0.03 0.00 - 0.12 K/uL    Immature Granulocytes (abs) 0.02 0.00 - 0.11 K/uL    NRBC (Absolute) 0.00 K/uL   COMP METABOLIC PANEL   Result Value Ref Range    Sodium 126 (L) 135 - 145 mmol/L    Potassium 4.2 3.6 - 5.5 mmol/L    Chloride 88 (L) 96 - 112 mmol/L    Co2 20 20 - 33 mmol/L    Anion Gap 18.0 (H) 7.0 - 16.0    Glucose 151 (H) 65 - 99 mg/dL    Bun 8 8 - 22 mg/dL    Creatinine 0.83 0.50 - 1.40 mg/dL    Calcium 9.2 8.5 - 10.5 mg/dL    AST(SGOT) 23 12 - 45 U/L    ALT(SGPT) 21 2 - 50 U/L    Alkaline Phosphatase 107 (H) 30 - 99 U/L    Total Bilirubin 0.5 0.1 - 1.5 mg/dL    Albumin 4.5 3.2 - 4.9 g/dL    Total Protein 7.0 6.0 - 8.2 g/dL    Globulin 2.5 1.9 - 3.5 g/dL    A-G Ratio 1.8 g/dL   PROTHROMBIN TIME   Result Value Ref Range    PT 12.6 12.0 - 14.6 sec    INR 0.91 0.87 - 1.13   APTT   Result Value Ref Range    APTT 25.2 24.7 - 36.0 sec   COD (ADULT)   Result Value Ref Range    ABO Grouping Only A (A)     Rh Grouping Only POS     Antibody Screen-Cod NEG    TROPONIN   Result Value Ref Range    Troponin T 10 6 - 19 ng/L   ABO Rh Confirm   Result Value Ref Range    ABO Rh Confirm A POS (A)    URINALYSIS    Specimen: Urine   Result Value Ref Range    Color Yellow     Character Clear     Specific Gravity 1.033 <1.035    Ph 8.0 5.0 - 8.0    Glucose Negative Negative mg/dL    Ketones Negative Negative mg/dL    Protein Negative Negative mg/dL    Bilirubin Negative Negative    Urobilinogen, Urine 0.2 Negative    Nitrite Negative Negative    Leukocyte Esterase Negative Negative    Occult Blood Negative Negative    Micro Urine Req see below    TSH WITH REFLEX TO FT4   Result Value Ref Range    TSH 11.700 (H) 0.380 - 5.330 uIU/mL   VITAMIN B12   Result Value Ref Range    Vitamin B12 -True Cobalamin 418 211 - 911 pg/mL   AMMONIA   Result Value Ref Range    Ammonia <10 (L) 11 - 45 umol/L   URINE DRUG  SCREEN   Result Value Ref Range    Amphetamines Urine Negative Negative    Barbiturates Negative Negative    Benzodiazepines Negative Negative    Cocaine Metabolite Negative Negative    Methadone Negative Negative    Opiates Negative Negative    Oxycodone Negative Negative    Phencyclidine -Pcp Negative Negative    Propoxyphene Negative Negative    Cannabinoid Metab Negative Negative   ESTIMATED GFR   Result Value Ref Range    GFR If African American >60 >60 mL/min/1.73 m 2    GFR If Non African American >60 >60 mL/min/1.73 m 2   FREE THYROXINE   Result Value Ref Range    Free T-4 0.85 (L) 0.93 - 1.70 ng/dL   Magnesium   Result Value Ref Range    Magnesium 1.9 1.5 - 2.5 mg/dL   CBC without Differential   Result Value Ref Range    WBC 4.4 (L) 4.8 - 10.8 K/uL    RBC 5.27 4.70 - 6.10 M/uL    Hemoglobin 16.5 14.0 - 18.0 g/dL    Hematocrit 48.3 42.0 - 52.0 %    MCV 91.7 81.4 - 97.8 fL    MCH 31.3 27.0 - 33.0 pg    MCHC 34.2 33.7 - 35.3 g/dL    RDW 42.6 35.9 - 50.0 fL    Platelet Count 148 (L) 164 - 446 K/uL    MPV 8.9 (L) 9.0 - 12.9 fL   Comp Metabolic Panel (CMP)   Result Value Ref Range    Sodium 128 (L) 135 - 145 mmol/L    Potassium 5.0 3.6 - 5.5 mmol/L    Chloride 92 (L) 96 - 112 mmol/L    Co2 22 20 - 33 mmol/L    Anion Gap 14.0 7.0 - 16.0    Glucose 106 (H) 65 - 99 mg/dL    Bun 8 8 - 22 mg/dL    Creatinine 0.66 0.50 - 1.40 mg/dL    Calcium 8.7 8.5 - 10.5 mg/dL    AST(SGOT) 18 12 - 45 U/L    ALT(SGPT) 20 2 - 50 U/L    Alkaline Phosphatase 88 30 - 99 U/L    Total Bilirubin 0.5 0.1 - 1.5 mg/dL    Albumin 4.0 3.2 - 4.9 g/dL    Total Protein 6.1 6.0 - 8.2 g/dL    Globulin 2.1 1.9 - 3.5 g/dL    A-G Ratio 1.9 g/dL   Lipid Profile   Result Value Ref Range    Cholesterol,Tot 165 100 - 199 mg/dL    Triglycerides 62 0 - 149 mg/dL    HDL 53 >=40 mg/dL     (H) <100 mg/dL   HEMOGLOBIN A1C   Result Value Ref Range    Glycohemoglobin 5.8 (H) 0.0 - 5.6 %    Est Avg Glucose 120 mg/dL   Urine Sodium Random   Result Value Ref  Range    Sodium, Urine -per volume 80 mmol/L   Urine Creatinine Random   Result Value Ref Range    Creatinine, Random Urine 37.08 mg/dL   SODIUM SERUM (NA)   Result Value Ref Range    Sodium 125 (L) 135 - 145 mmol/L   COVID/SARS CoV-2 PCR    Specimen: Nasal; Respirate   Result Value Ref Range    COVID Order Status Received    SARS-CoV-2, PCR (In-House)   Result Value Ref Range    SARS-CoV-2 Source Nasal Swab     SARS-CoV-2 (RdRp gene) NotDetected    Histology Request   Result Value Ref Range    Pathology Request Sent to [facility]    ESTIMATED GFR   Result Value Ref Range    GFR If African American >60 >60 mL/min/1.73 m 2    GFR If Non African American >60 >60 mL/min/1.73 m 2   EKG   Result Value Ref Range    Report       [facility]    Test Date:  2020  Pt Name:    CLAU RIDDLE             Department: 183  MRN:        [id number]                      Room:       [facility]  Gender:     Male                         Technician: [facility]  :        [id number]                   Requested By:OFELIA GUERRIER  Order #:    [id number]                    Reading MD: Viri Marin    Measurements  Intervals                                Axis  Rate:       74                           P:          71  ME:         172                          QRS:        -22  QRSD:       96                           T:          33  QT:         392  QTc:        435    Interpretive Statements  SINUS RHYTHM  Electronically Signed On 2020 7:16:58 PDT by Viri Marin         Imaging:   CT head without contrast: Unremarkable.  CT-cerebral perfusion analysis: Unremarkable  CTA head with + without contrast: Focal stenosis of left internal carotid artery just below the skull base with 50% resultant stenosis.  CTA neck with + without contrast: Critical right ICA stenosis, 30 to 40% left ICA stenosis.  CT head with contrast: Unremarkable  Chest x-ray: Unremarkable    * Acute ischemic stroke (HCC) 2/2 to bilateral ICA stenosis  Assessment & Plan  CTA  neck with and without contrast consistent with critical right ICA stenosis, 30 to 40% left ICA stenosis.  Patient underwent endarterectomy of the right internal carotid artery.  -Vascular surgery is following  -aspirin 81 mg   -Atorvastatin 80 mg  - BG<140 -180. Please refer to neurology note (Sammy Easley 7/13)   -MR-brain without contrast to assess injury to parenchyma        Hyperlipidemia- (present on admission)  Assessment & Plan  Etiology unclear at this time.  -Atorvastatin 80 mg daily    Hyponatremia  Assessment & Plan  On admission, sodium of 125.  Today, sodium jacqueline to 128 in the afternoon status post endarterectomy.  Patient is currently eating.  -Encourage p.o. intake      Hypothyroidism  Assessment & Plan  TSH levels on admission 11.7  - Levothyroxine 25 mg QAM    HTN (hypertension)- (present on admission)  Assessment & Plan  Etiology unknown at this time.  -Plan to further investigate the nature of hypertension.  -Keep blood pressure below 160 systolic.  Hydralazine, clonidine and labetalol as needed are in place.    Diet: Full diet  Pain: Oxycodone 5 mg every 6 hours  Bowel regimen: Per protocol  PT/OT: Consult entered  DVT prophylaxis: SCDs  CODE STATUS full

## 2020-07-14 NOTE — DISCHARGE INSTRUCTIONS
Discharge Instructions    Discharged to home by car with relative. Discharged via wheelchair, hospital escort: Yes.  Special equipment needed: Not Applicable    Be sure to schedule a follow-up appointment with your primary care doctor or any specialists as instructed.     Discharge Plan:   Diet Plan: Discussed  Activity Level: Discussed  Confirmed Follow up Appointment: Patient to Call and Schedule Appointment  Confirmed Symptoms Management: Discussed  Medication Reconciliation Updated: Yes    I understand that a diet low in cholesterol, fat, and sodium is recommended for good health. Unless I have been given specific instructions below for another diet, I accept this instruction as my diet prescription.   Other diet: Resume home diet    Special Instructions: None    · Is patient discharged on Warfarin / Coumadin?   No     Depression / Suicide Risk    As you are discharged from this [facility] facility, it is important to learn how to keep safe from harming yourself.    Recognize the warning signs:  · Abrupt changes in personality, positive or negative- including increase in energy   · Giving away possessions  · Change in eating patterns- significant weight changes-  positive or negative  · Change in sleeping patterns- unable to sleep or sleeping all the time   · Unwillingness or inability to communicate  · Depression  · Unusual sadness, discouragement and loneliness  · Talk of wanting to die  · Neglect of personal appearance   · Rebelliousness- reckless behavior  · Withdrawal from people/activities they love  · Confusion- inability to concentrate     If you or a loved one observes any of these behaviors or has concerns about self-harm, here's what you can do:  · Talk about it- your feelings and reasons for harming yourself  · Remove any means that you might use to hurt yourself (examples: pills, rope, extension cords, firearm)  · Get professional help from the community (Mental Health, Substance Abuse,  "psychological counseling)  · Do not be alone:Call your Safe Contact- someone whom you trust who will be there for you.  · Call your local CRISIS HOTLINE 295-2890 or 237-397-3717  · Call your local Children's Mobile Crisis Response Team [place] (222) 457-8845 or www.Degreed  · Call the toll free National Suicide Prevention Hotlines   · National Suicide Prevention Lifeline 854-539-DJEZ (1376)  · Childcare Bridge Line Network 800-SUICIDE (190-0982)    Transient Ischemic Attack    A transient ischemic attack (TIA) is a \"warning stroke\" that causes stroke-like symptoms that go away quickly. A TIA does not cause lasting damage to the brain. But having a TIA is a sign that you may be at risk for a stroke. Lifestyle changes and medical treatments can help prevent a stroke.  It is important to know the symptoms of a TIA and what to do. Get help right away, even if your symptoms go away. The symptoms of a TIA are the same as those of a stroke. They can happen fast, and they usually go away within minutes or hours. They can include:  · Weakness or loss of feeling in your face, arm, or leg. This often happens on one side of your body.  · Trouble walking.  · Trouble moving your arms or legs.  · Trouble talking or understanding what people are saying.  · Trouble seeing.  · Seeing two of one object (double vision).  · Feeling dizzy.  · Feeling confused.  · Loss of balance or coordination.  · Feeling sick to your stomach (nauseous) and throwing up (vomiting).  · A very bad headache for no reason.  What increases the risk?  Certain things may make you more likely to have a TIA. Some of these are things that you can change, such as:  · Being very overweight (obese).  · Using products that contain nicotine or tobacco, such as cigarettes and e-cigarettes.  · Taking birth control pills.  · Not being active.  · Drinking too much alcohol.  · Using drugs.  Other risk factors include:  · Having an irregular heartbeat (atrial " fibrillation).  · Being  or .  · Having had blood clots, stroke, TIA, or heart attack in the past.  · Being a woman with a history of high blood pressure in pregnancy (preeclampsia).  · Being over the age of 60.  · Being male.  · Having family history of stroke.  · Having the following diseases or conditions:  ? High blood pressure.  ? High cholesterol.  ? Diabetes.  ? Heart disease.  ? Sickle cell disease.  ? Sleep apnea.  ? Migraine headache.  ? Long-term (chronic) diseases that cause soreness and swelling (inflammation).  ? Disorders that affect how your blood clots.  Follow these instructions at home:  Medicines    · Take over-the-counter and prescription medicines only as told by your doctor.  · If you were told to take aspirin or another medicine to thin your blood, take it exactly as told by your doctor.  ? Taking too much of the medicine can cause bleeding.  ? Taking too little of the medicine may not work to treat the problem.  Eating and drinking    · Eat 5 or more servings of fruits and vegetables each day.  · Follow instructions from your doctor about your diet. You may need to follow a certain diet to help lower your risk of having a stroke. You may need to:  ? Eat a diet that is low in fat and salt.  ? Eat foods that contain a lot of fiber.  ? Limit the amount of carbohydrates and sugar in your diet.  · Limit alcohol intake to 1 drink a day for nonpregnant women and 2 drinks a day for men. One drink equals 12 oz of beer, 5 oz of wine, or 1½ oz of hard liquor.  General instructions  · Keep a healthy weight.  · Stay active. Try to get at least 30 minutes of activity on all or most days.  · Find out if you have a condition called sleep apnea. Get treatment if needed.  · Do not use any products that contain nicotine or tobacco, such as cigarettes and e-cigarettes. If you need help quitting, ask your doctor.  · Do not abuse drugs.  · Keep all follow-up visits as told by your doctor.  "This is important.  Get help right away if:  · You have any signs of stroke. \"BE FAST\" is an easy way to remember the main warning signs:  ? B - Balance. Signs are dizziness, sudden trouble walking, or loss of balance.  ? E - Eyes. Signs are trouble seeing or a sudden change in how you see.  ? F - Face. Signs are sudden weakness or loss of feeling of the face, or the face or eyelid drooping on one side.  ? A - Arms. Signs are weakness or loss of feeling in an arm. This happens suddenly and usually on one side of the body.  ? S - Speech. Signs are sudden trouble speaking, slurred speech, or trouble understanding what people say.  ? T - Time. Time to call emergency services. Write down what time symptoms started.  · You have other signs of stroke, such as:  ? A sudden, very bad headache with no known cause.  ? Feeling sick to your stomach (nausea).  ? Throwing up (vomiting).  ? Jerky movements that you cannot control (seizure).  These symptoms may be an emergency. Do not wait to see if the symptoms will go away. Get medical help right away. Call your local emergency services (911 in the [place] Do not drive yourself to the hospital.  Summary  · A transient ischemic attack (TIA) is a \"warning stroke\" that causes stroke-like symptoms that go away quickly.  · A TIA is a medical emergency. Get help right away, even if your symptoms go away.  · A TIA does not cause lasting damage to the brain.  · Having a TIA is a sign that you may be at risk for a stroke. Lifestyle changes and medical treatments can help prevent a stroke.  This information is not intended to replace advice given to you by your health care provider. Make sure you discuss any questions you have with your health care provider.  Document Released: 09/26/2009 Document Revised: 09/13/2019 Document Reviewed: 03/21/2018  Elsevier Patient Education © 2020 [employer]        Carotid Endarterectomy  A carotid endarterectomy is a surgery to remove a blockage in the " carotid arteries. The carotid arteries are the large blood vessels on both sides of the neck that supply blood to the brain. Carotid artery disease, also called carotid artery stenosis, is the narrowing or blockage of one or both carotid arteries. Carotid artery disease is usually caused by atherosclerosis, which is a buildup of fat and plaque in the arteries. Some buildup of plaque normally occurs with aging. The plaque may partially or totally block blood flow or cause a clot to form in the carotid arteries. This may cause a stroke.  Tell a health care provider about:  · Any allergies you have.  · All medicines you are taking, including vitamins, herbs, eye drops, creams, and over-the-counter medicines.  · Any problems you or family members have had with anesthetic medicines.  · Any blood disorders you have.  · Any surgeries you have had.  · Any medical conditions you have, or have had, including diabetes, kidney problems, and infections.  · Whether you are pregnant or may be pregnant.  What are the risks?  Generally, this is a safe procedure. However, problems may occur, including:  · Infection.  · Bleeding.  · Blood clots.  · Allergic reactions to medicines.  · Damage to nerves near the carotid arteries. This can cause a hoarse voice or weakness of muscles in your face.  · Stroke.  · Seizures.  · Heart attack (myocardial infarction).  · Narrowing of the opened blood vessel (restenosis). This may require another surgery.  What happens before the procedure?  Staying hydrated  Follow instructions from your health care provider about hydration, which may include:  · Up to 2 hours before the procedure - you may continue to drink clear liquids, such as water, clear fruit juice, black coffee, and plain tea.    Eating and drinking restrictions  Follow instructions from your health care provider about eating and drinking, which may include:  · 8 hours before the procedure - stop eating heavy meals or foods, such as meat,  fried foods, or fatty foods.  · 6 hours before the procedure - stop eating light meals or foods, such as toast or cereal.  · 6 hours before the procedure - stop drinking milk or drinks that contain milk.  · 2 hours before the procedure - stop drinking clear liquids.  Medicines  · Ask your health care provider about:  ? Changing or stopping your regular medicines. This is especially important if you are taking diabetes medicines or blood thinners.  ? Taking medicines such as aspirin and ibuprofen. These medicines can thin your blood. Do not take these medicines unless your health care provider tells you to take them.  ? Taking over-the-counter medicines, vitamins, herbs, and supplements.  General instructions  · Do not use any products that contain nicotine or tobacco for at least 4-6 weeks, or as soon as possible, before the procedure. These products include cigarettes, e-cigarettes, and chewing tobacco. If you need help quitting, ask your health care provider.  · You may need to have blood tests, a test to check heart rhythm (electrocardiogram), or a test to check blood flow (angiogram).  · Plan to have someone take you home from the hospital or clinic.  · Ask your health care provider:  ? How your surgery site will be marked.  ? What steps will be taken to help prevent infection. These may include:  § Removing hair at the surgery site.  § Washing skin with a germ-killing soap.  § Taking antibiotic medicine.  What happens during the procedure?    · An IV will be inserted into one of your veins.  · You will be given one or more of the following:  ? A medicine to help you relax (sedative).  ? A medicine to make you fall asleep (general anesthetic).  · The surgeon will make a small incision in your neck to expose the carotid artery.  · A tube may be inserted into the carotid artery above and below the blockage. This tube will allow blood to flow around the blockage during the surgery.  · An incision will be made in  the carotid artery at the location of the blockage.  · The blockage will be removed. In some cases, a section of the carotid artery may be removed and a graft patch may be used to repair the artery.  · The carotid artery will be closed with stitches (sutures).  · If a tube was inserted into the artery to allow blood flow around the blockage during surgery, the tube will be removed. Once the tube is removed, blood flow through the carotid artery will be restored.  · The incision in the neck will be closed with sutures.  · A bandage (dressing) will be placed over your incision.  The procedure may vary among health care providers and hospitals.  What happens after the procedure?  · Your blood pressure, heart rate, breathing rate, and blood oxygen level will be monitored until you leave the hospital or clinic.  · You may have some pain or an ache in your neck for about 2 weeks. This is normal.  · Do not drive for 24 hours if you were given a sedative during your procedure.  Summary  · A carotid endarterectomy is a surgery to remove a blockage in the carotid arteries.  · The carotid arteries are the large blood vessels on both sides of the neck that supply blood to the brain.  · Before the procedure, ask your health care provider about changing or stopping your regular medicines.  · Follow instructions from your health care provider about eating and drinking before the procedure.  · After the procedure, do not drive for 24 hours if you were given a sedative.  This information is not intended to replace advice given to you by your health care provider. Make sure you discuss any questions you have with your health care provider.  Document Released: 08/20/2014 Document Revised: 08/27/2019 Document Reviewed: 08/27/2019  Elsevier Patient Education © 2020 [employer] Inc[employer]        Carotid Endarterectomy, Care After  This sheet gives you information about how to care for yourself after your procedure. Your health care provider may  also give you more specific instructions. If you have problems or questions, contact your health care provider.  What can I expect after the procedure?  After the procedure, it is common to have some pain or an ache in your neck for up to 2 weeks. This is normal.  Follow these instructions at home:  Medicines  · Take over-the-counter and prescription medicines only as told by your health care provider.  · If you are given a blood thinner (anticoagulant) after surgery, take it exactly as told.  · Do not drive for 24 hours if you were given a sedative during your procedure.  · Do not drive or use heavy machinery while taking prescription pain medicine.  Incision care    · Follow instructions from your health care provider about how to take care of your incision. Make sure you:  ? Wash your hands with soap and water before and after you change your bandage (dressing). If soap and water are not available, use hand .  ? Change your dressing as told by your health care provider.  ? Leave stitches (sutures), skin glue, or adhesive strips in place. These skin closures may need to stay in place for 2 weeks or longer. If adhesive strip edges start to loosen and curl up, you may trim the loose edges. Do not remove adhesive strips completely unless your health care provider tells you to do that.  · Check your incision area every day for signs of infection. Check for:  ? Redness, swelling, or pain.  ? Fluid or blood.  ? Warmth.  ? Pus or a bad smell.  · Do not take baths, swim, or use a hot tub until your health care provider approves. Ask your health care provider if you may take showers. You may only be allowed to take sponge baths.  Activity  · Do not lift anything that is heavier than 10 lb (4.5 kg), or the limit that you are told, until your health care provider says that it is safe.  · Return to your normal activities as told by your health care provider. Ask your health care provider what activities are safe for  you.  ? Recovery time varies depending on your age, general health, and other factors. You will likely be able to return to a normal lifestyle within a few weeks. While you recover, you may need help with some activities, such as cleaning the house or shopping.  · Exercise regularly, or as told by your health care provider.  Eating and drinking    · Follow instructions from your health care provider about eating or drinking restrictions.  · Drink enough fluid to keep your urine pale yellow.  · Eat a heart-healthy diet. This includes foods like fresh fruits and vegetables, whole grains, low-fat dairy products, and low-fat (lean) meats. Avoid foods that are:  ? High in salt, saturated fat, or sugar.  ? Canned or highly processed.  ? Fried.  Lifestyle  · If you drink alcohol:  ? Limit how much you use to:  § 0-1 drink a day for women.  § 0-2 drinks a day for men.  ? Be aware of how much alcohol is in your drink. In the [place] one drink equals one 12 oz bottle of beer (355 mL), one 5 oz glass of wine (148 mL), or one 1½ oz glass of hard liquor (44 mL).  · Maintain a healthy weight.  General instructions  · Avoid wearing tight clothing around your neck and your sutures.  · Work with your health care provider to keep your blood pressure under control.  · Do not use any products that contain nicotine or tobacco, such as cigarettes, e-cigarettes, and chewing tobacco. If you need help quitting, ask your health care provider.  · Keep all follow-up visits as told by your health care provider. This is important.  Contact a health care provider if you have:  · Signs of infection, such as:  ? Redness, swelling, or pain around your incision.  ? Fluid or blood coming from your incision.  ? Your incision feeling warm to the touch.  ? Pus or a bad smell coming from your incision.  ? A fever.  · A rash.  · Difficulty speaking or you have changes in your voice.  Get help right away if you have:  · Difficulty breathing.  · Chest pain or  "shortness of breath.  · Any symptoms of a stroke. Certain factors may put you at risk for a stroke even after this procedure. These may include chronic lung disease, chronic kidney disease, narrowed arteries (peripheral arterial disease), previous history of a stroke, and being 60 years of age or older. \"BE FAST\" is an easy way to remember the main warning signs of a stroke:  ? B - Balance. Signs are dizziness, sudden trouble walking, or loss of balance.  ? E - Eyes. Signs are trouble seeing or a sudden change in vision.  ? F - Face. Signs are sudden weakness or numbness of the face, or the face or eyelid drooping on one side.  ? A - Arms. Signs are weakness or numbness in an arm. This happens suddenly and usually on one side of the body.  ? S - Speech. Signs are sudden trouble speaking, slurred speech, or trouble understanding what people say.  ? T - Time. Time to call emergency services. Write down what time symptoms started.  · Other signs of a stroke, such as:  ? A sudden, severe headache with no known cause.  ? Nausea or vomiting.  ? Seizure.  These symptoms may represent a serious problem that is an emergency. Do not wait to see if the symptoms will go away. Get medical help right away. Call your local emergency services (911 in the [place] Do not drive yourself to the hospital.  Summary  · After the procedure, it is common to have some pain or an ache in your neck for up to 2 weeks.  · Follow instructions from your health care provider about how to take care of your incision.  · Take over-the-counter and prescription medicines only as told by your health care provider.  · Do not use any products that contain nicotine or tobacco, such as cigarettes, e-cigarettes, and chewing tobacco. If you need help quitting, ask your health care provider.  · Keep all follow-up visits as told by your health care provider. This is important.  This information is not intended to replace advice given to you by your health care " provider. Make sure you discuss any questions you have with your health care provider.  Document Released: 07/07/2006 Document Revised: 01/27/2020 Document Reviewed: 08/27/2019  Elsevier Patient Education © 2020 [employer]

## 2020-07-14 NOTE — PROGRESS NOTES
Received bedside report from CARISA Torres. POC discussed. First assessment completed, call light within reach. Fall precautions within place. Patient laying in bed, currently having Echo done. Will continue to monitor.

## 2020-07-15 NOTE — DISCHARGE SUMMARY
Discharge Summary    Date of Admission: 7/12/2020  Date of Discharge: 7/14/2020  3:54 PM  Discharging Attending: Dr. Jake De La O  Discharging Senior Resident: Dr. Nicole  Discharging Intern: Dr. Bach    CHIEF COMPLAINT ON ADMISSION  Chief Complaint   Patient presents with   • Possible Stroke     Pt last seen normal at 1900. The pt reports he walked up from the bathroom and became dizzy, almost causing a near syncopal event. Pt's wife called EMS. Upon their assessment, they noted L sided facial droop and L sided extremity weakness.       Reason for Admission  Focal Neurological deficits with Critical Right ICA stenosis.     Admission Date  7/12/2020    CODE STATUS  Prior    HPI & HOSPITAL COURSE  This is a 75 y.o. 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    Patient was admitted for stroke rule out with the head CT with contrast within normal limits.  CTA  head and neck revealed critical right ICA stenosis with approximately 70% stenosis as well as 30 to 40% left ICA stenosis with normal appearance of the cervical vertebral arteries.  Patient subsequently also had an MRI which did not show any acute infarct but mild chronic microvascular ischemic changes and mild cerebral volume loss.    Patient was maintained on permissive hypertension as per recommendations by neurology, and initially started on dual antiplatelet therapy.  Vascular surgery was consulted for high-grade right carotid artery stenosis with string sign who performed right carotid endarterectomy on 13 July.  Patient tolerated procedure well and was discharged under stable conditions on aspirin alone and atorvastatin as per general surgery recommendation with follow-up in a week with   [facility].    During the hospitalization patient also had hyponatremia (possibly SIADH/ hypothyroidism that improved over the hospital course without significant fluid administartion] Patient had a TSH of 11.700 and free T4 of 0.85 without a prior diagnosis of hypothyroidism for which patient was started on 25 mcg of levothyroxine.    Patient's losartan dose was also reduced to 25 mcg in view of blood pressures being on the normal side without PRNs.         Therefore, he is discharged in fair and stable condition to home with close outpatient follow-up.    The patient met 2-midnight criteria for an inpatient stay at the time of discharge.    PHYSICAL EXAM ON DISCHARGE  Temp:  [36.4 °C (97.5 °F)-36.9 °C (98.5 °F)] 36.4 °C (97.6 °F)  Pulse:  [71-90] 76  Resp:  [16-17] 16  BP: (106-153)/(55-88) 152/88  SpO2:  [92 %-97 %] 97 %    Physical Exam  Constitutional:       General: He is not in acute distress.     Appearance: Normal appearance. He is not ill-appearing.   HENT:      Head: Normocephalic and atraumatic.      Right Ear: External ear normal.      Left Ear: External ear normal.      Nose: Nose normal. No congestion or rhinorrhea.      Mouth/Throat:      Mouth: Mucous membranes are dry.   Eyes:      Extraocular Movements: Extraocular movements intact.      Conjunctiva/sclera: Conjunctivae normal.      Pupils: Pupils are equal, round, and reactive to light.   Neck:      Musculoskeletal: Muscular tenderness present.      Comments: Bandage in place at site of CEA   Cardiovascular:      Rate and Rhythm: Normal rate and regular rhythm.      Pulses: Normal pulses.      Heart sounds: No murmur.   Pulmonary:      Effort: Pulmonary effort is normal. No respiratory distress.      Breath sounds: Normal breath sounds. No wheezing.   Abdominal:      General: Abdomen is flat. There is no distension.      Palpations: Abdomen is soft.      Tenderness: There is no abdominal tenderness.   Musculoskeletal: Normal range of motion.          General: No swelling or tenderness.   Skin:     General: Skin is warm and dry.      Capillary Refill: Capillary refill takes less than 2 seconds.   Neurological:      Mental Status: He is alert and oriented to person, place, and time.      Cranial Nerves: Cranial nerve deficit present.      Motor: No weakness.      Comments: Lower  motor neuron lesion of Facial nerve   Psychiatric:         Mood and Affect: Mood normal.         Behavior: Behavior normal.         Discharge Date  7/14/2020    FOLLOW UP ITEMS POST DISCHARGE  -Follow with Dr. Cai, general surgery for follow-up of endarterectomy procedure.  -Follow-up with primary care physician with repeat TSH came 6 weeks to titrate levothyroxine dose as needed.  -Follow-up with blood pressure medications.       DISCHARGE DIAGNOSES  Principal Problem:    Acute ischemic stroke (HCC) 2/2 to bilateral ICA stenosis (Chronic) POA: Unknown  Active Problems:    Hyponatremia POA: Unknown    Hyperlipidemia POA: Yes    HTN (hypertension) POA: Yes    Hypothyroidism POA: Unknown  Resolved Problems:    * No resolved hospital problems. *      FOLLOW UP  No future appointments.  Alex Bunch M.D.  [address] [place] [address]  [phone]    Go on 7/21/2020  2:15pm hospital follow up with Primary Care Physician with [facility].     Aly Tarango M.D.  [id number]  [place] [address]  [phone]    Go to  Hospital  left a voicemail with the general surgery office. If you do not hear back from them in 1 business days, please call the office and schedule a hospital follow up. Thank you!      MEDICATIONS ON DISCHARGE     Medication List      START taking these medications      Instructions   aspirin 81 MG Chew chewable tablet  Start taking on:  July 15, 2020  Commonly known as:  ASA   Take 1 Tab by mouth every day for 30 days.  Dose:  81 mg     atorvastatin 80 MG tablet  Commonly known as:  LIPITOR   Take 1 Tab by mouth every evening for  30 days.  Dose:  80 mg     levothyroxine 25 MCG Tabs  Start taking on:  July 15, 2020  Commonly known as:  SYNTHROID   Take 1 Tab by mouth Every morning on an empty stomach for 30 days.  Dose:  25 mcg        CHANGE how you take these medications      Instructions   losartan 50 MG Tabs  What changed:  how much to take  Commonly known as:  COZAAR   Take 0.5 Tabs by mouth every day for 30 days.  Dose:  25 mg            Allergies  No Known Allergies    DIET  No orders of the defined types were placed in this encounter.      ACTIVITY  As tolerated.  Weight bearing as tolerated    CONSULTATIONS  Dr. Dariel Owusu MD with Neurology Service and Dr. Aly Tarango MD were consulted.  Treatment options were discussed and plan of care agreed upon.    PROCEDURES  Carotid endarterectomy Right ICA

## 2020-07-22 ENCOUNTER — OFFICE VISIT (OUTPATIENT)
Dept: NEUROLOGY | Facility: MEDICAL CENTER | Age: 75
End: 2020-07-22
Payer: MEDICARE

## 2020-07-22 VITALS
OXYGEN SATURATION: 96 % | SYSTOLIC BLOOD PRESSURE: 140 MMHG | HEART RATE: 80 BPM | BODY MASS INDEX: 21.26 KG/M2 | TEMPERATURE: 98 F | RESPIRATION RATE: 16 BRPM | WEIGHT: 171 LBS | DIASTOLIC BLOOD PRESSURE: 80 MMHG | HEIGHT: 75 IN

## 2020-07-22 DIAGNOSIS — G45.9 TIA (TRANSIENT ISCHEMIC ATTACK): ICD-10-CM

## 2020-07-22 DIAGNOSIS — I65.23 BILATERAL CAROTID ARTERY STENOSIS: ICD-10-CM

## 2020-07-22 PROCEDURE — 99215 OFFICE O/P EST HI 40 MIN: CPT | Performed by: NURSE PRACTITIONER

## 2020-07-22 ASSESSMENT — ENCOUNTER SYMPTOMS
CONSTIPATION: 0
DIARRHEA: 0
MYALGIAS: 0
COUGH: 0
BRUISES/BLEEDS EASILY: 0
SHORTNESS OF BREATH: 0
SPEECH CHANGE: 0
CHILLS: 0
NERVOUS/ANXIOUS: 0
DIZZINESS: 0
NAUSEA: 0
DEPRESSION: 0
HEARTBURN: 0
FOCAL WEAKNESS: 0
VOMITING: 0
BACK PAIN: 0
NECK PAIN: 0
BLURRED VISION: 0
FEVER: 0
DOUBLE VISION: 0
HEADACHES: 0
PALPITATIONS: 0

## 2020-07-22 ASSESSMENT — PATIENT HEALTH QUESTIONNAIRE - PHQ9: CLINICAL INTERPRETATION OF PHQ2 SCORE: 0

## 2020-07-22 ASSESSMENT — FIBROSIS 4 INDEX: FIB4 SCORE: 2.95

## 2020-07-22 NOTE — PATIENT INSTRUCTIONS
Exercise at least 30 minutes daily, avoid red meat, fried foods, butter, cheese.   Eat 5-6 servings of vegetables and fruits daily, choose lean white meat without skin (chicken, turkey, white fish)--baked, broiled or grilled.          LDL goal less than 70, current 100, continue Atorvastatin 80mg, will need follow up with PCP for refills and to monitor liver function.    Blood pressure goal < 130/80, above goal today and at home, continue to monitor daily, if it remains over 130/80, notify primary care for medication adjustment        Stroke Prevention  Some medical conditions and lifestyle choices can lead to a higher risk for a stroke. You can help to prevent a stroke by making nutrition, lifestyle, and other changes.  What nutrition changes can be made?    · Eat healthy foods.  ? Choose foods that are high in fiber. These include:  § Fresh fruits.  § Fresh vegetables.  § Whole grains.  ? Eat at least 5 or more servings of fruits and vegetables each day. Try to fill half of your plate at each meal with fruits and vegetables.  ? Choose lean protein foods. These include:  § Lowfat (lean) cuts of meat.  § Chicken without skin.  § Fish.  § Tofu.  § Beans.  § Nuts.  ? Eat low-fat dairy products.  ? Avoid foods that:  § Are high in salt (sodium).  § Have saturated fat.  § Have trans fat.  § Have cholesterol.  § Are processed.  § Are premade.  · Follow eating guidelines as told by your doctor. These may include:  ? Reducing how many calories you eat and drink each day.  ? Limiting how much salt you eat or drink each day to 1,500 milligrams (mg).  ? Using only healthy fats for cooking. These include:  § Olive oil.  § Canola oil.  § Sunflower oil.  ? Counting how many carbohydrates you eat and drink each day.  What lifestyle changes can be made?  · Try to stay at a healthy weight. Talk to your doctor about what a good weight is for you.  · Get at least 30 minutes of moderate physical activity at least 5 days a week. This  can include:  ? Fast walking.  ? Biking.  ? Swimming.  · Do not use any products that have nicotine or tobacco. This includes cigarettes and e-cigarettes. If you need help quitting, ask your doctor. Avoid being around tobacco smoke in general.  · Limit how much alcohol you drink to no more than 1 drink a day for nonpregnant women and 2 drinks a day for men. One drink equals 12 oz of beer, 5 oz of wine, or 1½ oz of hard liquor.  · Do not use drugs.  · Avoid taking birth control pills. Talk to your doctor about the risks of taking birth control pills if:  ? You are over 35 years old.  ? You smoke.  ? You get migraines.  ? You have had a blood clot.  What other changes can be made?  · Manage your cholesterol.  ? It is important to eat a healthy diet.  ? If your cholesterol cannot be managed through your diet, you may also need to take medicines. Take medicines as told by your doctor.  · Manage your diabetes.  ? It is important to eat a healthy diet and to exercise regularly.  ? If your blood sugar cannot be managed through diet and exercise, you may need to take medicines. Take medicines as told by your doctor.  · Control your high blood pressure (hypertension).  ? Try to keep your blood pressure below 130/80. This can help lower your risk of stroke.  ? It is important to eat a healthy diet and to exercise regularly.  ? If your blood pressure cannot be managed through diet and exercise, you may need to take medicines. Take medicines as told by your doctor.  ? Ask your doctor if you should check your blood pressure at home.  ? Have your blood pressure checked every year. Do this even if your blood pressure is normal.  · Talk to your doctor about getting checked for a sleep disorder. Signs of this can include:  ? Snoring a lot.  ? Feeling very tired.  · Take over-the-counter and prescription medicines only as told by your doctor. These may include aspirin or blood thinners (antiplatelets or anticoagulants).  · Make sure  "that any other medical conditions you have are managed.  Where to find more information  · American Stroke Association: www.strokeassociation.org  · National Stroke Association: www.stroke.org  Get help right away if:  · You have any symptoms of stroke. \"BE FAST\" is an easy way to remember the main warning signs:  ? B - Balance. Signs are dizziness, sudden trouble walking, or loss of balance.  ? E - Eyes. Signs are trouble seeing or a sudden change in how you see.  ? F - Face. Signs are sudden weakness or loss of feeling of the face, or the face or eyelid drooping on one side.  ? A - Arms. Signs are weakness or loss of feeling in an arm. This happens suddenly and usually on one side of the body.  ? S - Speech. Signs are sudden trouble speaking, slurred speech, or trouble understanding what people say.  ? T - Time. Time to call emergency services. Write down what time symptoms started.  · You have other signs of stroke, such as:  ? A sudden, very bad headache with no known cause.  ? Feeling sick to your stomach (nausea).  ? Throwing up (vomiting).  ? Jerky movements you cannot control (seizure).  These symptoms may represent a serious problem that is an emergency. Do not wait to see if the symptoms will go away. Get medical help right away. Call your local emergency services (911 in the [place] Do not drive yourself to the hospital.  Summary  · You can prevent a stroke by eating healthy, exercising, not smoking, drinking less alcohol, and treating other health problems, such as diabetes, high blood pressure, or high cholesterol.  · Do not use any products that contain nicotine or tobacco, such as cigarettes and e-cigarettes.  · Get help right away if you have any signs or symptoms of a stroke.  This information is not intended to replace advice given to you by your health care provider. Make sure you discuss any questions you have with your health care provider.  Document Released: 06/18/2013 Document Revised: " 02/13/2020 Document Reviewed: 03/21/2018  Elsevier Patient Education © 2020 [employer]

## 2020-07-22 NOTE — PROGRESS NOTES
Subjective:    JACKY Echeverria is a 75 y.o. male who presents to [facility] for evaluation of  TIA.    He presented to [facility] on 7/12/2020 with complaints of left facial droop/left sided weakness, dull frontal h/a and recurrent episodes of dizziness/syncope.  He was found to have critical R carotid stenosis and had an uncomplicated R CEA on 7/13/2020.    His symptoms lasted for about 36 minutes.      Blood pressure at home has been 140s-150s/80s, his dose of Cozaar was just increased yesterday.    Review of Systems   Constitutional: Negative for chills and fever.   HENT: Positive for hearing loss and tinnitus.    Eyes: Negative for blurred vision and double vision.   Respiratory: Negative for cough and shortness of breath.    Cardiovascular: Negative for chest pain and palpitations.   Gastrointestinal: Negative for constipation, diarrhea, heartburn, nausea and vomiting.   Genitourinary: Negative for dysuria.   Musculoskeletal: Negative for back pain, myalgias and neck pain.   Skin: Negative for rash.   Neurological: Negative for dizziness, speech change, focal weakness and headaches.   Endo/Heme/Allergies: Does not bruise/bleed easily.   Psychiatric/Behavioral: Negative for depression. The patient is not nervous/anxious.        Current Outpatient Medications on File Prior to Visit   Medication Sig Dispense Refill   • Multiple Vitamins-Minerals (PRESERVISION AREDS 2+MULTI VIT PO) Take  by mouth.     • losartan (COZAAR) 50 MG Tab Take 0.5 Tabs by mouth every day for 30 days. (Patient taking differently: Take 50 mg by mouth every day.) 15 Tab 3   • aspirin (ASA) 81 MG Chew Tab chewable tablet Take 1 Tab by mouth every day for 30 days. 30 Tab 3   • atorvastatin (LIPITOR) 80 MG tablet Take 1 Tab by mouth every evening for 30 days. 30 Tab 3   • levothyroxine (SYNTHROID) 25 MCG Tab Take 1 Tab by mouth Every morning on an empty stomach for 30 days. 30 Tab 3     No current  "facility-administered medications on file prior to visit.         Objective:   I personally reviewed the imaging below and agree with findings:    CTA head    1 Focal stenosis of the left internal carotid artery just below the skull base with approximately 50% resultant stenosis.  2.  Otherwise normal CT angiography of the head.    CTA neck    1.  Critical right ICA stenosis as described above.  2.  Approximate 30-40% left ICA stenosis.  3.  Normal appearance of the cervical vertebral arteries.    MRI brain  1.  No acute infarct.  2.  Mild cerebral volume loss.  3.  Mild chronic microvascular ischemic disease.      TTE: LVEF 75%, bubble negative, LA size WNL, CANDE 7.      Labs:  Cr 0.61, , A1C 5.8    Encounter Vitals  Standard Vitals  Vitals  Blood Pressure : (!) 180/95  Temperature: 36.7 °C (98 °F)  Temp src: Temporal  Pulse: 80  Respiration: 16  Pulse Oximetry: 96 %  Height: 190.5 cm (6' 3\")  Weight: 77.6 kg (171 lb)  Encounter Vitals  Temperature: 36.7 °C (98 °F)  Temp src: Temporal  Blood Pressure : (!) 180/95  Pulse: 80  Respiration: 16  Pulse Oximetry: 96 %  Weight: 77.6 kg (171 lb)  Height: 190.5 cm (6' 3\")  BMI (Calculated): 21.37      Physical Exam    General:  Alert, no apparent distress,  Psych:   mood and affect WNL  Neuro:   Oriented X 4, speech fluent, naming and memory intact                 cranial nerves II-XII intact                 Strength 5/5 BUE/BLE, no drift                  Sensation to PP equal bilaterally                 No limb ataxia with finger to nose and heel to shin                 Ambulates with steady gait.                 Rhomberg negative                Biceps,brachioradialis, tricep, patellar and ankle reflex all 2+    Cardiovascular:    S1S2, no abnormal rhythm auscultated, no peripheral edema  Neck:                     No carotid bruits noted   Pulmonary:            Respirations easy, lungs clear to auscultation all fields.    Skin:                     No obvious " matthew.       Assessment/Plan:       1. TIA (transient ischemic attack), likely due to right carotid artery stens[place], had R CEA on 7/13/2020       ABCD2 Score  Age ?60 years   No = 0  Yes = +1 1   BP ? 140/90 mm/hg (initial either SBP ?140 or DBP ?90         No = 0     Yes = +1 1   Clinical Features of the TIA        Unilateral weakness +2        Speech disturbance w/o weakness +1        Other Symptoms   = 0 2   Durations of symptoms:        < 10 minutes    =    0         10-59 minutes =   +1         ?60 minutes    =    +2 1   History of diabetes              No   =     0               Yes =  +1 0   Total Score 5       LDL goal less than 70, current 100, continue Atorvastatin 80mg, will need follow up with PCP for refills and to monitor liver function.    Blood pressure goal < 130/80, above goal today and at home, continue to monitor daily, if it remains over 130/80, notify primary care for medication adjustment  Repeat reading 140/80    Exercise at least 30 minutes daily, avoid red meat, fried foods, butter, cheese.   Eat 5-6 servings of vegetables and fruits daily, choose lean white meat without skin (chicken, turkey, white fish)--baked, broiled or grilled.        2. Bilateral carotid artery stenosis  Continue ASA daily for stroke prevention, discussed signs of bleeding        Follow up as needed

## 2020-07-25 ENCOUNTER — APPOINTMENT (RX ONLY)
Dept: URBAN - METROPOLITAN AREA CLINIC 4 | Facility: CLINIC | Age: 75
Setting detail: DERMATOLOGY
End: 2020-07-25

## 2020-07-25 DIAGNOSIS — D18.0 HEMANGIOMA: ICD-10-CM

## 2020-07-25 PROBLEM — D18.01 HEMANGIOMA OF SKIN AND SUBCUTANEOUS TISSUE: Status: ACTIVE | Noted: 2020-07-25

## 2020-07-25 PROBLEM — D48.5 NEOPLASM OF UNCERTAIN BEHAVIOR OF SKIN: Status: ACTIVE | Noted: 2020-07-25

## 2020-07-25 PROCEDURE — 99213 OFFICE O/P EST LOW 20 MIN: CPT | Mod: 25

## 2020-07-25 PROCEDURE — 11102 TANGNTL BX SKIN SINGLE LES: CPT

## 2020-07-25 PROCEDURE — ? BIOPSY BY SHAVE METHOD

## 2020-07-25 ASSESSMENT — LOCATION DETAILED DESCRIPTION DERM: LOCATION DETAILED: RIGHT LATERAL INFERIOR CHEST

## 2020-07-25 ASSESSMENT — LOCATION ZONE DERM: LOCATION ZONE: TRUNK

## 2020-07-25 ASSESSMENT — LOCATION SIMPLE DESCRIPTION DERM: LOCATION SIMPLE: CHEST

## 2020-07-25 NOTE — PROCEDURE: BIOPSY BY SHAVE METHOD
Detail Level: Detailed
Depth Of Biopsy: dermis
Was A Bandage Applied: Yes
Size Of Lesion In Cm: 0
Biopsy Type: H and E
Biopsy Method: Personna blade
Anesthesia Type: 1% lidocaine with epinephrine
Anesthesia Volume In Cc: 0.5
Hemostasis: Drysol
Wound Care: Petrolatum
Dressing: bandage
Destruction After The Procedure: No
Type Of Destruction Used: Curettage
Curettage Text: The wound bed was treated with curettage after the biopsy was performed.
Cryotherapy Text: The wound bed was treated with cryotherapy after the biopsy was performed.
Electrodesiccation Text: The wound bed was treated with electrodesiccation after the biopsy was performed.
Electrodesiccation And Curettage Text: The wound bed was treated with electrodesiccation and curettage after the biopsy was performed.
Silver Nitrate Text: The wound bed was treated with silver nitrate after the biopsy was performed.
Lab: [id number]
Lab Facility: 
Consent: Written consent was obtained and risks were reviewed including but not limited to scarring, infection, bleeding, scabbing, incomplete removal, nerve damage and allergy to anesthesia.
Post-Care Instructions: I reviewed with the patient in detail post-care instructions. Patient is to keep the biopsy site dry overnight, and then apply bacitracin twice daily until healed. Patient may apply hydrogen peroxide soaks to remove any crusting.
Notification Instructions: Patient will be notified of biopsy results. However, patient instructed to call the office if not contacted within 2 weeks.
Billing Type: Third-Party Bill
Information: Selecting Yes will display possible errors in your note based on the variables you have selected. This validation is only offered as a suggestion for you. PLEASE NOTE THAT THE VALIDATION TEXT WILL BE REMOVED WHEN YOU FINALIZE YOUR NOTE. IF YOU WANT TO FAX A PRELIMINARY NOTE YOU WILL NEED TO TOGGLE THIS TO 'NO' IF YOU DO NOT WANT IT IN YOUR FAXED NOTE.

## 2021-01-15 DIAGNOSIS — Z23 NEED FOR VACCINATION: ICD-10-CM

## 2021-01-28 ENCOUNTER — HOSPITAL ENCOUNTER (OUTPATIENT)
Dept: RADIOLOGY | Facility: MEDICAL CENTER | Age: 76
End: 2021-01-28
Attending: SURGERY
Payer: MEDICARE

## 2021-01-28 DIAGNOSIS — I65.23 BILATERAL CAROTID ARTERY STENOSIS: ICD-10-CM

## 2021-01-28 PROCEDURE — 93880 EXTRACRANIAL BILAT STUDY: CPT

## 2021-01-29 PROCEDURE — 93880 EXTRACRANIAL BILAT STUDY: CPT | Mod: 26 | Performed by: INTERNAL MEDICINE

## 2021-03-12 ENCOUNTER — APPOINTMENT (RX ONLY)
Dept: URBAN - METROPOLITAN AREA CLINIC 4 | Facility: CLINIC | Age: 76
Setting detail: DERMATOLOGY
End: 2021-03-12

## 2021-03-12 DIAGNOSIS — L82.0 INFLAMED SEBORRHEIC KERATOSIS: ICD-10-CM

## 2021-03-12 DIAGNOSIS — L11.1 TRANSIENT ACANTHOLYTIC DERMATOSIS [GROVER]: ICD-10-CM

## 2021-03-12 PROBLEM — D48.5 NEOPLASM OF UNCERTAIN BEHAVIOR OF SKIN: Status: ACTIVE | Noted: 2021-03-12

## 2021-03-12 PROCEDURE — ? BIOPSY BY SHAVE METHOD

## 2021-03-12 PROCEDURE — ? LIQUID NITROGEN

## 2021-03-12 PROCEDURE — ? PRESCRIPTION

## 2021-03-12 PROCEDURE — 99213 OFFICE O/P EST LOW 20 MIN: CPT | Mod: 25

## 2021-03-12 PROCEDURE — 17110 DESTRUCTION B9 LES UP TO 14: CPT

## 2021-03-12 PROCEDURE — 11102 TANGNTL BX SKIN SINGLE LES: CPT | Mod: 59

## 2021-03-12 RX ORDER — TRIAMCINOLONE ACETONIDE 1 MG/G
CREAM TOPICAL BID
Qty: 1 | Refills: 3 | Status: ERX | COMMUNITY
Start: 2021-03-12

## 2021-03-12 RX ADMIN — TRIAMCINOLONE ACETONIDE 1: 1 CREAM TOPICAL at 00:00

## 2021-03-12 ASSESSMENT — LOCATION DETAILED DESCRIPTION DERM
LOCATION DETAILED: RIGHT LATERAL UPPER BACK
LOCATION DETAILED: LEFT SUPERIOR MEDIAL MIDBACK
LOCATION DETAILED: LEFT INFERIOR UPPER BACK
LOCATION DETAILED: LEFT MID-UPPER BACK
LOCATION DETAILED: RIGHT INFERIOR LATERAL UPPER BACK

## 2021-03-12 ASSESSMENT — LOCATION SIMPLE DESCRIPTION DERM
LOCATION SIMPLE: LEFT UPPER BACK
LOCATION SIMPLE: LEFT LOWER BACK
LOCATION SIMPLE: RIGHT UPPER BACK

## 2021-03-12 ASSESSMENT — LOCATION ZONE DERM: LOCATION ZONE: TRUNK

## 2021-03-18 ENCOUNTER — RX ONLY (OUTPATIENT)
Age: 76
Setting detail: RX ONLY
End: 2021-03-18

## 2021-03-18 RX ORDER — IMIQUIMOD 50 MG/G
CREAM TOPICAL
Qty: 6 | Refills: 0 | Status: ERX

## 2021-04-02 ENCOUNTER — APPOINTMENT (RX ONLY)
Dept: URBAN - METROPOLITAN AREA CLINIC 4 | Facility: CLINIC | Age: 76
Setting detail: DERMATOLOGY
End: 2021-04-02

## 2021-04-02 DIAGNOSIS — L30.0 NUMMULAR DERMATITIS: ICD-10-CM | Status: WORSENING

## 2021-04-02 DIAGNOSIS — D17 BENIGN LIPOMATOUS NEOPLASM: ICD-10-CM

## 2021-04-02 DIAGNOSIS — L11.1 TRANSIENT ACANTHOLYTIC DERMATOSIS [GROVER]: ICD-10-CM | Status: WELL CONTROLLED

## 2021-04-02 PROBLEM — C44.319 BASAL CELL CARCINOMA OF SKIN OF OTHER PARTS OF FACE: Status: ACTIVE | Noted: 2021-04-02

## 2021-04-02 PROBLEM — D17.1 BENIGN LIPOMATOUS NEOPLASM OF SKIN AND SUBCUTANEOUS TISSUE OF TRUNK: Status: ACTIVE | Noted: 2021-04-02

## 2021-04-02 PROCEDURE — 99213 OFFICE O/P EST LOW 20 MIN: CPT

## 2021-04-02 PROCEDURE — ? COUNSELING

## 2021-04-02 PROCEDURE — ? PRESCRIPTION

## 2021-04-02 PROCEDURE — ? OBSERVATION

## 2021-04-02 PROCEDURE — ? ADDITIONAL NOTES

## 2021-04-02 RX ORDER — CLOBETASOL PROPIONATE 0.5 MG/G
CREAM TOPICAL BID
Qty: 1 | Refills: 11 | Status: ERX | COMMUNITY
Start: 2021-04-02

## 2021-04-02 RX ORDER — IMIQUIMOD 50 MG/G
CREAM TOPICAL
Qty: 6 | Refills: 0 | Status: ERX

## 2021-04-02 RX ADMIN — CLOBETASOL PROPIONATE 1: 0.5 CREAM TOPICAL at 00:00

## 2021-04-02 ASSESSMENT — LOCATION ZONE DERM: LOCATION ZONE: TRUNK

## 2021-04-02 ASSESSMENT — LOCATION DETAILED DESCRIPTION DERM
LOCATION DETAILED: RIGHT MID-UPPER BACK
LOCATION DETAILED: RIGHT INFERIOR LATERAL UPPER BACK
LOCATION DETAILED: RIGHT SUPERIOR MEDIAL MIDBACK

## 2021-04-02 ASSESSMENT — LOCATION SIMPLE DESCRIPTION DERM
LOCATION SIMPLE: RIGHT LOWER BACK
LOCATION SIMPLE: RIGHT UPPER BACK

## 2021-04-02 NOTE — PROCEDURE: ADDITIONAL NOTES
Additional Notes: Instructed patient to apply cetaphil  to skin after bathing
Render Risk Assessment In Note?: no
Detail Level: Simple

## 2021-05-24 ENCOUNTER — APPOINTMENT (RX ONLY)
Dept: URBAN - METROPOLITAN AREA CLINIC 4 | Facility: CLINIC | Age: 76
Setting detail: DERMATOLOGY
End: 2021-05-24

## 2021-05-24 DIAGNOSIS — L30.9 DERMATITIS, UNSPECIFIED: ICD-10-CM

## 2021-05-24 DIAGNOSIS — L11.1 TRANSIENT ACANTHOLYTIC DERMATOSIS [GROVER]: ICD-10-CM | Status: WORSENING

## 2021-05-24 PROCEDURE — 11103 TANGNTL BX SKIN EA SEP/ADDL: CPT

## 2021-05-24 PROCEDURE — ? BIOPSY BY SHAVE METHOD

## 2021-05-24 PROCEDURE — ? COUNSELING

## 2021-05-24 PROCEDURE — ? PRESCRIPTION

## 2021-05-24 PROCEDURE — 11102 TANGNTL BX SKIN SINGLE LES: CPT

## 2021-05-24 PROCEDURE — 99214 OFFICE O/P EST MOD 30 MIN: CPT | Mod: 25

## 2021-05-24 RX ORDER — TRIAMCINOLONE ACETONIDE 1 MG/G
CREAM TOPICAL BID
Qty: 1 | Refills: 2 | Status: ERX

## 2021-05-24 ASSESSMENT — LOCATION SIMPLE DESCRIPTION DERM
LOCATION SIMPLE: RIGHT UPPER BACK
LOCATION SIMPLE: RIGHT LOWER BACK

## 2021-05-24 ASSESSMENT — LOCATION ZONE DERM: LOCATION ZONE: TRUNK

## 2021-05-24 ASSESSMENT — LOCATION DETAILED DESCRIPTION DERM
LOCATION DETAILED: RIGHT LATERAL UPPER BACK
LOCATION DETAILED: RIGHT INFERIOR LATERAL MIDBACK

## 2021-05-24 NOTE — HPI: RASH
What Type Of Note Output Would You Prefer (Optional)?: Standard Output
Is The Patient Presenting As Previously Scheduled?: No, they are a work-in
How Severe Is Your Rash?: severe
Is This A New Presentation, Or A Follow-Up?: Rash
Additional History: Rash worsening, clobetasol helps but he ran out.

## 2021-08-16 RX ORDER — TRIAMCINOLONE ACETONIDE 1 MG/G
CREAM TOPICAL BID
Qty: 1 | Refills: 2 | Status: ERX

## 2021-09-17 ENCOUNTER — APPOINTMENT (RX ONLY)
Dept: URBAN - METROPOLITAN AREA CLINIC 4 | Facility: CLINIC | Age: 76
Setting detail: DERMATOLOGY
End: 2021-09-17

## 2021-09-17 DIAGNOSIS — L30.9 DERMATITIS, UNSPECIFIED: ICD-10-CM

## 2021-09-17 DIAGNOSIS — Z85.828 PERSONAL HISTORY OF OTHER MALIGNANT NEOPLASM OF SKIN: ICD-10-CM

## 2021-09-17 DIAGNOSIS — D49.2 NEOPLASM OF UNSPECIFIED BEHAVIOR OF BONE, SOFT TISSUE, AND SKIN: ICD-10-CM

## 2021-09-17 DIAGNOSIS — L259 CONTACT DERMATITIS AND OTHER ECZEMA, UNSPECIFIED CAUSE: ICD-10-CM | Status: WORSENING

## 2021-09-17 PROBLEM — L30.8 OTHER SPECIFIED DERMATITIS: Status: ACTIVE | Noted: 2021-09-17

## 2021-09-17 PROCEDURE — ? OBSERVATION

## 2021-09-17 PROCEDURE — ? COUNSELING

## 2021-09-17 PROCEDURE — 99213 OFFICE O/P EST LOW 20 MIN: CPT | Mod: 25

## 2021-09-17 PROCEDURE — 11102 TANGNTL BX SKIN SINGLE LES: CPT

## 2021-09-17 PROCEDURE — ? BIOPSY BY SHAVE METHOD

## 2021-09-17 ASSESSMENT — LOCATION ZONE DERM
LOCATION ZONE: ARM
LOCATION ZONE: NOSE
LOCATION ZONE: TRUNK
LOCATION ZONE: FACE

## 2021-09-17 ASSESSMENT — LOCATION SIMPLE DESCRIPTION DERM
LOCATION SIMPLE: UPPER BACK
LOCATION SIMPLE: LEFT FOREARM
LOCATION SIMPLE: LEFT NOSE
LOCATION SIMPLE: RIGHT CHEEK

## 2021-09-17 ASSESSMENT — LOCATION DETAILED DESCRIPTION DERM
LOCATION DETAILED: INFERIOR THORACIC SPINE
LOCATION DETAILED: LEFT NARIS
LOCATION DETAILED: LEFT PROXIMAL RADIAL DORSAL FOREARM
LOCATION DETAILED: RIGHT CENTRAL MALAR CHEEK

## 2021-09-23 ENCOUNTER — RX ONLY (OUTPATIENT)
Age: 76
Setting detail: RX ONLY
End: 2021-09-23

## 2021-09-23 RX ORDER — TRIAMCINOLONE ACETONIDE 1 MG/G
CREAM TOPICAL
Qty: 454 | Refills: 1 | Status: ERX

## 2021-09-23 RX ORDER — CLOBETASOL PROPIONATE 0.5 MG/G
OINTMENT TOPICAL
Qty: 60 | Refills: 5 | Status: ERX

## 2021-10-15 ENCOUNTER — APPOINTMENT (RX ONLY)
Dept: URBAN - METROPOLITAN AREA CLINIC 4 | Facility: CLINIC | Age: 76
Setting detail: DERMATOLOGY
End: 2021-10-15

## 2021-10-15 DIAGNOSIS — L20.89 OTHER ATOPIC DERMATITIS: ICD-10-CM | Status: INADEQUATELY CONTROLLED

## 2021-10-15 PROBLEM — L30.9 DERMATITIS, UNSPECIFIED: Status: ACTIVE | Noted: 2021-10-15

## 2021-10-15 PROBLEM — L20.84 INTRINSIC (ALLERGIC) ECZEMA: Status: ACTIVE | Noted: 2021-10-15

## 2021-10-15 PROCEDURE — ? COUNSELING

## 2021-10-15 PROCEDURE — ? BIOPSY BY SHAVE METHOD

## 2021-10-15 PROCEDURE — 11102 TANGNTL BX SKIN SINGLE LES: CPT

## 2021-10-15 PROCEDURE — 99213 OFFICE O/P EST LOW 20 MIN: CPT | Mod: 25

## 2021-10-15 ASSESSMENT — LOCATION SIMPLE DESCRIPTION DERM
LOCATION SIMPLE: ABDOMEN
LOCATION SIMPLE: RIGHT UPPER ARM
LOCATION SIMPLE: LEFT UPPER BACK
LOCATION SIMPLE: RIGHT UPPER BACK

## 2021-10-15 ASSESSMENT — LOCATION DETAILED DESCRIPTION DERM
LOCATION DETAILED: RIGHT INFERIOR MEDIAL UPPER BACK
LOCATION DETAILED: LEFT MEDIAL UPPER BACK
LOCATION DETAILED: EPIGASTRIC SKIN
LOCATION DETAILED: RIGHT ANTERIOR PROXIMAL UPPER ARM

## 2021-10-15 ASSESSMENT — LOCATION ZONE DERM
LOCATION ZONE: ARM
LOCATION ZONE: TRUNK

## 2021-10-15 NOTE — PROCEDURE: COUNSELING
Detail Level: Detailed
Patient Specific Counseling (Will Not Stick From Patient To Patient): Discussed with Martin that ddx includes spongiotic medication reaction, prodromal bullous pemphigoid, and allergic contact dermatitis.  If direct immunofluoresence is negative will proceed with patch testing.  Pt in agreement with this plan and will continue with topical corticosteroids for now as needed.

## 2021-10-15 NOTE — PROCEDURE: BIOPSY BY SHAVE METHOD
Detail Level: Detailed
Depth Of Biopsy: dermis
Was A Bandage Applied: Yes
Size Of Lesion In Cm: 0
Biopsy Type: DIF
Biopsy Method: Personna blade
Anesthesia Type: 1% lidocaine with epinephrine
Anesthesia Volume In Cc: 0.5
Hemostasis: Drysol
Wound Care: Petrolatum
Dressing: bandage
Destruction After The Procedure: No
Type Of Destruction Used: Curettage
Curettage Text: The wound bed was treated with curettage after the biopsy was performed.
Cryotherapy Text: The wound bed was treated with cryotherapy after the biopsy was performed.
Electrodesiccation Text: The wound bed was treated with electrodesiccation after the biopsy was performed.
Electrodesiccation And Curettage Text: The wound bed was treated with electrodesiccation and curettage after the biopsy was performed.
Silver Nitrate Text: The wound bed was treated with silver nitrate after the biopsy was performed.
Consent: Written consent was obtained and risks were reviewed including but not limited to scarring, infection, bleeding, scabbing, incomplete removal, nerve damage and allergy to anesthesia.
Post-Care Instructions: I reviewed with the patient in detail post-care instructions. Patient is to keep the biopsy site dry overnight, and then apply bacitracin twice daily until healed. Patient may apply hydrogen peroxide soaks to remove any crusting.
Notification Instructions: Patient will be notified of biopsy results. However, patient instructed to call the office if not contacted within 2 weeks.
Billing Type: Third-Party Bill
Information: Selecting Yes will display possible errors in your note based on the variables you have selected. This validation is only offered as a suggestion for you. PLEASE NOTE THAT THE VALIDATION TEXT WILL BE REMOVED WHEN YOU FINALIZE YOUR NOTE. IF YOU WANT TO FAX A PRELIMINARY NOTE YOU WILL NEED TO TOGGLE THIS TO 'NO' IF YOU DO NOT WANT IT IN YOUR FAXED NOTE.

## 2021-11-15 ENCOUNTER — APPOINTMENT (RX ONLY)
Dept: URBAN - METROPOLITAN AREA CLINIC 4 | Facility: CLINIC | Age: 76
Setting detail: DERMATOLOGY
End: 2021-11-15

## 2021-11-15 DIAGNOSIS — L259 CONTACT DERMATITIS AND OTHER ECZEMA, UNSPECIFIED CAUSE: ICD-10-CM

## 2021-11-15 PROBLEM — L30.9 DERMATITIS, UNSPECIFIED: Status: ACTIVE | Noted: 2021-11-15

## 2021-11-15 PROCEDURE — ? PATCH TESTING

## 2021-11-15 PROCEDURE — 95044 PATCH/APPLICATION TESTS: CPT

## 2021-11-15 ASSESSMENT — LOCATION ZONE DERM: LOCATION ZONE: TRUNK

## 2021-11-15 ASSESSMENT — LOCATION DETAILED DESCRIPTION DERM: LOCATION DETAILED: INFERIOR THORACIC SPINE

## 2021-11-15 ASSESSMENT — LOCATION SIMPLE DESCRIPTION DERM: LOCATION SIMPLE: UPPER BACK

## 2021-11-15 NOTE — HPI: TESTING (PATCH TESTING)
Has Your Rash Been Biopsied Before?: has been biopsied previously
Have You Had Previous Patch Testing In The Past?: none
How Severe Is Your Rash At Its Worst?: moderate
Who Is Your Referring Doctor?: Julia

## 2021-11-15 NOTE — PROCEDURE: PATCH TESTING
Consent: Verbal consent obtained, risks reviewed including but not limited to rash, itching, allergic reaction, systemic rash, remote possibility of anaphylaxis to allergen.
Number Of Patches (Maximum Allowable Per Dos By Cms Is 90): 80
Detail Level: Zone
Post-Care Instructions: I reviewed with the patient in detail post-care instructions. Patient should not sweat, pick at, or get the patches wet for 48 hours.

## 2021-11-17 ENCOUNTER — APPOINTMENT (RX ONLY)
Dept: URBAN - METROPOLITAN AREA CLINIC 4 | Facility: CLINIC | Age: 76
Setting detail: DERMATOLOGY
End: 2021-11-17

## 2021-11-17 DIAGNOSIS — L259 CONTACT DERMATITIS AND OTHER ECZEMA, UNSPECIFIED CAUSE: ICD-10-CM

## 2021-11-17 PROBLEM — L30.9 DERMATITIS, UNSPECIFIED: Status: ACTIVE | Noted: 2021-11-17

## 2021-11-17 PROCEDURE — ? CORE ACDS PATCH TEST READING

## 2021-11-17 PROCEDURE — ? ADDITIONAL NOTES

## 2021-11-17 ASSESSMENT — LOCATION DETAILED DESCRIPTION DERM
LOCATION DETAILED: PERIUMBILICAL SKIN
LOCATION DETAILED: RIGHT LATERAL SUPERIOR CHEST
LOCATION DETAILED: RIGHT MEDIAL UPPER BACK
LOCATION DETAILED: RIGHT LATERAL ABDOMEN
LOCATION DETAILED: LEFT LATERAL SUPERIOR CHEST

## 2021-11-17 ASSESSMENT — LOCATION ZONE DERM: LOCATION ZONE: TRUNK

## 2021-11-17 ASSESSMENT — LOCATION SIMPLE DESCRIPTION DERM
LOCATION SIMPLE: ABDOMEN
LOCATION SIMPLE: RIGHT UPPER BACK
LOCATION SIMPLE: CHEST

## 2021-11-17 NOTE — PROCEDURE: CORE ACDS PATCH TEST READING
Allergen 29 Reaction: no reaction
Name Of Allergen 24: thiuram mix A
Show Allergen Counseling In The Note?: Yes
Name Of Allergen 42: dimethylaminopropylamine
Name Of Allergen 32: 2-mercaptobenzothiazole
Name Of Allergen 6: fragrance mix
Name Of Allergen 78: butylhydroxytoluene
Name Of Allergen 70: ethyl hexyl glycerol
Name Of Allergen 35: disperse blue mix 124/106
Name Of Allergen 59: 4-chloro-3-cresol
Name Of Allergen 28: gold sodium thiosulfate
Name Of Allergen 65: compositae mix
Name Of Allergen 56: tosylamide formaldehyde resin
Name Of Allergen 50: ethyl acrylate
Name Of Allergen 61: benzophenone chloride
Name Of Allergen 23: 2-bromo-2-nitropropane-1,3-diol
Name Of Allergen 16: black rubber mix
Name Of Allergen 37: propylene glycol
Name Of Allergen 33: bacitracin
Name Of Allergen 46: methyl methacrylate
Name Of Allergen 67: sorbitan sesquioleate
Name Of Allergen 27: tixocortol-21-pivalate
Name Of Allergen 53: propolis
Name Of Allergen 66: dimethylol dihydroxyethyleneurea
Name Of Allergen 43: 2-hydroxyethyl-methacrylate
Name Of Allergen 22: mercapto mix A
Name Of Allergen 12: cobalt 2 chloride hexahydrate
Name Of Allergen 11: ethylenediamine dihydrochloride
Detail Level: Zone
Name Of Allergen 54: 4-chloro-3,5-xylenol
Name Of Allergen 74: ethyl cyanoacrylate
Name Of Allergen 73: amidoamine
Name Of Allergen 55: 2-hydroxy-4-methoxybenzophenone
Name Of Allergen 19: methyldibromo glutaronitrile
Name Of Allergen 13: petrolatum-tert- butylphenol formaldehyde resin
Name Of Allergen 2: amerchol L101
Name Of Allergen 51: tea tree oil, oxidized
Name Of Allergen 38: iodopropyynyl betaine
Name Of Allergen 79: 2-ethylhexyl-4-methoxycinnamate
Name Of Allergen 34: fragrance mix 2
Name Of Allergen 63: sorbic acid
Name Of Allergen 20: petrolatum-phenylenediamine
Name Of Allergen 72: clobetasol-17-propionate
What Reading Time Point?: 48 hour
Name Of Allergen 30: budesonide
Name Of Allergen 71: triamcinalone acetonide
Name Of Allergen 40: cocamidopropyl betaine
Name Of Allergen 45: decyl glucoside
Name Of Allergen 21: formaldehyde
Name Of Allergen 60: benzalkonium chloride
Name Of Allergen 1: nickel sulfate hexahydrate
Name Of Allergen 3: neomycin sulfate
Name Of Allergen 77: benzoic acid
Name Of Allergen 5: DMDM hydantoin
Name Of Allergen 69: cetylstearyalcohol
Name Of Allergen 7: colophony
Name Of Allergen 62: sodium benzoate
Name Of Allergen 75: phenoxyethanol
Name Of Allergen 64: cananga odorata
Name Of Allergen 76: disperse orange-3
Name Of Allergen 9: methylisothiazolinone
Name Of Allergen 25: diazolidinyl urea
Name Of Allergen 44: oleamidopropyl dimethylamine
Name Of Allergen 4: potassium dichromate
Name Of Allergen 48: cinnamic aldehyde
Name Of Allergen 57: sesquiterpenelactone mix
Name Of Allergen 80: benzyl alcohol
Name Of Allergen 68: 1,3-diphenylguanidine
Name Of Allergen 17: methylchloroisothiazolinone/methylisothiazolinone
Name Of Allergen 49: dl alpha tocopherol
Name Of Allergen 8: paraben mix B
Name Of Allergen 41: mixed dialkyl thioureas
Name Of Allergen 18: quaternium 15
Name Of Allergen 29: imidazolidinyl urea
Name Of Allergen 52: chlorhexidine diclugonate
Name Of Allergen 39: polymyxin B sulfate
Name Of Allergen 14: bisphenol A epoxy resin
Name Of Allergen 31: hydrocortisone-17-butyrate
Name Of Allergen 15: carba mix
Name Of Allergen 10: balsam of peru
Name Of Allergen 26: benzocaine
Name Of Allergen 58: cocunut diethanolamide
Number Of Patches Read: 80
Name Of Allergen 47: lavandula angustifolia oil
Name Of Allergen 36: lidocaine-hci

## 2021-11-17 NOTE — HPI: TESTING (PATCH TESTING)
Has Your Rash Been Biopsied Before?: has been biopsied previously
Have You Had Previous Patch Testing In The Past?: none
How Severe Is Your Rash At Its Worst?: severe
Who Is Your Referring Doctor?: Julia

## 2021-11-19 ENCOUNTER — APPOINTMENT (RX ONLY)
Dept: URBAN - METROPOLITAN AREA CLINIC 4 | Facility: CLINIC | Age: 76
Setting detail: DERMATOLOGY
End: 2021-11-19

## 2021-11-19 DIAGNOSIS — L259 CONTACT DERMATITIS AND OTHER ECZEMA, UNSPECIFIED CAUSE: ICD-10-CM

## 2021-11-19 PROBLEM — L30.9 DERMATITIS, UNSPECIFIED: Status: ACTIVE | Noted: 2021-11-19

## 2021-11-19 PROCEDURE — 99214 OFFICE O/P EST MOD 30 MIN: CPT

## 2021-11-19 PROCEDURE — ? ORDER TESTS

## 2021-11-19 PROCEDURE — ? ADDITIONAL NOTES

## 2021-11-19 PROCEDURE — ? CORE ACDS PATCH TEST READING

## 2021-11-19 NOTE — PROCEDURE: CORE ACDS PATCH TEST READING
Allergen 17 Reaction: no reaction
Name Of Allergen 67: sorbitan sesquioleate
Number Of Patches Read: 80
Name Of Allergen 8: paraben mix B
Name Of Allergen 71: triamcinalone acetonide
Show Negative Results In The Note?: Yes
Name Of Allergen 13: petrolatum-tert- butylphenol formaldehyde resin
Name Of Allergen 27: tixocortol-21-pivalate
Name Of Allergen 46: methyl methacrylate
Name Of Allergen 37: propylene glycol
Name Of Allergen 73: amidoamine
Detail Level: Zone
Name Of Allergen 42: dimethylaminopropylamine
Name Of Allergen 43: 2-hydroxyethyl-methacrylate
Name Of Allergen 66: dimethylol dihydroxyethyleneurea
Name Of Allergen 75: phenoxyethanol
Name Of Allergen 17: methylchloroisothiazolinone/methylisothiazolinone
Name Of Allergen 18: quaternium 15
Name Of Allergen 1: nickel sulfate hexahydrate
Name Of Allergen 70: ethyl hexyl glycerol
Name Of Allergen 6: fragrance mix
Name Of Allergen 51: tea tree oil, oxidized
Name Of Allergen 50: ethyl acrylate
Name Of Allergen 5: DMDM hydantoin
Name Of Allergen 14: bisphenol A epoxy resin
Name Of Allergen 61: benzophenone chloride
Name Of Allergen 32: 2-mercaptobenzothiazole
Name Of Allergen 78: butylhydroxytoluene
Name Of Allergen 31: hydrocortisone-17-butyrate
Name Of Allergen 23: 2-bromo-2-nitropropane-1,3-diol
Name Of Allergen 35: disperse blue mix 124/106
Name Of Allergen 59: 4-chloro-3-cresol
Name Of Allergen 52: chlorhexidine diclugonate
Name Of Allergen 58: cocunut diethanolamide
Name Of Allergen 25: diazolidinyl urea
Name Of Allergen 62: sodium benzoate
Name Of Allergen 12: cobalt 2 chloride hexahydrate
Name Of Allergen 7: colophony
Name Of Allergen 16: black rubber mix
Name Of Allergen 55: 2-hydroxy-4-methoxybenzophenone
Name Of Allergen 24: thiuram mix A
Name Of Allergen 53: propolis
Name Of Allergen 30: budesonide
Name Of Allergen 19: methyldibromo glutaronitrile
Name Of Allergen 80: benzyl alcohol
Name Of Allergen 29: imidazolidinyl urea
Name Of Allergen 76: disperse orange-3
Name Of Allergen 22: mercapto mix A
What Reading Time Point?: 168 hour
Name Of Allergen 65: compositae mix
Name Of Allergen 21: formaldehyde
Name Of Allergen 39: polymyxin B sulfate
Name Of Allergen 20: petrolatum-phenylenediamine
Name Of Allergen 36: lidocaine-hci
Name Of Allergen 56: tosylamide formaldehyde resin
Name Of Allergen 4: potassium dichromate
Name Of Allergen 60: benzalkonium chloride
Name Of Allergen 15: carba mix
Name Of Allergen 77: benzoic acid
Name Of Allergen 28: gold sodium thiosulfate
Name Of Allergen 26: benzocaine
Name Of Allergen 57: sesquiterpenelactone mix
Name Of Allergen 10: balsam of peru
Name Of Allergen 9: methylisothiazolinone
Name Of Allergen 74: ethyl cyanoacrylate
Negative Allergen Statement (Optional- But Will Override Documented Negatives): Angry back reaction (see photos)
Name Of Allergen 33: bacitracin
Name Of Allergen 69: cetylstearyalcohol
Name Of Allergen 79: 2-ethylhexyl-4-methoxycinnamate
Name Of Allergen 11: ethylenediamine dihydrochloride
Name Of Allergen 41: mixed dialkyl thioureas
Name Of Allergen 40: cocamidopropyl betaine
Name Of Allergen 63: sorbic acid
Name Of Allergen 34: fragrance mix 2
Name Of Allergen 49: dl alpha tocopherol
Name Of Allergen 38: iodopropyynyl betaine
Name Of Allergen 54: 4-chloro-3,5-xylenol
Name Of Allergen 48: cinnamic aldehyde
Name Of Allergen 44: oleamidopropyl dimethylamine
Name Of Allergen 64: cananga odorata
Name Of Allergen 45: decyl glucoside
Name Of Allergen 72: clobetasol-17-propionate
Name Of Allergen 3: neomycin sulfate
Name Of Allergen 68: 1,3-diphenylguanidine
Name Of Allergen 47: lavandula angustifolia oil
Name Of Allergen 2: amerchol L101

## 2021-11-19 NOTE — PROCEDURE: ADDITIONAL NOTES
Detail Level: Simple
Additional Notes: Nonspecific angry back reaction with patch testing\\nLong discussion today re using only hypoallergenic / fragrance free products, white cotton undershirts. \\nIf this is an eczematous drug eruption, losartan is most likely culprit; he will discuss with his primary care provider and attempt to identify an alternative antihypertensive\\nIf hypoallergenic products and medication change not effective we will initiate low dose methotrexate 12.5mg at the time of his 3 month f/u; baseline labs pending; mtx handout provided; pt willing to discontinue drinking alcohol
Render Risk Assessment In Note?: yes

## 2021-11-19 NOTE — PROCEDURE: ORDER TESTS
Bill For Surgical Tray: no
Expected Date Of Service: 11/19/2021
Performing Laboratory: 0
Billing Type: Third-Party Bill

## 2021-11-19 NOTE — HPI: TESTING (PATCH TESTING READING, DISCUSSION OF RESULTS)
How Severe Is Your Rash?: moderate
What Patch Testing Have You Undergone?: Core ACDS Recommended Series
What Reading Is This?: 144 hour patch test reading

## 2022-01-06 ENCOUNTER — RX ONLY (OUTPATIENT)
Age: 77
Setting detail: RX ONLY
End: 2022-01-06

## 2022-01-06 RX ORDER — TRIAMCINOLONE ACETONIDE 1 MG/G
CREAM TOPICAL
Qty: 454 | Refills: 1 | Status: ERX

## 2022-02-09 ENCOUNTER — APPOINTMENT (RX ONLY)
Dept: URBAN - METROPOLITAN AREA CLINIC 4 | Facility: CLINIC | Age: 77
Setting detail: DERMATOLOGY
End: 2022-02-09

## 2022-02-09 DIAGNOSIS — L30.9 DERMATITIS, UNSPECIFIED: ICD-10-CM | Status: WORSENING

## 2022-02-09 PROCEDURE — 96372 THER/PROPH/DIAG INJ SC/IM: CPT | Mod: 59

## 2022-02-09 PROCEDURE — 11103 TANGNTL BX SKIN EA SEP/ADDL: CPT

## 2022-02-09 PROCEDURE — ? SEPARATE AND IDENTIFIABLE DOCUMENTATION

## 2022-02-09 PROCEDURE — ? BIOPSY BY SHAVE METHOD

## 2022-02-09 PROCEDURE — 11102 TANGNTL BX SKIN SINGLE LES: CPT

## 2022-02-09 PROCEDURE — 99214 OFFICE O/P EST MOD 30 MIN: CPT | Mod: 25

## 2022-02-09 PROCEDURE — ? ORDER TESTS

## 2022-02-09 PROCEDURE — ? INTRAMUSCULAR KENALOG

## 2022-02-09 PROCEDURE — ? PRESCRIPTION

## 2022-02-09 RX ORDER — TRIAMCINOLONE ACETONIDE 1 MG/G
CREAM TOPICAL BID
Qty: 454 | Refills: 1 | Status: ERX

## 2022-02-09 ASSESSMENT — LOCATION DETAILED DESCRIPTION DERM
LOCATION DETAILED: LEFT SUPERIOR MEDIAL UPPER BACK
LOCATION DETAILED: RIGHT BUTTOCK
LOCATION DETAILED: RIGHT LATERAL ABDOMEN

## 2022-02-09 ASSESSMENT — LOCATION ZONE DERM: LOCATION ZONE: TRUNK

## 2022-02-09 ASSESSMENT — LOCATION SIMPLE DESCRIPTION DERM
LOCATION SIMPLE: RIGHT BUTTOCK
LOCATION SIMPLE: LEFT UPPER BACK
LOCATION SIMPLE: ABDOMEN

## 2022-02-09 NOTE — PROCEDURE: INTRAMUSCULAR KENALOG
Detail Level: None
Lot # (Optional): [id number]
Administered By (Optional): Amber Joe
Expiration Date (Optional): feb2023
Total Volume (Ccs): 1
Concentration (Mg/Ml) Of Additional Medication: 2.5
Add Option For Additional Mediation: No
Kenalog Preparation: kenalog
Concentration (Mg/Ml): 40.0
Consent: The risks of atrophy were reviewed with the patient.

## 2022-02-09 NOTE — PROCEDURE: ORDER TESTS
Performing Laboratory: 0
Expected Date Of Service: 02/09/2022
Billing Type: Third-Party Bill
Bill For Surgical Tray: no

## 2022-02-09 NOTE — PROCEDURE: BIOPSY BY SHAVE METHOD
Detail Level: Detailed
Depth Of Biopsy: dermis
Was A Bandage Applied: Yes
Size Of Lesion In Cm: 0
Biopsy Type: H and E
Biopsy Method: Personna blade
Anesthesia Type: 1% lidocaine with epinephrine
Anesthesia Volume In Cc: 0.5
Hemostasis: Drysol
Wound Care: Petrolatum
Dressing: bandage
Destruction After The Procedure: No
Type Of Destruction Used: Curettage
Curettage Text: The wound bed was treated with curettage after the biopsy was performed.
Cryotherapy Text: The wound bed was treated with cryotherapy after the biopsy was performed.
Electrodesiccation Text: The wound bed was treated with electrodesiccation after the biopsy was performed.
Electrodesiccation And Curettage Text: The wound bed was treated with electrodesiccation and curettage after the biopsy was performed.
Silver Nitrate Text: The wound bed was treated with silver nitrate after the biopsy was performed.
Consent: Written consent was obtained and risks were reviewed including but not limited to scarring, infection, bleeding, scabbing, incomplete removal, nerve damage and allergy to anesthesia.
Post-Care Instructions: I reviewed with the patient in detail post-care instructions. Patient is to keep the biopsy site dry overnight, and then apply bacitracin twice daily until healed. Patient may apply hydrogen peroxide soaks to remove any crusting.
Notification Instructions: Patient will be notified of biopsy results. However, patient instructed to call the office if not contacted within 2 weeks.
Billing Type: Third-Party Bill
Information: Selecting Yes will display possible errors in your note based on the variables you have selected. This validation is only offered as a suggestion for you. PLEASE NOTE THAT THE VALIDATION TEXT WILL BE REMOVED WHEN YOU FINALIZE YOUR NOTE. IF YOU WANT TO FAX A PRELIMINARY NOTE YOU WILL NEED TO TOGGLE THIS TO 'NO' IF YOU DO NOT WANT IT IN YOUR FAXED NOTE.
Biopsy Type: DIF

## 2022-02-09 NOTE — HPI: RASH
What Type Of Note Output Would You Prefer (Optional)?: Standard Output
Is The Patient Presenting As Previously Scheduled?: No, they are a work-in
How Severe Is Your Rash?: severe
Is This A New Presentation, Or A Follow-Up?: Follow Up Rash

## 2022-03-09 ENCOUNTER — APPOINTMENT (RX ONLY)
Dept: URBAN - METROPOLITAN AREA CLINIC 4 | Facility: CLINIC | Age: 77
Setting detail: DERMATOLOGY
End: 2022-03-09

## 2022-03-09 DIAGNOSIS — L30.9 DERMATITIS, UNSPECIFIED: ICD-10-CM

## 2022-03-09 DIAGNOSIS — L20.89 OTHER ATOPIC DERMATITIS: ICD-10-CM | Status: WORSENING

## 2022-03-09 PROCEDURE — ? COUNSELING

## 2022-03-09 PROCEDURE — ? PRESCRIPTION

## 2022-03-09 PROCEDURE — ? DUPIXENT INITIATION

## 2022-03-09 PROCEDURE — 99214 OFFICE O/P EST MOD 30 MIN: CPT

## 2022-03-09 PROCEDURE — ? TREATMENT REGIMEN

## 2022-03-09 PROCEDURE — ? ADDITIONAL NOTES

## 2022-03-09 RX ORDER — PREDNISONE 10 MG/1
TABLET ORAL
Qty: 60 | Refills: 0 | Status: ERX | COMMUNITY
Start: 2022-03-09

## 2022-03-09 RX ORDER — DUPILUMAB 300 MG/2ML
INJECTION, SOLUTION SUBCUTANEOUS
Qty: 2 | Refills: 11 | Status: ERX | COMMUNITY
Start: 2022-03-09

## 2022-03-09 RX ORDER — CLOBETASOL PROPIONATE 0.5 MG/G
CREAM TOPICAL
Qty: 60 | Refills: 3 | Status: ERX

## 2022-03-09 RX ADMIN — DUPILUMAB 1: 300 INJECTION, SOLUTION SUBCUTANEOUS at 00:00

## 2022-03-09 RX ADMIN — PREDNISONE 1: 10 TABLET ORAL at 00:00

## 2022-03-09 ASSESSMENT — LOCATION ZONE DERM: LOCATION ZONE: TRUNK

## 2022-03-09 ASSESSMENT — LOCATION SIMPLE DESCRIPTION DERM: LOCATION SIMPLE: ABDOMEN

## 2022-03-09 ASSESSMENT — LOCATION DETAILED DESCRIPTION DERM: LOCATION DETAILED: PERIUMBILICAL SKIN

## 2022-03-09 NOTE — HPI: ECZEMA (PATIENT REPORTED)
Where Is Your Eczema Located?: Body throughout
List Prescription Topical Steroids You Are Currently Using (Separate Each Name With A Comma):: Clobetasol, triamcinalone

## 2022-03-09 NOTE — PROCEDURE: DUPIXENT INITIATION
Add Pregnancy And Lactation Warning To Medication Counseling?: No
Dupixent Monitoring Guidelines: There is no laboratory monitoring requirement with Dupixent.
Detail Level: Zone
Is Methotrexate Contraindicated?: Yes
Dupixent Dosing: 600 mg SC day 0 then 300 mg SC every other week
Diagnosis (Required): Atopic Dermatitis/Eczematous Dermatitis
Pregnancy And Lactation Warning Text: There have not been adverse fetal risks in women taking Dupixent while pregnant. It is unknown if this medication is excreted in breast milk.

## 2022-03-09 NOTE — PROCEDURE: ADDITIONAL NOTES
Detail Level: Simple
Render Risk Assessment In Note?: no
Additional Notes: Dupixent sample given \\nLot [id number]\\nExp 2023-09-30

## 2022-03-09 NOTE — PROCEDURE: TREATMENT REGIMEN
Detail Level: Zone
Plan: DIF x 2 negative for BP Abs; VIVIANA for  and 230 ordered from [facility]
Modify Regimen: Recommended vitamin d and calcium while on prednisone

## 2022-03-23 ENCOUNTER — APPOINTMENT (RX ONLY)
Dept: URBAN - METROPOLITAN AREA CLINIC 4 | Facility: CLINIC | Age: 77
Setting detail: DERMATOLOGY
End: 2022-03-23

## 2022-03-23 DIAGNOSIS — L12.0 BULLOUS PEMPHIGOID: ICD-10-CM

## 2022-03-23 DIAGNOSIS — L20.89 OTHER ATOPIC DERMATITIS: ICD-10-CM

## 2022-03-23 PROCEDURE — 99214 OFFICE O/P EST MOD 30 MIN: CPT

## 2022-03-23 PROCEDURE — ? COUNSELING

## 2022-03-23 PROCEDURE — ? TREATMENT REGIMEN

## 2022-03-23 PROCEDURE — ? PRESCRIPTION

## 2022-03-23 RX ORDER — PREDNISONE 5 MG/1
TABLET ORAL
Qty: 42 | Refills: 0 | Status: ERX | COMMUNITY
Start: 2022-03-23

## 2022-03-23 RX ADMIN — PREDNISONE: 5 TABLET ORAL at 00:00

## 2022-03-23 ASSESSMENT — LOCATION ZONE DERM: LOCATION ZONE: TRUNK

## 2022-03-23 ASSESSMENT — LOCATION DETAILED DESCRIPTION DERM
LOCATION DETAILED: EPIGASTRIC SKIN
LOCATION DETAILED: RIGHT CLAVICULAR SKIN

## 2022-03-23 ASSESSMENT — LOCATION SIMPLE DESCRIPTION DERM
LOCATION SIMPLE: ABDOMEN
LOCATION SIMPLE: RIGHT CLAVICULAR SKIN

## 2022-03-23 NOTE — PROCEDURE: TREATMENT REGIMEN
Samples Given: Dupixent sample given today and one sample injected into the left abdomen. Pt advised to contact [facility] for delivery of medication.
Continue Regimen: Dupixent 300mg pen every two weeks
Detail Level: Zone
Modify Regimen: Start to taper prednisone by 5mg starting next week
Continue Regimen: Triamcinolone

## 2022-04-20 ENCOUNTER — APPOINTMENT (RX ONLY)
Dept: URBAN - METROPOLITAN AREA CLINIC 4 | Facility: CLINIC | Age: 77
Setting detail: DERMATOLOGY
End: 2022-04-20

## 2022-04-20 DIAGNOSIS — L20.89 OTHER ATOPIC DERMATITIS: ICD-10-CM

## 2022-04-20 DIAGNOSIS — L12.0 BULLOUS PEMPHIGOID: ICD-10-CM

## 2022-04-20 PROCEDURE — ? COUNSELING

## 2022-04-20 PROCEDURE — 99214 OFFICE O/P EST MOD 30 MIN: CPT

## 2022-04-20 PROCEDURE — ? TREATMENT REGIMEN

## 2022-04-20 PROCEDURE — ? PRESCRIPTION

## 2022-04-20 RX ORDER — CLOBETASOL PROPIONATE 0.5 MG/G
OINTMENT TOPICAL
Qty: 60 | Refills: 6 | Status: ERX

## 2022-04-20 ASSESSMENT — LOCATION DETAILED DESCRIPTION DERM
LOCATION DETAILED: RIGHT CLAVICULAR SKIN
LOCATION DETAILED: EPIGASTRIC SKIN

## 2022-04-20 ASSESSMENT — LOCATION SIMPLE DESCRIPTION DERM
LOCATION SIMPLE: RIGHT CLAVICULAR SKIN
LOCATION SIMPLE: ABDOMEN

## 2022-04-20 ASSESSMENT — LOCATION ZONE DERM: LOCATION ZONE: TRUNK

## 2022-04-20 NOTE — PROCEDURE: TREATMENT REGIMEN
Samples Given: Dupixent sample given today and one sample injected into the left abdomen. Pt advised to contact [facility] for delivery of medication.
Modify Regimen: Clobetasol ointment
Continue Regimen: Dupixent 300mg pen every two weeks
Detail Level: Zone
Modify Regimen: Start to taper prednisone by 5mg starting next week
Continue Regimen: Triamcinolone

## 2022-07-21 NOTE — PROCEDURE: BIOPSY BY SHAVE METHOD
EMERGENCY DEPARTMENT HISTORY AND PHYSICAL EXAM      Date: 7/21/2022  Patient Name: Tessie Laboy    History of Presenting Illness     Chief Complaint   Patient presents with    Cough    Fever       History Provided By: Patient    HPI: Garima Ceballos, 5 m.o. female with a past medical history significant No significant past medical history presents to the ED with chief complaint of Cough and Fever  . 5month-old otherwise healthy. Patient was staying with other family members who are not ill. Child started with a fever last night. Was given Tylenol at 5 AM.  Lots of cough congestion. Low-grade fevers. Runny nose. Reported nausea vomiting or appetite change. No diarrhea. No known sick contacts although mom is feeling ill today. There are no other complaints, changes, or physical findings at this time. PCP: Unknown, Provider, [place]    Current Facility-Administered Medications   Medication Dose Route Frequency Provider Last Rate Last Admin    ibuprofen (ADVIL;MOTRIN) 100 mg/5 mL oral suspension 63.2 mg  10 mg/kg Oral Q6H PRN Anne-Marie Rouse MD   63.2 mg at 07/21/22 0750       Past History     Past Medical History: denies    Past Surgical History: denies    Family History:  Family History   Problem Relation Age of Onset    Anemia Mother         Copied from mother's history at birth    Diabetes Mother         Copied from mother's history at birth    Hypertension Mother         Copied from mother's history at birth       Social History: denies       Allergies:  No Known Allergies      Review of Systems   Review of Systems   Unable to perform ROS: Age     Physical Exam   Physical Exam  Constitutional:       General: She is active. Appearance: Normal appearance. She is well-developed. HENT:      Head: Normocephalic and atraumatic. Right Ear: There is impacted cerumen. Left Ear: Tympanic membrane normal.      Nose: Congestion and rhinorrhea present.
Mouth/Throat:      Mouth: Mucous membranes are moist.   Cardiovascular:      Rate and Rhythm: Normal rate. Pulses: Normal pulses. Pulmonary:      Effort: Pulmonary effort is normal.      Breath sounds: Normal breath sounds. Abdominal:      General: Abdomen is flat. Palpations: Abdomen is soft. Musculoskeletal:         General: Normal range of motion. Skin:     General: Skin is warm. Capillary Refill: Capillary refill takes less than 2 seconds. Turgor: Normal.   Neurological:      General: No focal deficit present. Mental Status: She is alert. Diagnostic Study Results     Labs -     Recent Results (from the past 12 hour(s))   RSV AG - RAPID    Collection Time: 07/21/22  7:40 AM   Result Value Ref Range    RSV Antigen Negative Negative     STREP AG SCREEN, GROUP A    Collection Time: 07/21/22  7:40 AM    Specimen: Throat   Result Value Ref Range    Group A Strep Ag ID Negative     INFLUENZA A & B AG (RAPID TEST)    Collection Time: 07/21/22  7:40 AM   Result Value Ref Range    Influenza A Antigen Negative Negative      Influenza B Antigen Negative Negative     COVID-19 RAPID TEST    Collection Time: 07/21/22  7:40 AM   Result Value Ref Range    COVID-19 rapid test DETECTED (A) Not Detected           Radiologic Studies -   XR CHEST PORT   Final Result   No evidence of acute cardiopulmonary process. CT Results  (Last 48 hours)      None          CXR Results  (Last 48 hours)                 07/21/22 0752  XR CHEST PORT Final result    Impression:  No evidence of acute cardiopulmonary process. Narrative:  INDICATION:  Cough        COMPARISON: December 24, 2021       FINDINGS: Single AP portable view of the chest obtained at 744 demonstrates a   stable cardiomediastinal silhouette. The lungs are clear bilaterally. No osseous   abnormalities are seen. Medical Decision Making and ED Course   I am the first provider for this patient.     I reviewed
the vital signs, available nursing notes, past medical history, past surgical history, family history and social history. Vital Signs-Reviewed the patient's vital signs. Patient Vitals for the past 12 hrs:   Temp Pulse Resp SpO2   07/21/22 0828 99 °F (37.2 °C) -- -- --   07/21/22 0621 100.2 °F (37.9 °C) 151 30 100 %       EKG interpretation:         Records Reviewed: Previous Hospital chart. EMS run report      ED Course:   Initial assessment performed. The patients presenting problems have been discussed, and they are in agreement with the care plan formulated and outlined with them. I have encouraged them to ask questions as they arise throughout their visit. Orders Placed This Encounter    RSV AG - RAPID     Standing Status:   Standing     Number of Occurrences:   1    STREP AG SCREEN, GROUP A     Standing Status:   Standing     Number of Occurrences:   1    INFLUENZA A & B AG (RAPID TEST)     Standing Status:   Standing     Number of Occurrences:   1    COVID-19 RAPID TEST     Standing Status:   Standing     Number of Occurrences:   1     Order Specific Question:   Is this test for diagnosis or screening? Answer:   Diagnosis of ill patient     Order Specific Question:   Symptomatic for COVID-19 as defined by CDC? Answer:   Yes     Order Specific Question:   Date of Symptom Onset     Answer:   7/21/2022     Order Specific Question:   Hospitalized for COVID-19? Answer:   No     Order Specific Question:   Admitted to ICU for COVID-19? Answer:   No     Order Specific Question:   Employed in healthcare setting? Answer:   No     Order Specific Question:   Resident in a congregate (group) care setting? Answer:   No     Order Specific Question:   Pregnant? Answer:   No     Order Specific Question:   Previously tested for COVID-19?      Answer:   Unknown    XR CHEST PORT     Standing Status:   Standing     Number of Occurrences:   1     Order Specific Question:   Reason for Exam
Answer:   [facility]    ibuprofen (ADVIL;MOTRIN) 100 mg/5 mL oral suspension 63.2 mg                 Provider Notes (Medical Decision Making):   5month-old female otherwise healthy with URI symptoms. Family with similar. Vitals are stable. Respiratory rate has come down once temperature has come down. Tolerating p.o. well in no distress. Patient is COVID-19 positive. Stable for outpatient symptomatic treatment. Consults              Discharged    Procedures               Disposition       Emergency Department Disposition:  Discharged      Diagnosis     Clinical Impression:   1. COVID-19 virus infection    2. Viral upper respiratory tract infection    3. Fever, unspecified fever cause        Attestations:    Paulo España MD    Please note that this dictation was completed with Vis[employer]rise, the [employer] voice recognition software. Quite often unanticipated grammatical, syntax, homophones, and other interpretive errors are inadvertently transcribed by the computer software. Please disregard these errors. Please excuse any errors that have escaped final proofreading. Thank you.
Detail Level: Detailed
Depth Of Biopsy: dermis
Was A Bandage Applied: Yes
Size Of Lesion In Cm: 0
Biopsy Type: H and E
Biopsy Method: Personna blade
Anesthesia Type: 1% lidocaine with epinephrine
Anesthesia Volume In Cc: 0.5
Hemostasis: Drysol
Wound Care: Petrolatum
Dressing: bandage
Destruction After The Procedure: No
Type Of Destruction Used: Curettage
Curettage Text: The wound bed was treated with curettage after the biopsy was performed.
Cryotherapy Text: The wound bed was treated with cryotherapy after the biopsy was performed.
Electrodesiccation Text: The wound bed was treated with electrodesiccation after the biopsy was performed.
Electrodesiccation And Curettage Text: The wound bed was treated with electrodesiccation and curettage after the biopsy was performed.
Silver Nitrate Text: The wound bed was treated with silver nitrate after the biopsy was performed.
Lab: [id number]
Lab Facility: 
Consent: Written consent was obtained and risks were reviewed including but not limited to scarring, infection, bleeding, scabbing, incomplete removal, nerve damage and allergy to anesthesia.
Post-Care Instructions: I reviewed with the patient in detail post-care instructions. Patient is to keep the biopsy site dry overnight, and then apply bacitracin twice daily until healed. Patient may apply hydrogen peroxide soaks to remove any crusting.
Notification Instructions: Patient will be notified of biopsy results. However, patient instructed to call the office if not contacted within 2 weeks.
Billing Type: Third-Party Bill
Information: Selecting Yes will display possible errors in your note based on the variables you have selected. This validation is only offered as a suggestion for you. PLEASE NOTE THAT THE VALIDATION TEXT WILL BE REMOVED WHEN YOU FINALIZE YOUR NOTE. IF YOU WANT TO FAX A PRELIMINARY NOTE YOU WILL NEED TO TOGGLE THIS TO 'NO' IF YOU DO NOT WANT IT IN YOUR FAXED NOTE.

## 2022-11-30 ENCOUNTER — RX ONLY (OUTPATIENT)
Age: 77
Setting detail: RX ONLY
End: 2022-11-30

## 2022-11-30 RX ORDER — DUPILUMAB 300 MG/2ML
INJECTION, SOLUTION SUBCUTANEOUS
Qty: 1 | Refills: 6 | Status: ERX

## 2023-08-28 RX ORDER — CLOBETASOL PROPIONATE 0.5 MG/G
OINTMENT TOPICAL
Qty: 60 | Refills: 6 | Status: ERX

## 2023-09-15 ENCOUNTER — APPOINTMENT (RX ONLY)
Dept: URBAN - METROPOLITAN AREA CLINIC 15 | Facility: CLINIC | Age: 78
Setting detail: DERMATOLOGY
End: 2023-09-15

## 2023-09-15 DIAGNOSIS — L12.0 BULLOUS PEMPHIGOID: ICD-10-CM

## 2023-09-15 PROBLEM — D48.5 NEOPLASM OF UNCERTAIN BEHAVIOR OF SKIN: Status: ACTIVE | Noted: 2023-09-15

## 2023-09-15 PROCEDURE — 11103 TANGNTL BX SKIN EA SEP/ADDL: CPT

## 2023-09-15 PROCEDURE — ? PRESCRIPTION

## 2023-09-15 PROCEDURE — 99213 OFFICE O/P EST LOW 20 MIN: CPT | Mod: 25

## 2023-09-15 PROCEDURE — 11102 TANGNTL BX SKIN SINGLE LES: CPT

## 2023-09-15 PROCEDURE — ? COUNSELING

## 2023-09-15 PROCEDURE — ? BIOPSY BY SHAVE METHOD

## 2023-09-15 RX ORDER — CLOBETASOL PROPIONATE 0.5 MG/G
OINTMENT TOPICAL
Qty: 60 | Refills: 2 | Status: ERX

## 2023-09-15 NOTE — PROCEDURE: BIOPSY BY SHAVE METHOD
Detail Level: Detailed
Depth Of Biopsy: dermis
Was A Bandage Applied: Yes
Size Of Lesion In Cm: 0
Biopsy Type: H and E
Biopsy Method: Personna blade
Anesthesia Type: 0.5% lidocaine without epinephrine
Anesthesia Volume In Cc: 1
Hemostasis: Aluminum Chloride
Wound Care: Petrolatum
Dressing: pressure dressing with telfa
Destruction After The Procedure: No
Type Of Destruction Used: Curettage
Curettage Text: The wound bed was treated with curettage after the biopsy was performed.
Cryotherapy Text: The wound bed was treated with cryotherapy after the biopsy was performed.
Electrodesiccation Text: The wound bed was treated with electrodesiccation after the biopsy was performed.
Electrodesiccation And Curettage Text: The wound bed was treated with electrodesiccation and curettage after the biopsy was performed.
Silver Nitrate Text: The wound bed was treated with silver nitrate after the biopsy was performed.
Lab: [id number]
Lab Facility: 
Consent: Written consent was obtained and risks were reviewed including but not limited to scarring, infection, bleeding, scabbing, incomplete removal, nerve damage and allergy to anesthesia.
Post-Care Instructions: I reviewed with the patient in detail post-care instructions. Patient is to keep the biopsy site dry overnight. Gentle cleansing daily.  Apply petroleum ointment daily until healed. Patient may apply hydrogen peroxide soaks to remove any crusting.
Notification Instructions: Patient will be notified of biopsy results. However, patient instructed to call the office if not contacted within 2 weeks.
Billing Type: Third-Party Bill
Information: Selecting Yes will display possible errors in your note based on the variables you have selected. This validation is only offered as a suggestion for you. PLEASE NOTE THAT THE VALIDATION TEXT WILL BE REMOVED WHEN YOU FINALIZE YOUR NOTE. IF YOU WANT TO FAX A PRELIMINARY NOTE YOU WILL NEED TO TOGGLE THIS TO 'NO' IF YOU DO NOT WANT IT IN YOUR FAXED NOTE.
Size Of Lesion In Cm: 1.2

## 2023-09-15 NOTE — HPI: BLISTERS (BULLOUS PEMPHIGOID)
How Severe Is It?: mild
Is This A New Presentation, Or A Follow-Up?: Follow Up Bullous Pemphigoid
Additional History: Requesting refill of clobetasol

## 2023-09-27 ENCOUNTER — APPOINTMENT (RX ONLY)
Dept: URBAN - METROPOLITAN AREA CLINIC 4 | Facility: CLINIC | Age: 78
Setting detail: DERMATOLOGY
End: 2023-09-27

## 2023-09-27 PROBLEM — C43.4 MALIGNANT MELANOMA OF SCALP AND NECK: Status: ACTIVE | Noted: 2023-09-27

## 2023-09-27 PROCEDURE — 13133 CMPLX RPR F/C/C/M/N/AX/G/H/F: CPT

## 2023-09-27 PROCEDURE — 13132 CMPLX RPR F/C/C/M/N/AX/G/H/F: CPT

## 2023-09-27 PROCEDURE — ? DIAGNOSIS COMMENT

## 2023-09-27 PROCEDURE — 11626 EXC S/N/H/F/G MAL+MRG >4 CM: CPT

## 2023-09-27 PROCEDURE — ? EXCISION

## 2023-09-27 PROCEDURE — ? COUNSELING

## 2023-09-27 NOTE — PROCEDURE: COUNSELING
When Should The Patient Follow-Up For Their Next Full-Body Skin Exam?: 3 Months
Quality 138: Melanoma: Coordination Of Care: A treatment plan was communicated to the physicians providing continuing care within one month of diagnosis outlining: diagnosis, tumor thickness and a plan for surgery or alternate care.
Quality 137: Melanoma: Continuity Of Care - Recall System: Patient information entered into a recall system that includes: target date for the next exam specified AND a process to follow up with patients regarding missed or unscheduled appointments
Quality 397: Melanoma: Reporting: Pathology report includes the pT Category, thickness, ulceration and mitotic rate, peripheral and deep margin status and presence or absence of microsatellitosis for invasive tumors.
Detail Level: Detailed
Show Follow-Up Variable: Yes

## 2023-09-27 NOTE — PROCEDURE: MIPS QUALITY
Quality 226: Preventive Care And Screening: Tobacco Use: Screening And Cessation Intervention: Patient screened for tobacco use and is an ex/non-smoker
Quality 138: Melanoma: Coordination Of Care: A treatment plan was communicated to the physicians providing continuing care within one month of diagnosis outlining: diagnosis, tumor thickness and a plan for surgery or alternate care.
Quality 130: Documentation Of Current Medications In The Medical Record: Current Medications Documented
Detail Level: Detailed
Quality 137: Melanoma: Continuity Of Care - Recall System: Patient information entered into a recall system that includes: target date for the next exam specified AND a process to follow up with patients regarding missed or unscheduled appointments

## 2023-09-27 NOTE — PROCEDURE: EXCISION
Surgeon (Optional): Gigi
Biopsy Photograph Reviewed: Yes
Previous Accession (Optional): [id number]
Breslow Depth: 0.3
Size Of Lesion In Cm: 2.6
X Size Of Lesion In Cm (Optional): 0
Size Of Margin In Cm: 1
Anesthesia Volume In Cc: 12
Was An Eye Clamp Used?: No
Eye Clamp Note Details: An eye clamp was used during the procedure.
Excision Method: Elliptical
Saucerization Depth: dermis and superficial adipose tissue
Repair Type: Complex
Suturegard Retention Suture: 2-0 Nylon
Retention Suture Bite Size: 3 mm
Length To Time In Minutes Device Was In Place: 10
Intermediate / Complex Repair - Final Wound Length In Cm: 10.3
Undermining Type: Entire Wound
Debridement Text: The wound edges were debrided prior to proceeding with the closure to facilitate wound healing.
Helical Rim Text: The closure involved the helical rim.
Vermilion Border Text: The closure involved the vermilion border.
Nostril Rim Text: The closure involved the nostril rim.
Retention Suture Text: Retention sutures were placed to support the closure and prevent dehiscence.
Lab: [id number]
Lab Facility: 
Graft Donor Site Bandage (Optional-Leave Blank If You Don't Want In Note): Steri-strips and a pressure bandage were applied to the donor site.
Epidermal Closure Graft Donor Site (Optional): simple interrupted
Billing Type: Third-Party Bill
Excision Depth: adipose tissue
Scalpel Size: 15 blade
Anesthesia Type: 1% lidocaine with epinephrine
Additional Anesthesia Volume In Cc: 6
Hemostasis: Electrocautery
Estimated Blood Loss (Cc): minimal
Detail Level: Detailed
Repair Anesthesia Method: local infiltration
Anesthesia Volume In Cc: 23.7
Deep Sutures: 2-0 Vicryl
Dermal Closure: buried vertical mattress
Epidermal Sutures: 5-0 Vicryl Rapide
Epidermal Closure: running subcuticular
Wound Care: Bacitracin
Dressing: dry sterile dressing
Suturegard Intro: Intraoperative tissue expansion was performed, utilizing the SUTUREGARD device, in order to reduce wound tension.
Suturegard Body: The suture ends were repeatedly re-tightened and re-clamped to achieve the desired tissue expansion.
Hemigard Intro: Due to skin fragility and wound tension, it was decided to use HEMIGARD adhesive retention suture devices to permit a linear closure. The skin was cleaned and dried for a 6cm distance away from the wound. Excessive hair, if present, was removed to allow for adhesion.
Hemigard Postcare Instructions: The HEMIGARD strips are to remain completely dry for at least 5-7 days.
Positioning (Leave Blank If You Do Not Want): The patient was placed in a comfortable position exposing the surgical site.
Pre-Excision Curettage Text (Leave Blank If You Do Not Want): Prior to drawing the surgical margin the visible lesion was removed with electrodesiccation and curettage to clearly define the lesion size.
Complex Repair Preamble Text (Leave Blank If You Do Not Want): Extensive wide undermining was performed.
Intermediate Repair Preamble Text (Leave Blank If You Do Not Want): Undermining was performed with blunt dissection.
Curvilinear Excision Additional Text (Leave Blank If You Do Not Want): The margin was drawn around the clinically apparent lesion.  A curvilinear shape was then drawn on the skin incorporating the lesion and margins.  Incisions were then made along these lines to the appropriate tissue plane and the lesion was extirpated.
Fusiform Excision Additional Text (Leave Blank If You Do Not Want): The margin was drawn around the clinically apparent lesion.  A fusiform shape was then drawn on the skin incorporating the lesion and margins.  Incisions were then made along these lines to the appropriate tissue plane and the lesion was extirpated.
Elliptical Excision Additional Text (Leave Blank If You Do Not Want): The margin was drawn around the clinically apparent lesion.  An elliptical shape was then drawn on the skin incorporating the lesion and margins.  Incisions were then made along these lines to the appropriate tissue plane and the lesion was extirpated.
Saucerization Excision Additional Text (Leave Blank If You Do Not Want): The margin was drawn around the clinically apparent lesion.  Incisions were then made along these lines, in a tangential fashion, to the appropriate tissue plane and the lesion was extirpated.
Slit Excision Additional Text (Leave Blank If You Do Not Want): A linear line was drawn on the skin overlying the lesion. An incision was made slowly until the lesion was visualized.  Once visualized, the lesion was removed with blunt dissection.
Excisional Biopsy Additional Text (Leave Blank If You Do Not Want): The margin was drawn around the clinically apparent lesion. An elliptical shape was then drawn on the skin incorporating the lesion and margins.  Incisions were then made along these lines to the appropriate tissue plane and the lesion was extirpated.
Perilesional Excision Additional Text (Leave Blank If You Do Not Want): The margin was drawn around the clinically apparent lesion. Incisions were then made along these lines to the appropriate tissue plane and the lesion was extirpated.
Repair Performed By Another Provider Text (Leave Blank If You Do Not Want): After the tissue was excised the defect was repaired by another provider.
No Repair - Repaired With Adjacent Surgical Defect Text (Leave Blank If You Do Not Want): After the excision the defect was repaired concurrently with another surgical defect which was in close approximation.
Adjacent Tissue Transfer Text: The defect edges were debeveled with a #15 scalpel blade. Given the location of the defect and the proximity to free margins an adjacent tissue transfer was deemed most appropriate. Using a sterile surgical marker, an appropriate flap was drawn incorporating the defect and placing the expected incisions within the relaxed skin tension lines where possible. The area thus outlined was incised deep to adipose tissue with a #15 scalpel blade. The skin margins were undermined to an appropriate distance in all directions utilizing iris scissors and carried over to close the primary defect.
Advancement Flap (Single) Text: The defect edges were debeveled with a #15 scalpel blade.  Given the location of the defect and the proximity to free margins a single advancement flap was deemed most appropriate.  Using a sterile surgical marker, an appropriate advancement flap was drawn incorporating the defect and placing the expected incisions within the relaxed skin tension lines where possible.    The area thus outlined was incised deep to adipose tissue with a #15 scalpel blade.  The skin margins were undermined to an appropriate distance in all directions utilizing iris scissors.
Advancement Flap (Double) Text: The defect edges were debeveled with a #15 scalpel blade.  Given the location of the defect and the proximity to free margins a double advancement flap was deemed most appropriate.  Using a sterile surgical marker, the appropriate advancement flaps were drawn incorporating the defect and placing the expected incisions within the relaxed skin tension lines where possible.    The area thus outlined was incised deep to adipose tissue with a #15 scalpel blade.  The skin margins were undermined to an appropriate distance in all directions utilizing iris scissors.
Burow's Advancement Flap Text: The defect edges were debeveled with a #15 scalpel blade.  Given the location of the defect and the proximity to free margins a Burow's advancement flap was deemed most appropriate.  Using a sterile surgical marker, the appropriate advancement flap was drawn incorporating the defect and placing the expected incisions within the relaxed skin tension lines where possible.    The area thus outlined was incised deep to adipose tissue with a #15 scalpel blade.  The skin margins were undermined to an appropriate distance in all directions utilizing iris scissors.
Chonodrocutaneous Helical Advancement Flap Text: The defect edges were debeveled with a #15 scalpel blade. Given the location of the defect and the proximity to free margins a chondrocutaneous helical advancement flap was deemed most appropriate. Using a sterile surgical marker, the appropriate advancement flap was drawn incorporating the defect and placing the expected incisions within the relaxed skin tension lines where possible. The area thus outlined was incised deep to adipose tissue with a #15 scalpel blade. The skin margins were undermined to an appropriate distance in all directions utilizing iris scissors. Following this, the designed flap was advanced and carried over into the primary defect and sutured into place.
Crescentic Advancement Flap Text: The defect edges were debeveled with a #15 scalpel blade.  Given the location of the defect and the proximity to free margins a crescentic advancement flap was deemed most appropriate.  Using a sterile surgical marker, the appropriate advancement flap was drawn incorporating the defect and placing the expected incisions within the relaxed skin tension lines where possible.    The area thus outlined was incised deep to adipose tissue with a #15 scalpel blade.  The skin margins were undermined to an appropriate distance in all directions utilizing iris scissors.
A-T Advancement Flap Text: The defect edges were debeveled with a #15 scalpel blade.  Given the location of the defect, shape of the defect and the proximity to free margins an A-T advancement flap was deemed most appropriate.  Using a sterile surgical marker, an appropriate advancement flap was drawn incorporating the defect and placing the expected incisions within the relaxed skin tension lines where possible.    The area thus outlined was incised deep to adipose tissue with a #15 scalpel blade.  The skin margins were undermined to an appropriate distance in all directions utilizing iris scissors.
O-T Advancement Flap Text: The defect edges were debeveled with a #15 scalpel blade.  Given the location of the defect, shape of the defect and the proximity to free margins an O-T advancement flap was deemed most appropriate.  Using a sterile surgical marker, an appropriate advancement flap was drawn incorporating the defect and placing the expected incisions within the relaxed skin tension lines where possible.    The area thus outlined was incised deep to adipose tissue with a #15 scalpel blade.  The skin margins were undermined to an appropriate distance in all directions utilizing iris scissors.
O-L Flap Text: The defect edges were debeveled with a #15 scalpel blade.  Given the location of the defect, shape of the defect and the proximity to free margins an O-L flap was deemed most appropriate.  Using a sterile surgical marker, an appropriate advancement flap was drawn incorporating the defect and placing the expected incisions within the relaxed skin tension lines where possible.    The area thus outlined was incised deep to adipose tissue with a #15 scalpel blade.  The skin margins were undermined to an appropriate distance in all directions utilizing iris scissors.
O-Z Flap Text: The defect edges were debeveled with a #15 scalpel blade. Given the location of the defect, shape of the defect and the proximity to free margins an O-Z flap was deemed most appropriate. Using a sterile surgical marker, an appropriate transposition flap was drawn incorporating the defect and placing the expected incisions within the relaxed skin tension lines where possible. The area thus outlined was incised deep to adipose tissue with a #15 scalpel blade. The skin margins were undermined to an appropriate distance in all directions utilizing iris scissors. Following this, the designed flap was carried over into the primary defect and sutured into place.
Double O-Z Flap Text: The defect edges were debeveled with a #15 scalpel blade. Given the location of the defect, shape of the defect and the proximity to free margins a Double O-Z flap was deemed most appropriate. Using a sterile surgical marker, an appropriate transposition flap was drawn incorporating the defect and placing the expected incisions within the relaxed skin tension lines where possible. The area thus outlined was incised deep to adipose tissue with a #15 scalpel blade. The skin margins were undermined to an appropriate distance in all directions utilizing iris scissors. Following this, the designed flap was carried over into the primary defect and sutured into place.
V-Y Flap Text: The defect edges were debeveled with a #15 scalpel blade.  Given the location of the defect, shape of the defect and the proximity to free margins a V-Y flap was deemed most appropriate.  Using a sterile surgical marker, an appropriate advancement flap was drawn incorporating the defect and placing the expected incisions within the relaxed skin tension lines where possible.    The area thus outlined was incised deep to adipose tissue with a #15 scalpel blade.  The skin margins were undermined to an appropriate distance in all directions utilizing iris scissors.
Advancement-Rotation Flap Text: The defect edges were debeveled with a #15 scalpel blade.  Given the location of the defect, shape of the defect and the proximity to free margins an advancement-rotation flap was deemed most appropriate.  Using a sterile surgical marker, an appropriate flap was drawn incorporating the defect and placing the expected incisions within the relaxed skin tension lines where possible. The area thus outlined was incised deep to adipose tissue with a #15 scalpel blade.  The skin margins were undermined to an appropriate distance in all directions utilizing iris scissors.
Mercedes Flap Text: The defect edges were debeveled with a #15 scalpel blade. Given the location of the defect, shape of the defect and the proximity to free margins a Mercedes flap was deemed most appropriate. Using a sterile surgical marker, an appropriate advancement flap was drawn incorporating the defect and placing the expected incisions within the relaxed skin tension lines where possible. The area thus outlined was incised deep to adipose tissue with a #15 scalpel blade. The skin margins were undermined to an appropriate distance in all directions utilizing iris scissors. Following this, the designed flap was advanced and carried over into the primary defect and sutured into place.
Modified Advancement Flap Text: The defect edges were debeveled with a #15 scalpel blade.  Given the location of the defect, shape of the defect and the proximity to free margins a modified advancement flap was deemed most appropriate.  Using a sterile surgical marker, an appropriate advancement flap was drawn incorporating the defect and placing the expected incisions within the relaxed skin tension lines where possible.    The area thus outlined was incised deep to adipose tissue with a #15 scalpel blade.  The skin margins were undermined to an appropriate distance in all directions utilizing iris scissors.
Mucosal Advancement Flap Text: Given the location of the defect, shape of the defect and the proximity to free margins a mucosal advancement flap was deemed most appropriate. Incisions were made with a 15 blade scalpel in the appropriate fashion along the cutaneous vermilion border and the mucosal lip. The remaining actinically damaged mucosal tissue was excised.  The mucosal advancement flap was then elevated to the gingival sulcus with care taken to preserve the neurovascular structures and advanced into the primary defect. Care was taken to ensure that precise realignment of the vermilion border was achieved.
Peng Advancement Flap Text: The defect edges were debeveled with a #15 scalpel blade. Given the location of the defect, shape of the defect and the proximity to free margins a Peng advancement flap was deemed most appropriate. Using a sterile surgical marker, an appropriate advancement flap was drawn incorporating the defect and placing the expected incisions within the relaxed skin tension lines where possible. The area thus outlined was incised deep to adipose tissue with a #15 scalpel blade. The skin margins were undermined to an appropriate distance in all directions utilizing iris scissors. Following this, the designed flap was advanced and carried over into the primary defect and sutured into place.
Hatchet Flap Text: The defect edges were debeveled with a #15 scalpel blade.  Given the location of the defect, shape of the defect and the proximity to free margins a hatchet flap was deemed most appropriate.  Using a sterile surgical marker, an appropriate hatchet flap was drawn incorporating the defect and placing the expected incisions within the relaxed skin tension lines where possible.    The area thus outlined was incised deep to adipose tissue with a #15 scalpel blade.  The skin margins were undermined to an appropriate distance in all directions utilizing iris scissors.
Rotation Flap Text: The defect edges were debeveled with a #15 scalpel blade.  Given the location of the defect, shape of the defect and the proximity to free margins a rotation flap was deemed most appropriate.  Using a sterile surgical marker, an appropriate rotation flap was drawn incorporating the defect and placing the expected incisions within the relaxed skin tension lines where possible.    The area thus outlined was incised deep to adipose tissue with a #15 scalpel blade.  The skin margins were undermined to an appropriate distance in all directions utilizing iris scissors.
Bilateral Rotation Flap Text: The defect edges were debeveled with a #15 scalpel blade. Given the location of the defect, shape of the defect and the proximity to free margins a bilateral rotation flap was deemed most appropriate. Using a sterile surgical marker, an appropriate rotation flap was drawn incorporating the defect and placing the expected incisions within the relaxed skin tension lines where possible. The area thus outlined was incised deep to adipose tissue with a #15 scalpel blade. The skin margins were undermined to an appropriate distance in all directions utilizing iris scissors. Following this, the designed flap was carried over into the primary defect and sutured into place.
Spiral Flap Text: The defect edges were debeveled with a #15 scalpel blade.  Given the location of the defect, shape of the defect and the proximity to free margins a spiral flap was deemed most appropriate.  Using a sterile surgical marker, an appropriate rotation flap was drawn incorporating the defect and placing the expected incisions within the relaxed skin tension lines where possible. The area thus outlined was incised deep to adipose tissue with a #15 scalpel blade.  The skin margins were undermined to an appropriate distance in all directions utilizing iris scissors.
Staged Advancement Flap Text: The defect edges were debeveled with a #15 scalpel blade. Given the location of the defect, shape of the defect and the proximity to free margins a staged advancement flap was deemed most appropriate. Using a sterile surgical marker, an appropriate advancement flap was drawn incorporating the defect and placing the expected incisions within the relaxed skin tension lines where possible. The area thus outlined was incised deep to adipose tissue with a #15 scalpel blade. The skin margins were undermined to an appropriate distance in all directions utilizing iris scissors. Following this, the designed flap was carried over into the primary defect and sutured into place.
Star Wedge Flap Text: The defect edges were debeveled with a #15 scalpel blade.  Given the location of the defect, shape of the defect and the proximity to free margins a star wedge flap was deemed most appropriate.  Using a sterile surgical marker, an appropriate rotation flap was drawn incorporating the defect and placing the expected incisions within the relaxed skin tension lines where possible. The area thus outlined was incised deep to adipose tissue with a #15 scalpel blade.  The skin margins were undermined to an appropriate distance in all directions utilizing iris scissors.
Transposition Flap Text: The defect edges were debeveled with a #15 scalpel blade.  Given the location of the defect and the proximity to free margins a transposition flap was deemed most appropriate.  Using a sterile surgical marker, an appropriate transposition flap was drawn incorporating the defect.    The area thus outlined was incised deep to adipose tissue with a #15 scalpel blade.  The skin margins were undermined to an appropriate distance in all directions utilizing iris scissors.
Muscle Hinge Flap Text: The defect edges were debeveled with a #15 scalpel blade.  Given the size, depth and location of the defect and the proximity to free margins a muscle hinge flap was deemed most appropriate.  Using a sterile surgical marker, an appropriate hinge flap was drawn incorporating the defect. The area thus outlined was incised with a #15 scalpel blade.  The skin margins were undermined to an appropriate distance in all directions utilizing iris scissors.
Mustarde Flap Text: The defect edges were debeveled with a #15 scalpel blade.  Given the size, depth and location of the defect and the proximity to free margins a Mustarde flap was deemed most appropriate. Using a sterile surgical marker, an appropriate flap was drawn incorporating the defect. The area thus outlined was incised with a #15 scalpel blade. The skin margins were undermined to an appropriate distance in all directions utilizing iris scissors. Following this, the designed flap was carried into the primary defect and sutured into place.
Nasal Turnover Hinge Flap Text: The defect edges were debeveled with a #15 scalpel blade.  Given the size, depth, location of the defect and the defect being full thickness a nasal turnover hinge flap was deemed most appropriate. Using a sterile surgical marker, an appropriate hinge flap was drawn incorporating the defect. The area thus outlined was incised with a #15 scalpel blade. The flap was designed to recreate the nasal mucosal lining and the alar rim. The skin margins were undermined to an appropriate distance in all directions utilizing iris scissors. Following this, the designed flap was carried over into the primary defect and sutured into place
Nasalis-Muscle-Based Myocutaneous Island Pedicle Flap Text: Using a #15 blade, an incision was made around the donor flap to the level of the nasalis muscle. Wide lateral undermining was then performed in both the subcutaneous plane above the nasalis muscle, and in a submuscular plane just above periosteum. This allowed the formation of a free nasalis muscle axial pedicle (based on the angular artery) which was still attached to the actual cutaneous flap, increasing its mobility and vascular viability. Hemostasis was obtained with pinpoint electrocoagulation. The flap was mobilized into position and the pivotal anchor points positioned and stabilized with buried interrupted sutures. Subcutaneous and dermal tissues were closed in a multilayered fashion with sutures. Tissue redundancies were excised, and the epidermal edges were apposed without significant tension and sutured with sutures.
Orbicularis Oris Muscle Flap Text: The defect edges were debeveled with a #15 scalpel blade.  Given that the defect affected the competency of the oral sphincter an orbicularis oris muscle flap was deemed most appropriate to restore this competency and normal muscle function.  Using a sterile surgical marker, an appropriate flap was drawn incorporating the defect. The area thus outlined was incised with a #15 scalpel blade. Following this, the designed flap was carried over into the primary defect and sutured into place.
Melolabial Transposition Flap Text: The defect edges were debeveled with a #15 scalpel blade.  Given the location of the defect and the proximity to free margins a melolabial flap was deemed most appropriate.  Using a sterile surgical marker, an appropriate melolabial transposition flap was drawn incorporating the defect.    The area thus outlined was incised deep to adipose tissue with a #15 scalpel blade.  The skin margins were undermined to an appropriate distance in all directions utilizing iris scissors.
Rhombic Flap Text: The defect edges were debeveled with a #15 scalpel blade.  Given the location of the defect and the proximity to free margins a rhombic flap was deemed most appropriate.  Using a sterile surgical marker, an appropriate rhombic flap was drawn incorporating the defect.    The area thus outlined was incised deep to adipose tissue with a #15 scalpel blade.  The skin margins were undermined to an appropriate distance in all directions utilizing iris scissors.
Rhomboid Transposition Flap Text: The defect edges were debeveled with a #15 scalpel blade. Given the location of the defect and the proximity to free margins a rhomboid transposition flap was deemed most appropriate. Using a sterile surgical marker, an appropriate rhomboid flap was drawn incorporating the defect. The area thus outlined was incised deep to adipose tissue with a #15 scalpel blade. The skin margins were undermined to an appropriate distance in all directions utilizing iris scissors. Following this, the designed flap was carried over into the primary defect and sutured into place.
Bi-Rhombic Flap Text: The defect edges were debeveled with a #15 scalpel blade.  Given the location of the defect and the proximity to free margins a bi-rhombic flap was deemed most appropriate.  Using a sterile surgical marker, an appropriate rhombic flap was drawn incorporating the defect. The area thus outlined was incised deep to adipose tissue with a #15 scalpel blade.  The skin margins were undermined to an appropriate distance in all directions utilizing iris scissors.
Helical Rim Advancement Flap Text: The defect edges were debeveled with a #15 blade scalpel.  Given the location of the defect and the proximity to free margins (helical rim) a double helical rim advancement flap was deemed most appropriate.  Using a sterile surgical marker, the appropriate advancement flaps were drawn incorporating the defect and placing the expected incisions between the helical rim and antihelix where possible.  The area thus outlined was incised through and through with a #15 scalpel blade.  With a skin hook and iris scissors, the flaps were gently and sharply undermined and freed up.
Bilateral Helical Rim Advancement Flap Text: The defect edges were debeveled with a #15 blade scalpel.  Given the location of the defect and the proximity to free margins (helical rim) a bilateral helical rim advancement flap was deemed most appropriate.  Using a sterile surgical marker, the appropriate advancement flaps were drawn incorporating the defect and placing the expected incisions between the helical rim and antihelix where possible.  The area thus outlined was incised through and through with a #15 scalpel blade.  With a skin hook and iris scissors, the flaps were gently and sharply undermined and freed up.
Ear Star Wedge Flap Text: The defect edges were debeveled with a #15 blade scalpel.  Given the location of the defect and the proximity to free margins (helical rim) an ear star wedge flap was deemed most appropriate.  Using a sterile surgical marker, the appropriate flap was drawn incorporating the defect and placing the expected incisions between the helical rim and antihelix where possible.  The area thus outlined was incised through and through with a #15 scalpel blade.
Banner Transposition Flap Text: The defect edges were debeveled with a #15 scalpel blade. Given the location of the defect and the proximity to free margins a Banner transposition flap was deemed most appropriate. Using a sterile surgical marker, an appropriate flap was drawn around the defect. The area thus outlined was incised deep to adipose tissue with a #15 scalpel blade. The skin margins were undermined to an appropriate distance in all directions utilizing iris scissors. Following this, the designed flap was carried into the primary defect and sutured into place.
Bilobed Flap Text: The defect edges were debeveled with a #15 scalpel blade.  Given the location of the defect and the proximity to free margins a bilobe flap was deemed most appropriate.  Using a sterile surgical marker, an appropriate bilobe flap drawn around the defect.    The area thus outlined was incised deep to adipose tissue with a #15 scalpel blade.  The skin margins were undermined to an appropriate distance in all directions utilizing iris scissors.
Bilobed Transposition Flap Text: The defect edges were debeveled with a #15 scalpel blade.  Given the location of the defect and the proximity to free margins a bilobed transposition flap was deemed most appropriate.  Using a sterile surgical marker, an appropriate bilobe flap drawn around the defect.    The area thus outlined was incised deep to adipose tissue with a #15 scalpel blade.  The skin margins were undermined to an appropriate distance in all directions utilizing iris scissors.
Trilobed Flap Text: The defect edges were debeveled with a #15 scalpel blade.  Given the location of the defect and the proximity to free margins a trilobed flap was deemed most appropriate.  Using a sterile surgical marker, an appropriate trilobed flap drawn around the defect.    The area thus outlined was incised deep to adipose tissue with a #15 scalpel blade.  The skin margins were undermined to an appropriate distance in all directions utilizing iris scissors.
Dorsal Nasal Flap Text: The defect edges were debeveled with a #15 scalpel blade.  Given the location of the defect and the proximity to free margins a dorsal nasal flap was deemed most appropriate.  Using a sterile surgical marker, an appropriate dorsal nasal flap was drawn around the defect.    The area thus outlined was incised deep to adipose tissue with a #15 scalpel blade.  The skin margins were undermined to an appropriate distance in all directions utilizing iris scissors.
Island Pedicle Flap Text: The defect edges were debeveled with a #15 scalpel blade.  Given the location of the defect, shape of the defect and the proximity to free margins an island pedicle advancement flap was deemed most appropriate.  Using a sterile surgical marker, an appropriate advancement flap was drawn incorporating the defect, outlining the appropriate donor tissue and placing the expected incisions within the relaxed skin tension lines where possible.    The area thus outlined was incised deep to adipose tissue with a #15 scalpel blade.  The skin margins were undermined to an appropriate distance in all directions around the primary defect and laterally outward around the island pedicle utilizing iris scissors.  There was minimal undermining beneath the pedicle flap.
Island Pedicle Flap With Canthal Suspension Text: The defect edges were debeveled with a #15 scalpel blade.  Given the location of the defect, shape of the defect and the proximity to free margins an island pedicle advancement flap was deemed most appropriate.  Using a sterile surgical marker, an appropriate advancement flap was drawn incorporating the defect, outlining the appropriate donor tissue and placing the expected incisions within the relaxed skin tension lines where possible. The area thus outlined was incised deep to adipose tissue with a #15 scalpel blade.  The skin margins were undermined to an appropriate distance in all directions around the primary defect and laterally outward around the island pedicle utilizing iris scissors.  There was minimal undermining beneath the pedicle flap. A suspension suture was placed in the canthal tendon to prevent tension and prevent ectropion.
Alar Island Pedicle Flap Text: The defect edges were debeveled with a #15 scalpel blade.  Given the location of the defect, shape of the defect and the proximity to the alar rim an island pedicle advancement flap was deemed most appropriate.  Using a sterile surgical marker, an appropriate advancement flap was drawn incorporating the defect, outlining the appropriate donor tissue and placing the expected incisions within the nasal ala running parallel to the alar rim. The area thus outlined was incised with a #15 scalpel blade.  The skin margins were undermined minimally to an appropriate distance in all directions around the primary defect and laterally outward around the island pedicle utilizing iris scissors.  There was minimal undermining beneath the pedicle flap.
Double Island Pedicle Flap Text: The defect edges were debeveled with a #15 scalpel blade.  Given the location of the defect, shape of the defect and the proximity to free margins a double island pedicle advancement flap was deemed most appropriate.  Using a sterile surgical marker, an appropriate advancement flap was drawn incorporating the defect, outlining the appropriate donor tissue and placing the expected incisions within the relaxed skin tension lines where possible.    The area thus outlined was incised deep to adipose tissue with a #15 scalpel blade.  The skin margins were undermined to an appropriate distance in all directions around the primary defect and laterally outward around the island pedicle utilizing iris scissors.  There was minimal undermining beneath the pedicle flap.
Island Pedicle Flap-Requiring Vessel Identification Text: The defect edges were debeveled with a #15 scalpel blade.  Given the location of the defect, shape of the defect and the proximity to free margins an island pedicle advancement flap was deemed most appropriate.  Using a sterile surgical marker, an appropriate advancement flap was drawn, based on the axial vessel mentioned above, incorporating the defect, outlining the appropriate donor tissue and placing the expected incisions within the relaxed skin tension lines where possible.    The area thus outlined was incised deep to adipose tissue with a #15 scalpel blade.  The skin margins were undermined to an appropriate distance in all directions around the primary defect and laterally outward around the island pedicle utilizing iris scissors.  There was minimal undermining beneath the pedicle flap.
Keystone Flap Text: The defect edges were debeveled with a #15 scalpel blade.  Given the location of the defect, shape of the defect a keystone flap was deemed most appropriate.  Using a sterile surgical marker, an appropriate keystone flap was drawn incorporating the defect, outlining the appropriate donor tissue and placing the expected incisions within the relaxed skin tension lines where possible. The area thus outlined was incised deep to adipose tissue with a #15 scalpel blade.  The skin margins were undermined to an appropriate distance in all directions around the primary defect and laterally outward around the flap utilizing iris scissors.
O-T Plasty Text: The defect edges were debeveled with a #15 scalpel blade.  Given the location of the defect, shape of the defect and the proximity to free margins an O-T plasty was deemed most appropriate.  Using a sterile surgical marker, an appropriate O-T plasty was drawn incorporating the defect and placing the expected incisions within the relaxed skin tension lines where possible.    The area thus outlined was incised deep to adipose tissue with a #15 scalpel blade.  The skin margins were undermined to an appropriate distance in all directions utilizing iris scissors.
O-Z Plasty Text: The defect edges were debeveled with a #15 scalpel blade.  Given the location of the defect, shape of the defect and the proximity to free margins an O-Z plasty (double transposition flap) was deemed most appropriate.  Using a sterile surgical marker, the appropriate transposition flaps were drawn incorporating the defect and placing the expected incisions within the relaxed skin tension lines where possible.    The area thus outlined was incised deep to adipose tissue with a #15 scalpel blade.  The skin margins were undermined to an appropriate distance in all directions utilizing iris scissors.  Hemostasis was achieved with electrocautery.  The flaps were then transposed into place, one clockwise and the other counterclockwise, and anchored with interrupted buried subcutaneous sutures.
Double O-Z Plasty Text: The defect edges were debeveled with a #15 scalpel blade. Given the location of the defect, shape of the defect and the proximity to free margins a Double O-Z plasty (double transposition flap) was deemed most appropriate. Using a sterile surgical marker, the appropriate transposition flaps were drawn incorporating the defect and placing the expected incisions within the relaxed skin tension lines where possible. The area thus outlined was incised deep to adipose tissue with a #15 scalpel blade. The skin margins were undermined to an appropriate distance in all directions utilizing iris scissors. Hemostasis was achieved with electrocautery. The flaps were then transposed and carried over into place, one clockwise and the other counterclockwise, and anchored with interrupted buried subcutaneous sutures.
V-Y Plasty Text: The defect edges were debeveled with a #15 scalpel blade.  Given the location of the defect, shape of the defect and the proximity to free margins an V-Y advancement flap was deemed most appropriate.  Using a sterile surgical marker, an appropriate advancement flap was drawn incorporating the defect and placing the expected incisions within the relaxed skin tension lines where possible.    The area thus outlined was incised deep to adipose tissue with a #15 scalpel blade.  The skin margins were undermined to an appropriate distance in all directions utilizing iris scissors.
H Plasty Text: Given the location of the defect, shape of the defect and the proximity to free margins a H-plasty was deemed most appropriate for repair.  Using a sterile surgical marker, the appropriate advancement arms of the H-plasty were drawn incorporating the defect and placing the expected incisions within the relaxed skin tension lines where possible. The area thus outlined was incised deep to adipose tissue with a #15 scalpel blade. The skin margins were undermined to an appropriate distance in all directions utilizing iris scissors.  The opposing advancement arms were then advanced into place in opposite direction and anchored with interrupted buried subcutaneous sutures.
W Plasty Text: The lesion was extirpated to the level of the fat with a #15 scalpel blade.  Given the location of the defect, shape of the defect and the proximity to free margins a W-plasty was deemed most appropriate for repair.  Using a sterile surgical marker, the appropriate transposition arms of the W-plasty were drawn incorporating the defect and placing the expected incisions within the relaxed skin tension lines where possible.    The area thus outlined was incised deep to adipose tissue with a #15 scalpel blade.  The skin margins were undermined to an appropriate distance in all directions utilizing iris scissors.  The opposing transposition arms were then transposed into place in opposite direction and anchored with interrupted buried subcutaneous sutures.
Z Plasty Text: The lesion was extirpated to the level of the fat with a #15 scalpel blade.  Given the location of the defect, shape of the defect and the proximity to free margins a Z-plasty was deemed most appropriate for repair.  Using a sterile surgical marker, the appropriate transposition arms of the Z-plasty were drawn incorporating the defect and placing the expected incisions within the relaxed skin tension lines where possible.    The area thus outlined was incised deep to adipose tissue with a #15 scalpel blade.  The skin margins were undermined to an appropriate distance in all directions utilizing iris scissors.  The opposing transposition arms were then transposed into place in opposite direction and anchored with interrupted buried subcutaneous sutures.
Double Z Plasty Text: The lesion was extirpated to the level of the fat with a #15 scalpel blade. Given the location of the defect, shape of the defect and the proximity to free margins a double Z-plasty was deemed most appropriate for repair. Using a sterile surgical marker, the appropriate transposition arms of the double Z-plasty were drawn incorporating the defect and placing the expected incisions within the relaxed skin tension lines where possible. The area thus outlined was incised deep to adipose tissue with a #15 scalpel blade. The skin margins were undermined to an appropriate distance in all directions utilizing iris scissors. The opposing transposition arms were then transposed and carried over into place in opposite direction and anchored with interrupted buried subcutaneous sutures.
Zygomaticofacial Flap Text: Given the location of the defect, shape of the defect and the proximity to free margins a zygomaticofacial flap was deemed most appropriate for repair. Using a sterile surgical marker, the appropriate flap was drawn incorporating the defect and placing the expected incisions within the relaxed skin tension lines where possible. The area thus outlined was incised deep to adipose tissue with a #15 scalpel blade with preservation of a vascular pedicle.  The skin margins were undermined to an appropriate distance in all directions utilizing iris scissors. The flap was then carried over into the defect and anchored with interrupted buried subcutaneous sutures.
Cheek Interpolation Flap Text: A decision was made to reconstruct the defect utilizing an interpolation axial flap and a staged reconstruction.  A telfa template was made of the defect.  This telfa template was then used to outline the Cheek Interpolation flap.  The donor area for the pedicle flap was then injected with anesthesia.  The flap was excised through the skin and subcutaneous tissue down to the layer of the underlying musculature.  The interpolation flap was carefully excised within this deep plane to maintain its blood supply.  The edges of the donor site were undermined.   The donor site was closed in a primary fashion.  The pedicle was then rotated into position and sutured.  Once the tube was sutured into place, adequate blood supply was confirmed with blanching and refill.  The pedicle was then wrapped with xeroform gauze and dressed appropriately with a telfa and gauze bandage to ensure continued blood supply and protect the attached pedicle.
Cheek-To-Nose Interpolation Flap Text: A decision was made to reconstruct the defect utilizing an interpolation axial flap and a staged reconstruction.  A telfa template was made of the defect.  This telfa template was then used to outline the Cheek-To-Nose Interpolation flap.  The donor area for the pedicle flap was then injected with anesthesia.  The flap was excised through the skin and subcutaneous tissue down to the layer of the underlying musculature.  The interpolation flap was carefully excised within this deep plane to maintain its blood supply.  The edges of the donor site were undermined.   The donor site was closed in a primary fashion.  The pedicle was then rotated into position and sutured.  Once the tube was sutured into place, adequate blood supply was confirmed with blanching and refill.  The pedicle was then wrapped with xeroform gauze and dressed appropriately with a telfa and gauze bandage to ensure continued blood supply and protect the attached pedicle.
Interpolation Flap Text: A decision was made to reconstruct the defect utilizing an interpolation axial flap and a staged reconstruction.  A telfa template was made of the defect.  This telfa template was then used to outline the interpolation flap.  The donor area for the pedicle flap was then injected with anesthesia.  The flap was excised through the skin and subcutaneous tissue down to the layer of the underlying musculature.  The interpolation flap was carefully excised within this deep plane to maintain its blood supply.  The edges of the donor site were undermined.   The donor site was closed in a primary fashion.  The pedicle was then rotated into position and sutured.  Once the tube was sutured into place, adequate blood supply was confirmed with blanching and refill.  The pedicle was then wrapped with xeroform gauze and dressed appropriately with a telfa and gauze bandage to ensure continued blood supply and protect the attached pedicle.
Melolabial Interpolation Flap Text: A decision was made to reconstruct the defect utilizing an interpolation axial flap and a staged reconstruction.  A telfa template was made of the defect.  This telfa template was then used to outline the melolabial interpolation flap.  The donor area for the pedicle flap was then injected with anesthesia.  The flap was excised through the skin and subcutaneous tissue down to the layer of the underlying musculature.  The pedicle flap was carefully excised within this deep plane to maintain its blood supply.  The edges of the donor site were undermined.   The donor site was closed in a primary fashion.  The pedicle was then rotated into position and sutured.  Once the tube was sutured into place, adequate blood supply was confirmed with blanching and refill.  The pedicle was then wrapped with xeroform gauze and dressed appropriately with a telfa and gauze bandage to ensure continued blood supply and protect the attached pedicle.
Mastoid Interpolation Flap Text: A decision was made to reconstruct the defect utilizing an interpolation axial flap and a staged reconstruction.  A telfa template was made of the defect.  This telfa template was then used to outline the mastoid interpolation flap.  The donor area for the pedicle flap was then injected with anesthesia.  The flap was excised through the skin and subcutaneous tissue down to the layer of the underlying musculature.  The pedicle flap was carefully excised within this deep plane to maintain its blood supply.  The edges of the donor site were undermined.   The donor site was closed in a primary fashion.  The pedicle was then rotated into position and sutured.  Once the tube was sutured into place, adequate blood supply was confirmed with blanching and refill.  The pedicle was then wrapped with xeroform gauze and dressed appropriately with a telfa and gauze bandage to ensure continued blood supply and protect the attached pedicle.
Posterior Auricular Interpolation Flap Text: A decision was made to reconstruct the defect utilizing an interpolation axial flap and a staged reconstruction.  A telfa template was made of the defect.  This telfa template was then used to outline the posterior auricular interpolation flap.  The donor area for the pedicle flap was then injected with anesthesia.  The flap was excised through the skin and subcutaneous tissue down to the layer of the underlying musculature.  The pedicle flap was carefully excised within this deep plane to maintain its blood supply.  The edges of the donor site were undermined.   The donor site was closed in a primary fashion.  The pedicle was then rotated into position and sutured.  Once the tube was sutured into place, adequate blood supply was confirmed with blanching and refill.  The pedicle was then wrapped with xeroform gauze and dressed appropriately with a telfa and gauze bandage to ensure continued blood supply and protect the attached pedicle.
Paramedian Forehead Flap Text: A decision was made to reconstruct the defect utilizing an interpolation axial flap and a staged reconstruction.  A telfa template was made of the defect.  This telfa template was then used to outline the paramedian forehead pedicle flap.  The donor area for the pedicle flap was then injected with anesthesia.  The flap was excised through the skin and subcutaneous tissue down to the layer of the underlying musculature.  The pedicle flap was carefully excised within this deep plane to maintain its blood supply.  The edges of the donor site were undermined.   The donor site was closed in a primary fashion.  The pedicle was then rotated into position and sutured.  Once the tube was sutured into place, adequate blood supply was confirmed with blanching and refill.  The pedicle was then wrapped with xeroform gauze and dressed appropriately with a telfa and gauze bandage to ensure continued blood supply and protect the attached pedicle.
Abbe Flap (Upper To Lower Lip) Text: The defect of the lower lip was assessed and measured.  Given the location and size of the defect, an Abbe flap was deemed most appropriate. Using a sterile surgical marker, an appropriate Abbe flap was measured and drawn on the upper lip. Local anesthesia was then infiltrated.  A scalpel was then used to incise the upper lip through and through the skin, vermilion, muscle and mucosa, leaving the flap pedicled on the opposite side.  The flap was then rotated and transferred to the lower lip defect.  The flap was then sutured into place with a three layer technique, closing the orbicularis oris muscle layer with subcutaneous buried sutures, followed by a mucosal layer and an epidermal layer.
Abbe Flap (Lower To Upper Lip) Text: The defect of the upper lip was assessed and measured.  Given the location and size of the defect, an Abbe flap was deemed most appropriate. Using a sterile surgical marker, an appropriate Abbe flap was measured and drawn on the lower lip. Local anesthesia was then infiltrated. A scalpel was then used to incise the upper lip through and through the skin, vermilion, muscle and mucosa, leaving the flap pedicled on the opposite side.  The flap was then rotated and transferred to the lower lip defect.  The flap was then sutured into place with a three layer technique, closing the orbicularis oris muscle layer with subcutaneous buried sutures, followed by a mucosal layer and an epidermal layer.
Estlander Flap (Upper To Lower Lip) Text: The defect of the lower lip was assessed and measured.  Given the location and size of the defect, an Estlander flap was deemed most appropriate. Using a sterile surgical marker, an appropriate Estlander flap was measured and drawn on the upper lip. Local anesthesia was then infiltrated. A scalpel was then used to incise the lateral aspect of the flap, through skin, muscle and mucosa, leaving the flap pedicled medially.  The flap was then rotated and positioned to fill the lower lip defect.  The flap was then sutured into place with a three layer technique, closing the orbicularis oris muscle layer with subcutaneous buried sutures, followed by a mucosal layer and an epidermal layer.
Lip Wedge Excision Repair Text: Given the location of the defect and the proximity to free margins a full thickness wedge repair was deemed most appropriate.  Using a sterile surgical marker, the appropriate repair was drawn incorporating the defect and placing the expected incisions perpendicular to the vermilion border.  The vermilion border was also meticulously outlined to ensure appropriate reapproximation during the repair.  The area thus outlined was incised through and through with a #15 scalpel blade.  The muscularis and dermis were reaproximated with deep sutures following hemostasis. Care was taken to realign the vermilion border before proceeding with the superficial closure.  Once the vermilion was realigned the superfical and mucosal closure was finished.
Ftsg Text: The defect edges were debeveled with a #15 scalpel blade.  Given the location of the defect, shape of the defect and the proximity to free margins a full thickness skin graft was deemed most appropriate.  Using a sterile surgical marker, the primary defect shape was transferred to the donor site. The area thus outlined was incised deep to adipose tissue with a #15 scalpel blade.  The harvested graft was then trimmed of adipose tissue until only dermis and epidermis was left.  The skin margins of the secondary defect were undermined to an appropriate distance in all directions utilizing iris scissors.  The secondary defect was closed with interrupted buried subcutaneous sutures.  The skin edges were then re-apposed with running  sutures.  The skin graft was then placed in the primary defect and oriented appropriately.
Split-Thickness Skin Graft Text: The defect edges were debeveled with a #15 scalpel blade.  Given the location of the defect, shape of the defect and the proximity to free margins a split thickness skin graft was deemed most appropriate.  Using a sterile surgical marker, the primary defect shape was transferred to the donor site. The split thickness graft was then harvested.  The skin graft was then placed in the primary defect and oriented appropriately.
Pinch Graft Text: The defect edges were debeveled with a #15 scalpel blade. Given the location of the defect, shape of the defect and the proximity to free margins a pinch graft was deemed most appropriate. Using a sterile surgical marker, the primary defect shape was transferred to the donor site. The area thus outlined was incised deep to adipose tissue with a #15 scalpel blade.  The harvested graft was then trimmed of adipose tissue until only dermis and epidermis was left. The skin margins of the secondary defect were undermined to an appropriate distance in all directions utilizing iris scissors.  The secondary defect was closed with interrupted buried subcutaneous sutures.  The skin edges were then re-apposed with running  sutures.  The skin graft was then placed in the primary defect and oriented appropriately.
Burow's Graft Text: The defect edges were debeveled with a #15 scalpel blade. Given the location of the defect, shape of the defect, the proximity to free margins and the presence of a standing cone deformity a Burow's skin graft was deemed most appropriate. The standing cone was removed and this tissue was then trimmed to the shape of the primary defect. The adipose tissue was also removed until only dermis and epidermis were left.  The skin margins of the secondary defect were undermined to an appropriate distance in all directions utilizing iris scissors.  The secondary defect was closed with interrupted buried subcutaneous sutures.  The skin edges were then re-apposed with running  sutures.  The skin graft was then placed in the primary defect and oriented appropriately.
Cartilage Graft Text: The defect edges were debeveled with a #15 scalpel blade.  Given the location of the defect, shape of the defect, the fact the defect involved a full thickness cartilage defect a cartilage graft was deemed most appropriate.  An appropriate donor site was identified, cleansed, and anesthetized. The cartilage graft was then harvested and transferred to the recipient site, oriented appropriately and then sutured into place.  The secondary defect was then repaired using a primary closure.
Composite Graft Text: The defect edges were debeveled with a #15 scalpel blade.  Given the location of the defect, shape of the defect, the proximity to free margins and the fact the defect was full thickness a composite graft was deemed most appropriate.  The defect was outline and then transferred to the donor site.  A full thickness graft was then excised from the donor site. The graft was then placed in the primary defect, oriented appropriately and then sutured into place.  The secondary defect was then repaired using a primary closure.
Epidermal Autograft Text: The defect edges were debeveled with a #15 scalpel blade.  Given the location of the defect, shape of the defect and the proximity to free margins an epidermal autograft was deemed most appropriate.  Using a sterile surgical marker, the primary defect shape was transferred to the donor site. The epidermal graft was then harvested.  The skin graft was then placed in the primary defect and oriented appropriately.
Dermal Autograft Text: The defect edges were debeveled with a #15 scalpel blade.  Given the location of the defect, shape of the defect and the proximity to free margins a dermal autograft was deemed most appropriate.  Using a sterile surgical marker, the primary defect shape was transferred to the donor site. The area thus outlined was incised deep to adipose tissue with a #15 scalpel blade.  The harvested graft was then trimmed of adipose and epidermal tissue until only dermis was left.  The skin graft was then placed in the primary defect and oriented appropriately.
Skin Substitute Text: The defect edges were debeveled with a #15 scalpel blade.  Given the location of the defect, shape of the defect and the proximity to free margins a skin substitute graft was deemed most appropriate.  The graft material was trimmed to fit the size of the defect. The graft was then placed in the primary defect and oriented appropriately.
Tissue Cultured Epidermal Autograft Text: The defect edges were debeveled with a #15 scalpel blade.  Given the location of the defect, shape of the defect and the proximity to free margins a tissue cultured epidermal autograft was deemed most appropriate.  The graft was then trimmed to fit the size of the defect.  The graft was then placed in the primary defect and oriented appropriately.
Xenograft Text: The defect edges were debeveled with a #15 scalpel blade.  Given the location of the defect, shape of the defect and the proximity to free margins a xenograft was deemed most appropriate.  The graft was then trimmed to fit the size of the defect.  The graft was then placed in the primary defect and oriented appropriately.
Purse String (Intermediate) Text: Given the location of the defect and the characteristics of the surrounding skin a purse string intermediate closure was deemed most appropriate.  Undermining was performed circumfirentially around the surgical defect.  A purse string suture was then placed and tightened.
Purse String (Simple) Text: Given the location of the defect and the characteristics of the surrounding skin a purse string simple closure was deemed most appropriate.  Undermining was performed circumferentially around the surgical defect.  A purse string suture was then placed and tightened.
Partial Purse String (Intermediate) Text: Given the location of the defect and the characteristics of the surrounding skin an intermediate purse string closure was deemed most appropriate.  Undermining was performed circumferentially around the surgical defect.  A purse string suture was then placed and tightened. Wound tension of the circular defect prevented complete closure of the wound.
Partial Purse String (Simple) Text: Given the location of the defect and the characteristics of the surrounding skin a simple purse string closure was deemed most appropriate.  Undermining was performed circumferentially around the surgical defect.  A purse string suture was then placed and tightened. Wound tension of the circular defect prevented complete closure of the wound.
Complex Repair And Single Advancement Flap Text: The defect edges were debeveled with a #15 scalpel blade.  The primary defect was closed partially with a complex linear closure.  Given the location of the remaining defect, shape of the defect and the proximity to free margins a single advancement flap was deemed most appropriate for complete closure of the defect.  Using a sterile surgical marker, an appropriate advancement flap was drawn incorporating the defect and placing the expected incisions within the relaxed skin tension lines where possible.    The area thus outlined was incised deep to adipose tissue with a #15 scalpel blade.  The skin margins were undermined to an appropriate distance in all directions utilizing iris scissors.
Complex Repair And Double Advancement Flap Text: The defect edges were debeveled with a #15 scalpel blade.  The primary defect was closed partially with a complex linear closure.  Given the location of the remaining defect, shape of the defect and the proximity to free margins a double advancement flap was deemed most appropriate for complete closure of the defect.  Using a sterile surgical marker, an appropriate advancement flap was drawn incorporating the defect and placing the expected incisions within the relaxed skin tension lines where possible.    The area thus outlined was incised deep to adipose tissue with a #15 scalpel blade.  The skin margins were undermined to an appropriate distance in all directions utilizing iris scissors.
Complex Repair And Modified Advancement Flap Text: The defect edges were debeveled with a #15 scalpel blade.  The primary defect was closed partially with a complex linear closure.  Given the location of the remaining defect, shape of the defect and the proximity to free margins a modified advancement flap was deemed most appropriate for complete closure of the defect.  Using a sterile surgical marker, an appropriate advancement flap was drawn incorporating the defect and placing the expected incisions within the relaxed skin tension lines where possible.    The area thus outlined was incised deep to adipose tissue with a #15 scalpel blade.  The skin margins were undermined to an appropriate distance in all directions utilizing iris scissors.
Complex Repair And A-T Advancement Flap Text: The defect edges were debeveled with a #15 scalpel blade.  The primary defect was closed partially with a complex linear closure.  Given the location of the remaining defect, shape of the defect and the proximity to free margins an A-T advancement flap was deemed most appropriate for complete closure of the defect.  Using a sterile surgical marker, an appropriate advancement flap was drawn incorporating the defect and placing the expected incisions within the relaxed skin tension lines where possible.    The area thus outlined was incised deep to adipose tissue with a #15 scalpel blade.  The skin margins were undermined to an appropriate distance in all directions utilizing iris scissors.
Complex Repair And O-T Advancement Flap Text: The defect edges were debeveled with a #15 scalpel blade.  The primary defect was closed partially with a complex linear closure.  Given the location of the remaining defect, shape of the defect and the proximity to free margins an O-T advancement flap was deemed most appropriate for complete closure of the defect.  Using a sterile surgical marker, an appropriate advancement flap was drawn incorporating the defect and placing the expected incisions within the relaxed skin tension lines where possible.    The area thus outlined was incised deep to adipose tissue with a #15 scalpel blade.  The skin margins were undermined to an appropriate distance in all directions utilizing iris scissors.
Complex Repair And O-L Flap Text: The defect edges were debeveled with a #15 scalpel blade.  The primary defect was closed partially with a complex linear closure.  Given the location of the remaining defect, shape of the defect and the proximity to free margins an O-L flap was deemed most appropriate for complete closure of the defect.  Using a sterile surgical marker, an appropriate flap was drawn incorporating the defect and placing the expected incisions within the relaxed skin tension lines where possible.    The area thus outlined was incised deep to adipose tissue with a #15 scalpel blade.  The skin margins were undermined to an appropriate distance in all directions utilizing iris scissors.
Complex Repair And Bilobe Flap Text: The defect edges were debeveled with a #15 scalpel blade.  The primary defect was closed partially with a complex linear closure.  Given the location of the remaining defect, shape of the defect and the proximity to free margins a bilobe flap was deemed most appropriate for complete closure of the defect.  Using a sterile surgical marker, an appropriate advancement flap was drawn incorporating the defect and placing the expected incisions within the relaxed skin tension lines where possible.    The area thus outlined was incised deep to adipose tissue with a #15 scalpel blade.  The skin margins were undermined to an appropriate distance in all directions utilizing iris scissors.
Complex Repair And Melolabial Flap Text: The defect edges were debeveled with a #15 scalpel blade.  The primary defect was closed partially with a complex linear closure.  Given the location of the remaining defect, shape of the defect and the proximity to free margins a melolabial flap was deemed most appropriate for complete closure of the defect.  Using a sterile surgical marker, an appropriate advancement flap was drawn incorporating the defect and placing the expected incisions within the relaxed skin tension lines where possible.    The area thus outlined was incised deep to adipose tissue with a #15 scalpel blade.  The skin margins were undermined to an appropriate distance in all directions utilizing iris scissors.
Complex Repair And Rotation Flap Text: The defect edges were debeveled with a #15 scalpel blade.  The primary defect was closed partially with a complex linear closure.  Given the location of the remaining defect, shape of the defect and the proximity to free margins a rotation flap was deemed most appropriate for complete closure of the defect.  Using a sterile surgical marker, an appropriate advancement flap was drawn incorporating the defect and placing the expected incisions within the relaxed skin tension lines where possible.    The area thus outlined was incised deep to adipose tissue with a #15 scalpel blade.  The skin margins were undermined to an appropriate distance in all directions utilizing iris scissors.
Complex Repair And Rhombic Flap Text: The defect edges were debeveled with a #15 scalpel blade.  The primary defect was closed partially with a complex linear closure.  Given the location of the remaining defect, shape of the defect and the proximity to free margins a rhombic flap was deemed most appropriate for complete closure of the defect.  Using a sterile surgical marker, an appropriate advancement flap was drawn incorporating the defect and placing the expected incisions within the relaxed skin tension lines where possible.    The area thus outlined was incised deep to adipose tissue with a #15 scalpel blade.  The skin margins were undermined to an appropriate distance in all directions utilizing iris scissors.
Complex Repair And Transposition Flap Text: The defect edges were debeveled with a #15 scalpel blade.  The primary defect was closed partially with a complex linear closure.  Given the location of the remaining defect, shape of the defect and the proximity to free margins a transposition flap was deemed most appropriate for complete closure of the defect.  Using a sterile surgical marker, an appropriate advancement flap was drawn incorporating the defect and placing the expected incisions within the relaxed skin tension lines where possible.    The area thus outlined was incised deep to adipose tissue with a #15 scalpel blade.  The skin margins were undermined to an appropriate distance in all directions utilizing iris scissors.
Complex Repair And V-Y Plasty Text: The defect edges were debeveled with a #15 scalpel blade.  The primary defect was closed partially with a complex linear closure.  Given the location of the remaining defect, shape of the defect and the proximity to free margins a V-Y plasty was deemed most appropriate for complete closure of the defect.  Using a sterile surgical marker, an appropriate advancement flap was drawn incorporating the defect and placing the expected incisions within the relaxed skin tension lines where possible.    The area thus outlined was incised deep to adipose tissue with a #15 scalpel blade.  The skin margins were undermined to an appropriate distance in all directions utilizing iris scissors.
Complex Repair And M Plasty Text: The defect edges were debeveled with a #15 scalpel blade.  The primary defect was closed partially with a complex linear closure.  Given the location of the remaining defect, shape of the defect and the proximity to free margins an M plasty was deemed most appropriate for complete closure of the defect.  Using a sterile surgical marker, an appropriate advancement flap was drawn incorporating the defect and placing the expected incisions within the relaxed skin tension lines where possible.    The area thus outlined was incised deep to adipose tissue with a #15 scalpel blade.  The skin margins were undermined to an appropriate distance in all directions utilizing iris scissors.
Complex Repair And Double M Plasty Text: The defect edges were debeveled with a #15 scalpel blade.  The primary defect was closed partially with a complex linear closure.  Given the location of the remaining defect, shape of the defect and the proximity to free margins a double M plasty was deemed most appropriate for complete closure of the defect.  Using a sterile surgical marker, an appropriate advancement flap was drawn incorporating the defect and placing the expected incisions within the relaxed skin tension lines where possible.    The area thus outlined was incised deep to adipose tissue with a #15 scalpel blade.  The skin margins were undermined to an appropriate distance in all directions utilizing iris scissors.
Complex Repair And W Plasty Text: The defect edges were debeveled with a #15 scalpel blade.  The primary defect was closed partially with a complex linear closure.  Given the location of the remaining defect, shape of the defect and the proximity to free margins a W plasty was deemed most appropriate for complete closure of the defect.  Using a sterile surgical marker, an appropriate advancement flap was drawn incorporating the defect and placing the expected incisions within the relaxed skin tension lines where possible.    The area thus outlined was incised deep to adipose tissue with a #15 scalpel blade.  The skin margins were undermined to an appropriate distance in all directions utilizing iris scissors.
Complex Repair And Z Plasty Text: The defect edges were debeveled with a #15 scalpel blade.  The primary defect was closed partially with a complex linear closure.  Given the location of the remaining defect, shape of the defect and the proximity to free margins a Z plasty was deemed most appropriate for complete closure of the defect.  Using a sterile surgical marker, an appropriate advancement flap was drawn incorporating the defect and placing the expected incisions within the relaxed skin tension lines where possible.    The area thus outlined was incised deep to adipose tissue with a #15 scalpel blade.  The skin margins were undermined to an appropriate distance in all directions utilizing iris scissors.
Complex Repair And Dorsal Nasal Flap Text: The defect edges were debeveled with a #15 scalpel blade.  The primary defect was closed partially with a complex linear closure.  Given the location of the remaining defect, shape of the defect and the proximity to free margins a dorsal nasal flap was deemed most appropriate for complete closure of the defect.  Using a sterile surgical marker, an appropriate flap was drawn incorporating the defect and placing the expected incisions within the relaxed skin tension lines where possible.    The area thus outlined was incised deep to adipose tissue with a #15 scalpel blade.  The skin margins were undermined to an appropriate distance in all directions utilizing iris scissors.
Complex Repair And Ftsg Text: The defect edges were debeveled with a #15 scalpel blade.  The primary defect was closed partially with a complex linear closure.  Given the location of the defect, shape of the defect and the proximity to free margins a full thickness skin graft was deemed most appropriate to repair the remaining defect.  The graft was trimmed to fit the size of the remaining defect.  The graft was then placed in the primary defect, oriented appropriately, and sutured into place.
Complex Repair And Burow's Graft Text: The defect edges were debeveled with a #15 scalpel blade.  The primary defect was closed partially with a complex linear closure.  Given the location of the defect, shape of the defect, the proximity to free margins and the presence of a standing cone deformity a Burow's graft was deemed most appropriate to repair the remaining defect.  The graft was trimmed to fit the size of the remaining defect.  The graft was then placed in the primary defect, oriented appropriately, and sutured into place.
Complex Repair And Split-Thickness Skin Graft Text: The defect edges were debeveled with a #15 scalpel blade.  The primary defect was closed partially with a complex linear closure.  Given the location of the defect, shape of the defect and the proximity to free margins a split thickness skin graft was deemed most appropriate to repair the remaining defect.  The graft was trimmed to fit the size of the remaining defect.  The graft was then placed in the primary defect, oriented appropriately, and sutured into place.
Complex Repair And Epidermal Autograft Text: The defect edges were debeveled with a #15 scalpel blade.  The primary defect was closed partially with a complex linear closure.  Given the location of the defect, shape of the defect and the proximity to free margins an epidermal autograft was deemed most appropriate to repair the remaining defect.  The graft was trimmed to fit the size of the remaining defect.  The graft was then placed in the primary defect, oriented appropriately, and sutured into place.
Complex Repair And Dermal Autograft Text: The defect edges were debeveled with a #15 scalpel blade.  The primary defect was closed partially with a complex linear closure.  Given the location of the defect, shape of the defect and the proximity to free margins an dermal autograft was deemed most appropriate to repair the remaining defect.  The graft was trimmed to fit the size of the remaining defect.  The graft was then placed in the primary defect, oriented appropriately, and sutured into place.
Complex Repair And Tissue Cultured Epidermal Autograft Text: The defect edges were debeveled with a #15 scalpel blade.  The primary defect was closed partially with a complex linear closure.  Given the location of the defect, shape of the defect and the proximity to free margins an tissue cultured epidermal autograft was deemed most appropriate to repair the remaining defect.  The graft was trimmed to fit the size of the remaining defect.  The graft was then placed in the primary defect, oriented appropriately, and sutured into place.
Complex Repair And Xenograft Text: The defect edges were debeveled with a #15 scalpel blade.  The primary defect was closed partially with a complex linear closure.  Given the location of the defect, shape of the defect and the proximity to free margins a xenograft was deemed most appropriate to repair the remaining defect.  The graft was trimmed to fit the size of the remaining defect.  The graft was then placed in the primary defect, oriented appropriately, and sutured into place.
Complex Repair And Skin Substitute Graft Text: The defect edges were debeveled with a #15 scalpel blade.  The primary defect was closed partially with a complex linear closure.  Given the location of the remaining defect, shape of the defect and the proximity to free margins a skin substitute graft was deemed most appropriate to repair the remaining defect.  The graft was trimmed to fit the size of the remaining defect.  The graft was then placed in the primary defect, oriented appropriately, and sutured into place.
Path Notes (To The Dermatopathologist): Please check margins.
Consent was obtained from the patient. The risks and benefits to therapy were discussed in detail. Specifically, the risks of infection, scarring, bleeding, prolonged wound healing, incomplete removal, allergy to anesthesia, nerve injury and recurrence were addressed. Prior to the procedure, the treatment site was clearly identified and confirmed by the patient. All components of Universal Protocol/PAUSE Rule completed.
Post-Care Instructions: I reviewed with the patient in detail post-care instructions:\\n1. Apply bacitracin over the steri-strips.  \\n2. Cut non-stick pad (Telfa) to cover the steri-strips\\n3. Apply tape (hypafix) over the non-stick pad\\n4. Change once per day for 5 days\\n5. Shower with bandage on, change bandage after shower\\n\\nPatient is not to engage in any heavy lifting, exercise, hot tub, or swimming for the next 14 days. Should the patient develop any fevers, chills, bleeding, severe pain patient will contact the office immediately.
Home Suture Removal Text: Patient was provided a home suture removal kit and will remove their sutures at home.  If they have any questions or difficulties they will call the office.
Where Do You Want The Question To Include Opioid Counseling Located?: Case Summary Tab
Information: Selecting Yes will display possible errors in your note based on the variables you have selected. This validation is only offered as a suggestion for you. PLEASE NOTE THAT THE VALIDATION TEXT WILL BE REMOVED WHEN YOU FINALIZE YOUR NOTE. IF YOU WANT TO FAX A PRELIMINARY NOTE YOU WILL NEED TO TOGGLE THIS TO 'NO' IF YOU DO NOT WANT IT IN YOUR FAXED NOTE.

## 2023-11-10 ENCOUNTER — APPOINTMENT (RX ONLY)
Dept: URBAN - METROPOLITAN AREA CLINIC 4 | Facility: CLINIC | Age: 78
Setting detail: DERMATOLOGY
End: 2023-11-10

## 2023-11-10 PROBLEM — C44.622 SQUAMOUS CELL CARCINOMA OF SKIN OF RIGHT UPPER LIMB, INCLUDING SHOULDER: Status: ACTIVE | Noted: 2023-11-10

## 2023-11-10 PROCEDURE — 13121 CMPLX RPR S/A/L 2.6-7.5 CM: CPT

## 2023-11-10 PROCEDURE — ? EXCISION

## 2023-11-10 PROCEDURE — 11603 EXC TR-EXT MAL+MARG 2.1-3 CM: CPT

## 2023-11-10 NOTE — PROCEDURE: EXCISION
Surgeon (Optional): Gigi
Biopsy Photograph Reviewed: Yes
Previous Accession (Optional): [id number]
Size Of Lesion In Cm: 1.9
X Size Of Lesion In Cm (Optional): 0
Size Of Margin In Cm: 0.2
Anesthesia Volume In Cc: 12
Was An Eye Clamp Used?: No
Eye Clamp Note Details: An eye clamp was used during the procedure.
Excision Method: Elliptical
Saucerization Depth: dermis and superficial adipose tissue
Repair Type: Complex
Intermediate / Complex Repair - Final Wound Length In Cm: 5.7
Suturegard Retention Suture: 2-0 Nylon
Retention Suture Bite Size: 3 mm
Length To Time In Minutes Device Was In Place: 10
Number Of Hemigard Strips Per Side: 1
Distance Of Undermining In Cm (Required): 2.1
Undermining Type: Entire Wound
Debridement Text: The wound edges were debrided prior to proceeding with the closure to facilitate wound healing.
Helical Rim Text: The closure involved the helical rim.
Vermilion Border Text: The closure involved the vermilion border.
Nostril Rim Text: The closure involved the nostril rim.
Retention Suture Text: Retention sutures were placed to support the closure and prevent dehiscence.
Lab: [id number]
Lab Facility: 
Graft Donor Site Bandage (Optional-Leave Blank If You Don't Want In Note): Steri-strips and a pressure bandage were applied to the donor site.
Epidermal Closure Graft Donor Site (Optional): simple interrupted
Billing Type: Third-Party Bill
Excision Depth: adipose tissue
Scalpel Size: 15 blade
Anesthesia Type: 1% lidocaine with epinephrine
Additional Anesthesia Volume In Cc: 6
Hemostasis: Electrocautery
Estimated Blood Loss (Cc): minimal
Detail Level: Detailed
Repair Anesthesia Method: local infiltration
Anesthesia Volume In Cc: 14.2
Deep Sutures: 4-0 Vicryl
Dermal Closure: buried vertical mattress
Epidermal Sutures: 5-0 Caprosyn
Epidermal Closure: running subcuticular
Wound Care: Bacitracin
Dressing: dry sterile dressing
Suturegard Intro: Intraoperative tissue expansion was performed, utilizing the SUTUREGARD device, in order to reduce wound tension.
Suturegard Body: The suture ends were repeatedly re-tightened and re-clamped to achieve the desired tissue expansion.
Hemigard Intro: Due to skin fragility and wound tension, it was decided to use HEMIGARD adhesive retention suture devices to permit a linear closure. The skin was cleaned and dried for a 6cm distance away from the wound. Excessive hair, if present, was removed to allow for adhesion.
Hemigard Postcare Instructions: The HEMIGARD strips are to remain completely dry for at least 5-7 days.
Positioning (Leave Blank If You Do Not Want): The patient was placed in a comfortable position exposing the surgical site.
Pre-Excision Curettage Text (Leave Blank If You Do Not Want): Prior to drawing the surgical margin the visible lesion was removed with electrodesiccation and curettage to clearly define the lesion size.
Complex Repair Preamble Text (Leave Blank If You Do Not Want): Extensive wide undermining was performed.
Intermediate Repair Preamble Text (Leave Blank If You Do Not Want): Undermining was performed with blunt dissection.
Curvilinear Excision Additional Text (Leave Blank If You Do Not Want): The margin was drawn around the clinically apparent lesion.  A curvilinear shape was then drawn on the skin incorporating the lesion and margins.  Incisions were then made along these lines to the appropriate tissue plane and the lesion was extirpated.
Fusiform Excision Additional Text (Leave Blank If You Do Not Want): The margin was drawn around the clinically apparent lesion.  A fusiform shape was then drawn on the skin incorporating the lesion and margins.  Incisions were then made along these lines to the appropriate tissue plane and the lesion was extirpated.
Elliptical Excision Additional Text (Leave Blank If You Do Not Want): The margin was drawn around the clinically apparent lesion.  An elliptical shape was then drawn on the skin incorporating the lesion and margins.  Incisions were then made along these lines to the appropriate tissue plane and the lesion was extirpated.
Saucerization Excision Additional Text (Leave Blank If You Do Not Want): The margin was drawn around the clinically apparent lesion.  Incisions were then made along these lines, in a tangential fashion, to the appropriate tissue plane and the lesion was extirpated.
Slit Excision Additional Text (Leave Blank If You Do Not Want): A linear line was drawn on the skin overlying the lesion. An incision was made slowly until the lesion was visualized.  Once visualized, the lesion was removed with blunt dissection.
Excisional Biopsy Additional Text (Leave Blank If You Do Not Want): The margin was drawn around the clinically apparent lesion. An elliptical shape was then drawn on the skin incorporating the lesion and margins.  Incisions were then made along these lines to the appropriate tissue plane and the lesion was extirpated.
Perilesional Excision Additional Text (Leave Blank If You Do Not Want): The margin was drawn around the clinically apparent lesion. Incisions were then made along these lines to the appropriate tissue plane and the lesion was extirpated.
Repair Performed By Another Provider Text (Leave Blank If You Do Not Want): After the tissue was excised the defect was repaired by another provider.
No Repair - Repaired With Adjacent Surgical Defect Text (Leave Blank If You Do Not Want): After the excision the defect was repaired concurrently with another surgical defect which was in close approximation.
Adjacent Tissue Transfer Text: The defect edges were debeveled with a #15 scalpel blade. Given the location of the defect and the proximity to free margins an adjacent tissue transfer was deemed most appropriate. Using a sterile surgical marker, an appropriate flap was drawn incorporating the defect and placing the expected incisions within the relaxed skin tension lines where possible. The area thus outlined was incised deep to adipose tissue with a #15 scalpel blade. The skin margins were undermined to an appropriate distance in all directions utilizing iris scissors and carried over to close the primary defect.
Advancement Flap (Single) Text: The defect edges were debeveled with a #15 scalpel blade.  Given the location of the defect and the proximity to free margins a single advancement flap was deemed most appropriate.  Using a sterile surgical marker, an appropriate advancement flap was drawn incorporating the defect and placing the expected incisions within the relaxed skin tension lines where possible.    The area thus outlined was incised deep to adipose tissue with a #15 scalpel blade.  The skin margins were undermined to an appropriate distance in all directions utilizing iris scissors.
Advancement Flap (Double) Text: The defect edges were debeveled with a #15 scalpel blade.  Given the location of the defect and the proximity to free margins a double advancement flap was deemed most appropriate.  Using a sterile surgical marker, the appropriate advancement flaps were drawn incorporating the defect and placing the expected incisions within the relaxed skin tension lines where possible.    The area thus outlined was incised deep to adipose tissue with a #15 scalpel blade.  The skin margins were undermined to an appropriate distance in all directions utilizing iris scissors.
Burow's Advancement Flap Text: The defect edges were debeveled with a #15 scalpel blade.  Given the location of the defect and the proximity to free margins a Burow's advancement flap was deemed most appropriate.  Using a sterile surgical marker, the appropriate advancement flap was drawn incorporating the defect and placing the expected incisions within the relaxed skin tension lines where possible.    The area thus outlined was incised deep to adipose tissue with a #15 scalpel blade.  The skin margins were undermined to an appropriate distance in all directions utilizing iris scissors.
Chonodrocutaneous Helical Advancement Flap Text: The defect edges were debeveled with a #15 scalpel blade. Given the location of the defect and the proximity to free margins a chondrocutaneous helical advancement flap was deemed most appropriate. Using a sterile surgical marker, the appropriate advancement flap was drawn incorporating the defect and placing the expected incisions within the relaxed skin tension lines where possible. The area thus outlined was incised deep to adipose tissue with a #15 scalpel blade. The skin margins were undermined to an appropriate distance in all directions utilizing iris scissors. Following this, the designed flap was advanced and carried over into the primary defect and sutured into place.
Crescentic Advancement Flap Text: The defect edges were debeveled with a #15 scalpel blade.  Given the location of the defect and the proximity to free margins a crescentic advancement flap was deemed most appropriate.  Using a sterile surgical marker, the appropriate advancement flap was drawn incorporating the defect and placing the expected incisions within the relaxed skin tension lines where possible.    The area thus outlined was incised deep to adipose tissue with a #15 scalpel blade.  The skin margins were undermined to an appropriate distance in all directions utilizing iris scissors.
A-T Advancement Flap Text: The defect edges were debeveled with a #15 scalpel blade.  Given the location of the defect, shape of the defect and the proximity to free margins an A-T advancement flap was deemed most appropriate.  Using a sterile surgical marker, an appropriate advancement flap was drawn incorporating the defect and placing the expected incisions within the relaxed skin tension lines where possible.    The area thus outlined was incised deep to adipose tissue with a #15 scalpel blade.  The skin margins were undermined to an appropriate distance in all directions utilizing iris scissors.
O-T Advancement Flap Text: The defect edges were debeveled with a #15 scalpel blade.  Given the location of the defect, shape of the defect and the proximity to free margins an O-T advancement flap was deemed most appropriate.  Using a sterile surgical marker, an appropriate advancement flap was drawn incorporating the defect and placing the expected incisions within the relaxed skin tension lines where possible.    The area thus outlined was incised deep to adipose tissue with a #15 scalpel blade.  The skin margins were undermined to an appropriate distance in all directions utilizing iris scissors.
O-L Flap Text: The defect edges were debeveled with a #15 scalpel blade.  Given the location of the defect, shape of the defect and the proximity to free margins an O-L flap was deemed most appropriate.  Using a sterile surgical marker, an appropriate advancement flap was drawn incorporating the defect and placing the expected incisions within the relaxed skin tension lines where possible.    The area thus outlined was incised deep to adipose tissue with a #15 scalpel blade.  The skin margins were undermined to an appropriate distance in all directions utilizing iris scissors.
O-Z Flap Text: The defect edges were debeveled with a #15 scalpel blade. Given the location of the defect, shape of the defect and the proximity to free margins an O-Z flap was deemed most appropriate. Using a sterile surgical marker, an appropriate transposition flap was drawn incorporating the defect and placing the expected incisions within the relaxed skin tension lines where possible. The area thus outlined was incised deep to adipose tissue with a #15 scalpel blade. The skin margins were undermined to an appropriate distance in all directions utilizing iris scissors. Following this, the designed flap was carried over into the primary defect and sutured into place.
Double O-Z Flap Text: The defect edges were debeveled with a #15 scalpel blade. Given the location of the defect, shape of the defect and the proximity to free margins a Double O-Z flap was deemed most appropriate. Using a sterile surgical marker, an appropriate transposition flap was drawn incorporating the defect and placing the expected incisions within the relaxed skin tension lines where possible. The area thus outlined was incised deep to adipose tissue with a #15 scalpel blade. The skin margins were undermined to an appropriate distance in all directions utilizing iris scissors. Following this, the designed flap was carried over into the primary defect and sutured into place.
V-Y Flap Text: The defect edges were debeveled with a #15 scalpel blade.  Given the location of the defect, shape of the defect and the proximity to free margins a V-Y flap was deemed most appropriate.  Using a sterile surgical marker, an appropriate advancement flap was drawn incorporating the defect and placing the expected incisions within the relaxed skin tension lines where possible.    The area thus outlined was incised deep to adipose tissue with a #15 scalpel blade.  The skin margins were undermined to an appropriate distance in all directions utilizing iris scissors.
Advancement-Rotation Flap Text: The defect edges were debeveled with a #15 scalpel blade.  Given the location of the defect, shape of the defect and the proximity to free margins an advancement-rotation flap was deemed most appropriate.  Using a sterile surgical marker, an appropriate flap was drawn incorporating the defect and placing the expected incisions within the relaxed skin tension lines where possible. The area thus outlined was incised deep to adipose tissue with a #15 scalpel blade.  The skin margins were undermined to an appropriate distance in all directions utilizing iris scissors.
Mercedes Flap Text: The defect edges were debeveled with a #15 scalpel blade. Given the location of the defect, shape of the defect and the proximity to free margins a Mercedes flap was deemed most appropriate. Using a sterile surgical marker, an appropriate advancement flap was drawn incorporating the defect and placing the expected incisions within the relaxed skin tension lines where possible. The area thus outlined was incised deep to adipose tissue with a #15 scalpel blade. The skin margins were undermined to an appropriate distance in all directions utilizing iris scissors. Following this, the designed flap was advanced and carried over into the primary defect and sutured into place.
Modified Advancement Flap Text: The defect edges were debeveled with a #15 scalpel blade.  Given the location of the defect, shape of the defect and the proximity to free margins a modified advancement flap was deemed most appropriate.  Using a sterile surgical marker, an appropriate advancement flap was drawn incorporating the defect and placing the expected incisions within the relaxed skin tension lines where possible.    The area thus outlined was incised deep to adipose tissue with a #15 scalpel blade.  The skin margins were undermined to an appropriate distance in all directions utilizing iris scissors.
Mucosal Advancement Flap Text: Given the location of the defect, shape of the defect and the proximity to free margins a mucosal advancement flap was deemed most appropriate. Incisions were made with a 15 blade scalpel in the appropriate fashion along the cutaneous vermilion border and the mucosal lip. The remaining actinically damaged mucosal tissue was excised.  The mucosal advancement flap was then elevated to the gingival sulcus with care taken to preserve the neurovascular structures and advanced into the primary defect. Care was taken to ensure that precise realignment of the vermilion border was achieved.
Peng Advancement Flap Text: The defect edges were debeveled with a #15 scalpel blade. Given the location of the defect, shape of the defect and the proximity to free margins a Peng advancement flap was deemed most appropriate. Using a sterile surgical marker, an appropriate advancement flap was drawn incorporating the defect and placing the expected incisions within the relaxed skin tension lines where possible. The area thus outlined was incised deep to adipose tissue with a #15 scalpel blade. The skin margins were undermined to an appropriate distance in all directions utilizing iris scissors. Following this, the designed flap was advanced and carried over into the primary defect and sutured into place.
Hatchet Flap Text: The defect edges were debeveled with a #15 scalpel blade.  Given the location of the defect, shape of the defect and the proximity to free margins a hatchet flap was deemed most appropriate.  Using a sterile surgical marker, an appropriate hatchet flap was drawn incorporating the defect and placing the expected incisions within the relaxed skin tension lines where possible.    The area thus outlined was incised deep to adipose tissue with a #15 scalpel blade.  The skin margins were undermined to an appropriate distance in all directions utilizing iris scissors.
Rotation Flap Text: The defect edges were debeveled with a #15 scalpel blade.  Given the location of the defect, shape of the defect and the proximity to free margins a rotation flap was deemed most appropriate.  Using a sterile surgical marker, an appropriate rotation flap was drawn incorporating the defect and placing the expected incisions within the relaxed skin tension lines where possible.    The area thus outlined was incised deep to adipose tissue with a #15 scalpel blade.  The skin margins were undermined to an appropriate distance in all directions utilizing iris scissors.
Bilateral Rotation Flap Text: The defect edges were debeveled with a #15 scalpel blade. Given the location of the defect, shape of the defect and the proximity to free margins a bilateral rotation flap was deemed most appropriate. Using a sterile surgical marker, an appropriate rotation flap was drawn incorporating the defect and placing the expected incisions within the relaxed skin tension lines where possible. The area thus outlined was incised deep to adipose tissue with a #15 scalpel blade. The skin margins were undermined to an appropriate distance in all directions utilizing iris scissors. Following this, the designed flap was carried over into the primary defect and sutured into place.
Spiral Flap Text: The defect edges were debeveled with a #15 scalpel blade.  Given the location of the defect, shape of the defect and the proximity to free margins a spiral flap was deemed most appropriate.  Using a sterile surgical marker, an appropriate rotation flap was drawn incorporating the defect and placing the expected incisions within the relaxed skin tension lines where possible. The area thus outlined was incised deep to adipose tissue with a #15 scalpel blade.  The skin margins were undermined to an appropriate distance in all directions utilizing iris scissors.
Staged Advancement Flap Text: The defect edges were debeveled with a #15 scalpel blade. Given the location of the defect, shape of the defect and the proximity to free margins a staged advancement flap was deemed most appropriate. Using a sterile surgical marker, an appropriate advancement flap was drawn incorporating the defect and placing the expected incisions within the relaxed skin tension lines where possible. The area thus outlined was incised deep to adipose tissue with a #15 scalpel blade. The skin margins were undermined to an appropriate distance in all directions utilizing iris scissors. Following this, the designed flap was carried over into the primary defect and sutured into place.
Star Wedge Flap Text: The defect edges were debeveled with a #15 scalpel blade.  Given the location of the defect, shape of the defect and the proximity to free margins a star wedge flap was deemed most appropriate.  Using a sterile surgical marker, an appropriate rotation flap was drawn incorporating the defect and placing the expected incisions within the relaxed skin tension lines where possible. The area thus outlined was incised deep to adipose tissue with a #15 scalpel blade.  The skin margins were undermined to an appropriate distance in all directions utilizing iris scissors.
Transposition Flap Text: The defect edges were debeveled with a #15 scalpel blade.  Given the location of the defect and the proximity to free margins a transposition flap was deemed most appropriate.  Using a sterile surgical marker, an appropriate transposition flap was drawn incorporating the defect.    The area thus outlined was incised deep to adipose tissue with a #15 scalpel blade.  The skin margins were undermined to an appropriate distance in all directions utilizing iris scissors.
Muscle Hinge Flap Text: The defect edges were debeveled with a #15 scalpel blade.  Given the size, depth and location of the defect and the proximity to free margins a muscle hinge flap was deemed most appropriate.  Using a sterile surgical marker, an appropriate hinge flap was drawn incorporating the defect. The area thus outlined was incised with a #15 scalpel blade.  The skin margins were undermined to an appropriate distance in all directions utilizing iris scissors.
Mustarde Flap Text: The defect edges were debeveled with a #15 scalpel blade.  Given the size, depth and location of the defect and the proximity to free margins a Mustarde flap was deemed most appropriate. Using a sterile surgical marker, an appropriate flap was drawn incorporating the defect. The area thus outlined was incised with a #15 scalpel blade. The skin margins were undermined to an appropriate distance in all directions utilizing iris scissors. Following this, the designed flap was carried into the primary defect and sutured into place.
Nasal Turnover Hinge Flap Text: The defect edges were debeveled with a #15 scalpel blade.  Given the size, depth, location of the defect and the defect being full thickness a nasal turnover hinge flap was deemed most appropriate. Using a sterile surgical marker, an appropriate hinge flap was drawn incorporating the defect. The area thus outlined was incised with a #15 scalpel blade. The flap was designed to recreate the nasal mucosal lining and the alar rim. The skin margins were undermined to an appropriate distance in all directions utilizing iris scissors. Following this, the designed flap was carried over into the primary defect and sutured into place
Nasalis-Muscle-Based Myocutaneous Island Pedicle Flap Text: Using a #15 blade, an incision was made around the donor flap to the level of the nasalis muscle. Wide lateral undermining was then performed in both the subcutaneous plane above the nasalis muscle, and in a submuscular plane just above periosteum. This allowed the formation of a free nasalis muscle axial pedicle (based on the angular artery) which was still attached to the actual cutaneous flap, increasing its mobility and vascular viability. Hemostasis was obtained with pinpoint electrocoagulation. The flap was mobilized into position and the pivotal anchor points positioned and stabilized with buried interrupted sutures. Subcutaneous and dermal tissues were closed in a multilayered fashion with sutures. Tissue redundancies were excised, and the epidermal edges were apposed without significant tension and sutured with sutures.
Orbicularis Oris Muscle Flap Text: The defect edges were debeveled with a #15 scalpel blade.  Given that the defect affected the competency of the oral sphincter an orbicularis oris muscle flap was deemed most appropriate to restore this competency and normal muscle function.  Using a sterile surgical marker, an appropriate flap was drawn incorporating the defect. The area thus outlined was incised with a #15 scalpel blade. Following this, the designed flap was carried over into the primary defect and sutured into place.
Melolabial Transposition Flap Text: The defect edges were debeveled with a #15 scalpel blade.  Given the location of the defect and the proximity to free margins a melolabial flap was deemed most appropriate.  Using a sterile surgical marker, an appropriate melolabial transposition flap was drawn incorporating the defect.    The area thus outlined was incised deep to adipose tissue with a #15 scalpel blade.  The skin margins were undermined to an appropriate distance in all directions utilizing iris scissors.
Rhombic Flap Text: The defect edges were debeveled with a #15 scalpel blade.  Given the location of the defect and the proximity to free margins a rhombic flap was deemed most appropriate.  Using a sterile surgical marker, an appropriate rhombic flap was drawn incorporating the defect.    The area thus outlined was incised deep to adipose tissue with a #15 scalpel blade.  The skin margins were undermined to an appropriate distance in all directions utilizing iris scissors.
Rhomboid Transposition Flap Text: The defect edges were debeveled with a #15 scalpel blade. Given the location of the defect and the proximity to free margins a rhomboid transposition flap was deemed most appropriate. Using a sterile surgical marker, an appropriate rhomboid flap was drawn incorporating the defect. The area thus outlined was incised deep to adipose tissue with a #15 scalpel blade. The skin margins were undermined to an appropriate distance in all directions utilizing iris scissors. Following this, the designed flap was carried over into the primary defect and sutured into place.
Bi-Rhombic Flap Text: The defect edges were debeveled with a #15 scalpel blade.  Given the location of the defect and the proximity to free margins a bi-rhombic flap was deemed most appropriate.  Using a sterile surgical marker, an appropriate rhombic flap was drawn incorporating the defect. The area thus outlined was incised deep to adipose tissue with a #15 scalpel blade.  The skin margins were undermined to an appropriate distance in all directions utilizing iris scissors.
Helical Rim Advancement Flap Text: The defect edges were debeveled with a #15 blade scalpel.  Given the location of the defect and the proximity to free margins (helical rim) a double helical rim advancement flap was deemed most appropriate.  Using a sterile surgical marker, the appropriate advancement flaps were drawn incorporating the defect and placing the expected incisions between the helical rim and antihelix where possible.  The area thus outlined was incised through and through with a #15 scalpel blade.  With a skin hook and iris scissors, the flaps were gently and sharply undermined and freed up.
Bilateral Helical Rim Advancement Flap Text: The defect edges were debeveled with a #15 blade scalpel.  Given the location of the defect and the proximity to free margins (helical rim) a bilateral helical rim advancement flap was deemed most appropriate.  Using a sterile surgical marker, the appropriate advancement flaps were drawn incorporating the defect and placing the expected incisions between the helical rim and antihelix where possible.  The area thus outlined was incised through and through with a #15 scalpel blade.  With a skin hook and iris scissors, the flaps were gently and sharply undermined and freed up.
Ear Star Wedge Flap Text: The defect edges were debeveled with a #15 blade scalpel.  Given the location of the defect and the proximity to free margins (helical rim) an ear star wedge flap was deemed most appropriate.  Using a sterile surgical marker, the appropriate flap was drawn incorporating the defect and placing the expected incisions between the helical rim and antihelix where possible.  The area thus outlined was incised through and through with a #15 scalpel blade.
Banner Transposition Flap Text: The defect edges were debeveled with a #15 scalpel blade. Given the location of the defect and the proximity to free margins a Banner transposition flap was deemed most appropriate. Using a sterile surgical marker, an appropriate flap was drawn around the defect. The area thus outlined was incised deep to adipose tissue with a #15 scalpel blade. The skin margins were undermined to an appropriate distance in all directions utilizing iris scissors. Following this, the designed flap was carried into the primary defect and sutured into place.
Bilobed Flap Text: The defect edges were debeveled with a #15 scalpel blade.  Given the location of the defect and the proximity to free margins a bilobe flap was deemed most appropriate.  Using a sterile surgical marker, an appropriate bilobe flap drawn around the defect.    The area thus outlined was incised deep to adipose tissue with a #15 scalpel blade.  The skin margins were undermined to an appropriate distance in all directions utilizing iris scissors.
Bilobed Transposition Flap Text: The defect edges were debeveled with a #15 scalpel blade.  Given the location of the defect and the proximity to free margins a bilobed transposition flap was deemed most appropriate.  Using a sterile surgical marker, an appropriate bilobe flap drawn around the defect.    The area thus outlined was incised deep to adipose tissue with a #15 scalpel blade.  The skin margins were undermined to an appropriate distance in all directions utilizing iris scissors.
Trilobed Flap Text: The defect edges were debeveled with a #15 scalpel blade.  Given the location of the defect and the proximity to free margins a trilobed flap was deemed most appropriate.  Using a sterile surgical marker, an appropriate trilobed flap drawn around the defect.    The area thus outlined was incised deep to adipose tissue with a #15 scalpel blade.  The skin margins were undermined to an appropriate distance in all directions utilizing iris scissors.
Dorsal Nasal Flap Text: The defect edges were debeveled with a #15 scalpel blade.  Given the location of the defect and the proximity to free margins a dorsal nasal flap was deemed most appropriate.  Using a sterile surgical marker, an appropriate dorsal nasal flap was drawn around the defect.    The area thus outlined was incised deep to adipose tissue with a #15 scalpel blade.  The skin margins were undermined to an appropriate distance in all directions utilizing iris scissors.
Island Pedicle Flap Text: The defect edges were debeveled with a #15 scalpel blade.  Given the location of the defect, shape of the defect and the proximity to free margins an island pedicle advancement flap was deemed most appropriate.  Using a sterile surgical marker, an appropriate advancement flap was drawn incorporating the defect, outlining the appropriate donor tissue and placing the expected incisions within the relaxed skin tension lines where possible.    The area thus outlined was incised deep to adipose tissue with a #15 scalpel blade.  The skin margins were undermined to an appropriate distance in all directions around the primary defect and laterally outward around the island pedicle utilizing iris scissors.  There was minimal undermining beneath the pedicle flap.
Island Pedicle Flap With Canthal Suspension Text: The defect edges were debeveled with a #15 scalpel blade.  Given the location of the defect, shape of the defect and the proximity to free margins an island pedicle advancement flap was deemed most appropriate.  Using a sterile surgical marker, an appropriate advancement flap was drawn incorporating the defect, outlining the appropriate donor tissue and placing the expected incisions within the relaxed skin tension lines where possible. The area thus outlined was incised deep to adipose tissue with a #15 scalpel blade.  The skin margins were undermined to an appropriate distance in all directions around the primary defect and laterally outward around the island pedicle utilizing iris scissors.  There was minimal undermining beneath the pedicle flap. A suspension suture was placed in the canthal tendon to prevent tension and prevent ectropion.
Alar Island Pedicle Flap Text: The defect edges were debeveled with a #15 scalpel blade.  Given the location of the defect, shape of the defect and the proximity to the alar rim an island pedicle advancement flap was deemed most appropriate.  Using a sterile surgical marker, an appropriate advancement flap was drawn incorporating the defect, outlining the appropriate donor tissue and placing the expected incisions within the nasal ala running parallel to the alar rim. The area thus outlined was incised with a #15 scalpel blade.  The skin margins were undermined minimally to an appropriate distance in all directions around the primary defect and laterally outward around the island pedicle utilizing iris scissors.  There was minimal undermining beneath the pedicle flap.
Double Island Pedicle Flap Text: The defect edges were debeveled with a #15 scalpel blade.  Given the location of the defect, shape of the defect and the proximity to free margins a double island pedicle advancement flap was deemed most appropriate.  Using a sterile surgical marker, an appropriate advancement flap was drawn incorporating the defect, outlining the appropriate donor tissue and placing the expected incisions within the relaxed skin tension lines where possible.    The area thus outlined was incised deep to adipose tissue with a #15 scalpel blade.  The skin margins were undermined to an appropriate distance in all directions around the primary defect and laterally outward around the island pedicle utilizing iris scissors.  There was minimal undermining beneath the pedicle flap.
Island Pedicle Flap-Requiring Vessel Identification Text: The defect edges were debeveled with a #15 scalpel blade.  Given the location of the defect, shape of the defect and the proximity to free margins an island pedicle advancement flap was deemed most appropriate.  Using a sterile surgical marker, an appropriate advancement flap was drawn, based on the axial vessel mentioned above, incorporating the defect, outlining the appropriate donor tissue and placing the expected incisions within the relaxed skin tension lines where possible.    The area thus outlined was incised deep to adipose tissue with a #15 scalpel blade.  The skin margins were undermined to an appropriate distance in all directions around the primary defect and laterally outward around the island pedicle utilizing iris scissors.  There was minimal undermining beneath the pedicle flap.
Keystone Flap Text: The defect edges were debeveled with a #15 scalpel blade.  Given the location of the defect, shape of the defect a keystone flap was deemed most appropriate.  Using a sterile surgical marker, an appropriate keystone flap was drawn incorporating the defect, outlining the appropriate donor tissue and placing the expected incisions within the relaxed skin tension lines where possible. The area thus outlined was incised deep to adipose tissue with a #15 scalpel blade.  The skin margins were undermined to an appropriate distance in all directions around the primary defect and laterally outward around the flap utilizing iris scissors.
O-T Plasty Text: The defect edges were debeveled with a #15 scalpel blade.  Given the location of the defect, shape of the defect and the proximity to free margins an O-T plasty was deemed most appropriate.  Using a sterile surgical marker, an appropriate O-T plasty was drawn incorporating the defect and placing the expected incisions within the relaxed skin tension lines where possible.    The area thus outlined was incised deep to adipose tissue with a #15 scalpel blade.  The skin margins were undermined to an appropriate distance in all directions utilizing iris scissors.
O-Z Plasty Text: The defect edges were debeveled with a #15 scalpel blade.  Given the location of the defect, shape of the defect and the proximity to free margins an O-Z plasty (double transposition flap) was deemed most appropriate.  Using a sterile surgical marker, the appropriate transposition flaps were drawn incorporating the defect and placing the expected incisions within the relaxed skin tension lines where possible.    The area thus outlined was incised deep to adipose tissue with a #15 scalpel blade.  The skin margins were undermined to an appropriate distance in all directions utilizing iris scissors.  Hemostasis was achieved with electrocautery.  The flaps were then transposed into place, one clockwise and the other counterclockwise, and anchored with interrupted buried subcutaneous sutures.
Double O-Z Plasty Text: The defect edges were debeveled with a #15 scalpel blade. Given the location of the defect, shape of the defect and the proximity to free margins a Double O-Z plasty (double transposition flap) was deemed most appropriate. Using a sterile surgical marker, the appropriate transposition flaps were drawn incorporating the defect and placing the expected incisions within the relaxed skin tension lines where possible. The area thus outlined was incised deep to adipose tissue with a #15 scalpel blade. The skin margins were undermined to an appropriate distance in all directions utilizing iris scissors. Hemostasis was achieved with electrocautery. The flaps were then transposed and carried over into place, one clockwise and the other counterclockwise, and anchored with interrupted buried subcutaneous sutures.
V-Y Plasty Text: The defect edges were debeveled with a #15 scalpel blade.  Given the location of the defect, shape of the defect and the proximity to free margins an V-Y advancement flap was deemed most appropriate.  Using a sterile surgical marker, an appropriate advancement flap was drawn incorporating the defect and placing the expected incisions within the relaxed skin tension lines where possible.    The area thus outlined was incised deep to adipose tissue with a #15 scalpel blade.  The skin margins were undermined to an appropriate distance in all directions utilizing iris scissors.
H Plasty Text: Given the location of the defect, shape of the defect and the proximity to free margins a H-plasty was deemed most appropriate for repair.  Using a sterile surgical marker, the appropriate advancement arms of the H-plasty were drawn incorporating the defect and placing the expected incisions within the relaxed skin tension lines where possible. The area thus outlined was incised deep to adipose tissue with a #15 scalpel blade. The skin margins were undermined to an appropriate distance in all directions utilizing iris scissors.  The opposing advancement arms were then advanced into place in opposite direction and anchored with interrupted buried subcutaneous sutures.
W Plasty Text: The lesion was extirpated to the level of the fat with a #15 scalpel blade.  Given the location of the defect, shape of the defect and the proximity to free margins a W-plasty was deemed most appropriate for repair.  Using a sterile surgical marker, the appropriate transposition arms of the W-plasty were drawn incorporating the defect and placing the expected incisions within the relaxed skin tension lines where possible.    The area thus outlined was incised deep to adipose tissue with a #15 scalpel blade.  The skin margins were undermined to an appropriate distance in all directions utilizing iris scissors.  The opposing transposition arms were then transposed into place in opposite direction and anchored with interrupted buried subcutaneous sutures.
Z Plasty Text: The lesion was extirpated to the level of the fat with a #15 scalpel blade.  Given the location of the defect, shape of the defect and the proximity to free margins a Z-plasty was deemed most appropriate for repair.  Using a sterile surgical marker, the appropriate transposition arms of the Z-plasty were drawn incorporating the defect and placing the expected incisions within the relaxed skin tension lines where possible.    The area thus outlined was incised deep to adipose tissue with a #15 scalpel blade.  The skin margins were undermined to an appropriate distance in all directions utilizing iris scissors.  The opposing transposition arms were then transposed into place in opposite direction and anchored with interrupted buried subcutaneous sutures.
Double Z Plasty Text: The lesion was extirpated to the level of the fat with a #15 scalpel blade. Given the location of the defect, shape of the defect and the proximity to free margins a double Z-plasty was deemed most appropriate for repair. Using a sterile surgical marker, the appropriate transposition arms of the double Z-plasty were drawn incorporating the defect and placing the expected incisions within the relaxed skin tension lines where possible. The area thus outlined was incised deep to adipose tissue with a #15 scalpel blade. The skin margins were undermined to an appropriate distance in all directions utilizing iris scissors. The opposing transposition arms were then transposed and carried over into place in opposite direction and anchored with interrupted buried subcutaneous sutures.
Zygomaticofacial Flap Text: Given the location of the defect, shape of the defect and the proximity to free margins a zygomaticofacial flap was deemed most appropriate for repair. Using a sterile surgical marker, the appropriate flap was drawn incorporating the defect and placing the expected incisions within the relaxed skin tension lines where possible. The area thus outlined was incised deep to adipose tissue with a #15 scalpel blade with preservation of a vascular pedicle.  The skin margins were undermined to an appropriate distance in all directions utilizing iris scissors. The flap was then carried over into the defect and anchored with interrupted buried subcutaneous sutures.
Cheek Interpolation Flap Text: A decision was made to reconstruct the defect utilizing an interpolation axial flap and a staged reconstruction.  A telfa template was made of the defect.  This telfa template was then used to outline the Cheek Interpolation flap.  The donor area for the pedicle flap was then injected with anesthesia.  The flap was excised through the skin and subcutaneous tissue down to the layer of the underlying musculature.  The interpolation flap was carefully excised within this deep plane to maintain its blood supply.  The edges of the donor site were undermined.   The donor site was closed in a primary fashion.  The pedicle was then rotated into position and sutured.  Once the tube was sutured into place, adequate blood supply was confirmed with blanching and refill.  The pedicle was then wrapped with xeroform gauze and dressed appropriately with a telfa and gauze bandage to ensure continued blood supply and protect the attached pedicle.
Cheek-To-Nose Interpolation Flap Text: A decision was made to reconstruct the defect utilizing an interpolation axial flap and a staged reconstruction.  A telfa template was made of the defect.  This telfa template was then used to outline the Cheek-To-Nose Interpolation flap.  The donor area for the pedicle flap was then injected with anesthesia.  The flap was excised through the skin and subcutaneous tissue down to the layer of the underlying musculature.  The interpolation flap was carefully excised within this deep plane to maintain its blood supply.  The edges of the donor site were undermined.   The donor site was closed in a primary fashion.  The pedicle was then rotated into position and sutured.  Once the tube was sutured into place, adequate blood supply was confirmed with blanching and refill.  The pedicle was then wrapped with xeroform gauze and dressed appropriately with a telfa and gauze bandage to ensure continued blood supply and protect the attached pedicle.
Interpolation Flap Text: A decision was made to reconstruct the defect utilizing an interpolation axial flap and a staged reconstruction.  A telfa template was made of the defect.  This telfa template was then used to outline the interpolation flap.  The donor area for the pedicle flap was then injected with anesthesia.  The flap was excised through the skin and subcutaneous tissue down to the layer of the underlying musculature.  The interpolation flap was carefully excised within this deep plane to maintain its blood supply.  The edges of the donor site were undermined.   The donor site was closed in a primary fashion.  The pedicle was then rotated into position and sutured.  Once the tube was sutured into place, adequate blood supply was confirmed with blanching and refill.  The pedicle was then wrapped with xeroform gauze and dressed appropriately with a telfa and gauze bandage to ensure continued blood supply and protect the attached pedicle.
Melolabial Interpolation Flap Text: A decision was made to reconstruct the defect utilizing an interpolation axial flap and a staged reconstruction.  A telfa template was made of the defect.  This telfa template was then used to outline the melolabial interpolation flap.  The donor area for the pedicle flap was then injected with anesthesia.  The flap was excised through the skin and subcutaneous tissue down to the layer of the underlying musculature.  The pedicle flap was carefully excised within this deep plane to maintain its blood supply.  The edges of the donor site were undermined.   The donor site was closed in a primary fashion.  The pedicle was then rotated into position and sutured.  Once the tube was sutured into place, adequate blood supply was confirmed with blanching and refill.  The pedicle was then wrapped with xeroform gauze and dressed appropriately with a telfa and gauze bandage to ensure continued blood supply and protect the attached pedicle.
Mastoid Interpolation Flap Text: A decision was made to reconstruct the defect utilizing an interpolation axial flap and a staged reconstruction.  A telfa template was made of the defect.  This telfa template was then used to outline the mastoid interpolation flap.  The donor area for the pedicle flap was then injected with anesthesia.  The flap was excised through the skin and subcutaneous tissue down to the layer of the underlying musculature.  The pedicle flap was carefully excised within this deep plane to maintain its blood supply.  The edges of the donor site were undermined.   The donor site was closed in a primary fashion.  The pedicle was then rotated into position and sutured.  Once the tube was sutured into place, adequate blood supply was confirmed with blanching and refill.  The pedicle was then wrapped with xeroform gauze and dressed appropriately with a telfa and gauze bandage to ensure continued blood supply and protect the attached pedicle.
Posterior Auricular Interpolation Flap Text: A decision was made to reconstruct the defect utilizing an interpolation axial flap and a staged reconstruction.  A telfa template was made of the defect.  This telfa template was then used to outline the posterior auricular interpolation flap.  The donor area for the pedicle flap was then injected with anesthesia.  The flap was excised through the skin and subcutaneous tissue down to the layer of the underlying musculature.  The pedicle flap was carefully excised within this deep plane to maintain its blood supply.  The edges of the donor site were undermined.   The donor site was closed in a primary fashion.  The pedicle was then rotated into position and sutured.  Once the tube was sutured into place, adequate blood supply was confirmed with blanching and refill.  The pedicle was then wrapped with xeroform gauze and dressed appropriately with a telfa and gauze bandage to ensure continued blood supply and protect the attached pedicle.
Paramedian Forehead Flap Text: A decision was made to reconstruct the defect utilizing an interpolation axial flap and a staged reconstruction.  A telfa template was made of the defect.  This telfa template was then used to outline the paramedian forehead pedicle flap.  The donor area for the pedicle flap was then injected with anesthesia.  The flap was excised through the skin and subcutaneous tissue down to the layer of the underlying musculature.  The pedicle flap was carefully excised within this deep plane to maintain its blood supply.  The edges of the donor site were undermined.   The donor site was closed in a primary fashion.  The pedicle was then rotated into position and sutured.  Once the tube was sutured into place, adequate blood supply was confirmed with blanching and refill.  The pedicle was then wrapped with xeroform gauze and dressed appropriately with a telfa and gauze bandage to ensure continued blood supply and protect the attached pedicle.
Abbe Flap (Upper To Lower Lip) Text: The defect of the lower lip was assessed and measured.  Given the location and size of the defect, an Abbe flap was deemed most appropriate. Using a sterile surgical marker, an appropriate Abbe flap was measured and drawn on the upper lip. Local anesthesia was then infiltrated.  A scalpel was then used to incise the upper lip through and through the skin, vermilion, muscle and mucosa, leaving the flap pedicled on the opposite side.  The flap was then rotated and transferred to the lower lip defect.  The flap was then sutured into place with a three layer technique, closing the orbicularis oris muscle layer with subcutaneous buried sutures, followed by a mucosal layer and an epidermal layer.
Abbe Flap (Lower To Upper Lip) Text: The defect of the upper lip was assessed and measured.  Given the location and size of the defect, an Abbe flap was deemed most appropriate. Using a sterile surgical marker, an appropriate Abbe flap was measured and drawn on the lower lip. Local anesthesia was then infiltrated. A scalpel was then used to incise the upper lip through and through the skin, vermilion, muscle and mucosa, leaving the flap pedicled on the opposite side.  The flap was then rotated and transferred to the lower lip defect.  The flap was then sutured into place with a three layer technique, closing the orbicularis oris muscle layer with subcutaneous buried sutures, followed by a mucosal layer and an epidermal layer.
Estlander Flap (Upper To Lower Lip) Text: The defect of the lower lip was assessed and measured.  Given the location and size of the defect, an Estlander flap was deemed most appropriate. Using a sterile surgical marker, an appropriate Estlander flap was measured and drawn on the upper lip. Local anesthesia was then infiltrated. A scalpel was then used to incise the lateral aspect of the flap, through skin, muscle and mucosa, leaving the flap pedicled medially.  The flap was then rotated and positioned to fill the lower lip defect.  The flap was then sutured into place with a three layer technique, closing the orbicularis oris muscle layer with subcutaneous buried sutures, followed by a mucosal layer and an epidermal layer.
Lip Wedge Excision Repair Text: Given the location of the defect and the proximity to free margins a full thickness wedge repair was deemed most appropriate.  Using a sterile surgical marker, the appropriate repair was drawn incorporating the defect and placing the expected incisions perpendicular to the vermilion border.  The vermilion border was also meticulously outlined to ensure appropriate reapproximation during the repair.  The area thus outlined was incised through and through with a #15 scalpel blade.  The muscularis and dermis were reaproximated with deep sutures following hemostasis. Care was taken to realign the vermilion border before proceeding with the superficial closure.  Once the vermilion was realigned the superfical and mucosal closure was finished.
Ftsg Text: The defect edges were debeveled with a #15 scalpel blade.  Given the location of the defect, shape of the defect and the proximity to free margins a full thickness skin graft was deemed most appropriate.  Using a sterile surgical marker, the primary defect shape was transferred to the donor site. The area thus outlined was incised deep to adipose tissue with a #15 scalpel blade.  The harvested graft was then trimmed of adipose tissue until only dermis and epidermis was left.  The skin margins of the secondary defect were undermined to an appropriate distance in all directions utilizing iris scissors.  The secondary defect was closed with interrupted buried subcutaneous sutures.  The skin edges were then re-apposed with running  sutures.  The skin graft was then placed in the primary defect and oriented appropriately.
Split-Thickness Skin Graft Text: The defect edges were debeveled with a #15 scalpel blade.  Given the location of the defect, shape of the defect and the proximity to free margins a split thickness skin graft was deemed most appropriate.  Using a sterile surgical marker, the primary defect shape was transferred to the donor site. The split thickness graft was then harvested.  The skin graft was then placed in the primary defect and oriented appropriately.
Pinch Graft Text: The defect edges were debeveled with a #15 scalpel blade. Given the location of the defect, shape of the defect and the proximity to free margins a pinch graft was deemed most appropriate. Using a sterile surgical marker, the primary defect shape was transferred to the donor site. The area thus outlined was incised deep to adipose tissue with a #15 scalpel blade.  The harvested graft was then trimmed of adipose tissue until only dermis and epidermis was left. The skin margins of the secondary defect were undermined to an appropriate distance in all directions utilizing iris scissors.  The secondary defect was closed with interrupted buried subcutaneous sutures.  The skin edges were then re-apposed with running  sutures.  The skin graft was then placed in the primary defect and oriented appropriately.
Burow's Graft Text: The defect edges were debeveled with a #15 scalpel blade. Given the location of the defect, shape of the defect, the proximity to free margins and the presence of a standing cone deformity a Burow's skin graft was deemed most appropriate. The standing cone was removed and this tissue was then trimmed to the shape of the primary defect. The adipose tissue was also removed until only dermis and epidermis were left.  The skin margins of the secondary defect were undermined to an appropriate distance in all directions utilizing iris scissors.  The secondary defect was closed with interrupted buried subcutaneous sutures.  The skin edges were then re-apposed with running  sutures.  The skin graft was then placed in the primary defect and oriented appropriately.
Cartilage Graft Text: The defect edges were debeveled with a #15 scalpel blade.  Given the location of the defect, shape of the defect, the fact the defect involved a full thickness cartilage defect a cartilage graft was deemed most appropriate.  An appropriate donor site was identified, cleansed, and anesthetized. The cartilage graft was then harvested and transferred to the recipient site, oriented appropriately and then sutured into place.  The secondary defect was then repaired using a primary closure.
Composite Graft Text: The defect edges were debeveled with a #15 scalpel blade.  Given the location of the defect, shape of the defect, the proximity to free margins and the fact the defect was full thickness a composite graft was deemed most appropriate.  The defect was outline and then transferred to the donor site.  A full thickness graft was then excised from the donor site. The graft was then placed in the primary defect, oriented appropriately and then sutured into place.  The secondary defect was then repaired using a primary closure.
Epidermal Autograft Text: The defect edges were debeveled with a #15 scalpel blade.  Given the location of the defect, shape of the defect and the proximity to free margins an epidermal autograft was deemed most appropriate.  Using a sterile surgical marker, the primary defect shape was transferred to the donor site. The epidermal graft was then harvested.  The skin graft was then placed in the primary defect and oriented appropriately.
Dermal Autograft Text: The defect edges were debeveled with a #15 scalpel blade.  Given the location of the defect, shape of the defect and the proximity to free margins a dermal autograft was deemed most appropriate.  Using a sterile surgical marker, the primary defect shape was transferred to the donor site. The area thus outlined was incised deep to adipose tissue with a #15 scalpel blade.  The harvested graft was then trimmed of adipose and epidermal tissue until only dermis was left.  The skin graft was then placed in the primary defect and oriented appropriately.
Skin Substitute Text: The defect edges were debeveled with a #15 scalpel blade.  Given the location of the defect, shape of the defect and the proximity to free margins a skin substitute graft was deemed most appropriate.  The graft material was trimmed to fit the size of the defect. The graft was then placed in the primary defect and oriented appropriately.
Tissue Cultured Epidermal Autograft Text: The defect edges were debeveled with a #15 scalpel blade.  Given the location of the defect, shape of the defect and the proximity to free margins a tissue cultured epidermal autograft was deemed most appropriate.  The graft was then trimmed to fit the size of the defect.  The graft was then placed in the primary defect and oriented appropriately.
Xenograft Text: The defect edges were debeveled with a #15 scalpel blade.  Given the location of the defect, shape of the defect and the proximity to free margins a xenograft was deemed most appropriate.  The graft was then trimmed to fit the size of the defect.  The graft was then placed in the primary defect and oriented appropriately.
Purse String (Intermediate) Text: Given the location of the defect and the characteristics of the surrounding skin a purse string intermediate closure was deemed most appropriate.  Undermining was performed circumfirentially around the surgical defect.  A purse string suture was then placed and tightened.
Purse String (Simple) Text: Given the location of the defect and the characteristics of the surrounding skin a purse string simple closure was deemed most appropriate.  Undermining was performed circumferentially around the surgical defect.  A purse string suture was then placed and tightened.
Partial Purse String (Intermediate) Text: Given the location of the defect and the characteristics of the surrounding skin an intermediate purse string closure was deemed most appropriate.  Undermining was performed circumferentially around the surgical defect.  A purse string suture was then placed and tightened. Wound tension of the circular defect prevented complete closure of the wound.
Partial Purse String (Simple) Text: Given the location of the defect and the characteristics of the surrounding skin a simple purse string closure was deemed most appropriate.  Undermining was performed circumferentially around the surgical defect.  A purse string suture was then placed and tightened. Wound tension of the circular defect prevented complete closure of the wound.
Complex Repair And Single Advancement Flap Text: The defect edges were debeveled with a #15 scalpel blade.  The primary defect was closed partially with a complex linear closure.  Given the location of the remaining defect, shape of the defect and the proximity to free margins a single advancement flap was deemed most appropriate for complete closure of the defect.  Using a sterile surgical marker, an appropriate advancement flap was drawn incorporating the defect and placing the expected incisions within the relaxed skin tension lines where possible.    The area thus outlined was incised deep to adipose tissue with a #15 scalpel blade.  The skin margins were undermined to an appropriate distance in all directions utilizing iris scissors.
Complex Repair And Double Advancement Flap Text: The defect edges were debeveled with a #15 scalpel blade.  The primary defect was closed partially with a complex linear closure.  Given the location of the remaining defect, shape of the defect and the proximity to free margins a double advancement flap was deemed most appropriate for complete closure of the defect.  Using a sterile surgical marker, an appropriate advancement flap was drawn incorporating the defect and placing the expected incisions within the relaxed skin tension lines where possible.    The area thus outlined was incised deep to adipose tissue with a #15 scalpel blade.  The skin margins were undermined to an appropriate distance in all directions utilizing iris scissors.
Complex Repair And Modified Advancement Flap Text: The defect edges were debeveled with a #15 scalpel blade.  The primary defect was closed partially with a complex linear closure.  Given the location of the remaining defect, shape of the defect and the proximity to free margins a modified advancement flap was deemed most appropriate for complete closure of the defect.  Using a sterile surgical marker, an appropriate advancement flap was drawn incorporating the defect and placing the expected incisions within the relaxed skin tension lines where possible.    The area thus outlined was incised deep to adipose tissue with a #15 scalpel blade.  The skin margins were undermined to an appropriate distance in all directions utilizing iris scissors.
Complex Repair And A-T Advancement Flap Text: The defect edges were debeveled with a #15 scalpel blade.  The primary defect was closed partially with a complex linear closure.  Given the location of the remaining defect, shape of the defect and the proximity to free margins an A-T advancement flap was deemed most appropriate for complete closure of the defect.  Using a sterile surgical marker, an appropriate advancement flap was drawn incorporating the defect and placing the expected incisions within the relaxed skin tension lines where possible.    The area thus outlined was incised deep to adipose tissue with a #15 scalpel blade.  The skin margins were undermined to an appropriate distance in all directions utilizing iris scissors.
Complex Repair And O-T Advancement Flap Text: The defect edges were debeveled with a #15 scalpel blade.  The primary defect was closed partially with a complex linear closure.  Given the location of the remaining defect, shape of the defect and the proximity to free margins an O-T advancement flap was deemed most appropriate for complete closure of the defect.  Using a sterile surgical marker, an appropriate advancement flap was drawn incorporating the defect and placing the expected incisions within the relaxed skin tension lines where possible.    The area thus outlined was incised deep to adipose tissue with a #15 scalpel blade.  The skin margins were undermined to an appropriate distance in all directions utilizing iris scissors.
Complex Repair And O-L Flap Text: The defect edges were debeveled with a #15 scalpel blade.  The primary defect was closed partially with a complex linear closure.  Given the location of the remaining defect, shape of the defect and the proximity to free margins an O-L flap was deemed most appropriate for complete closure of the defect.  Using a sterile surgical marker, an appropriate flap was drawn incorporating the defect and placing the expected incisions within the relaxed skin tension lines where possible.    The area thus outlined was incised deep to adipose tissue with a #15 scalpel blade.  The skin margins were undermined to an appropriate distance in all directions utilizing iris scissors.
Complex Repair And Bilobe Flap Text: The defect edges were debeveled with a #15 scalpel blade.  The primary defect was closed partially with a complex linear closure.  Given the location of the remaining defect, shape of the defect and the proximity to free margins a bilobe flap was deemed most appropriate for complete closure of the defect.  Using a sterile surgical marker, an appropriate advancement flap was drawn incorporating the defect and placing the expected incisions within the relaxed skin tension lines where possible.    The area thus outlined was incised deep to adipose tissue with a #15 scalpel blade.  The skin margins were undermined to an appropriate distance in all directions utilizing iris scissors.
Complex Repair And Melolabial Flap Text: The defect edges were debeveled with a #15 scalpel blade.  The primary defect was closed partially with a complex linear closure.  Given the location of the remaining defect, shape of the defect and the proximity to free margins a melolabial flap was deemed most appropriate for complete closure of the defect.  Using a sterile surgical marker, an appropriate advancement flap was drawn incorporating the defect and placing the expected incisions within the relaxed skin tension lines where possible.    The area thus outlined was incised deep to adipose tissue with a #15 scalpel blade.  The skin margins were undermined to an appropriate distance in all directions utilizing iris scissors.
Complex Repair And Rotation Flap Text: The defect edges were debeveled with a #15 scalpel blade.  The primary defect was closed partially with a complex linear closure.  Given the location of the remaining defect, shape of the defect and the proximity to free margins a rotation flap was deemed most appropriate for complete closure of the defect.  Using a sterile surgical marker, an appropriate advancement flap was drawn incorporating the defect and placing the expected incisions within the relaxed skin tension lines where possible.    The area thus outlined was incised deep to adipose tissue with a #15 scalpel blade.  The skin margins were undermined to an appropriate distance in all directions utilizing iris scissors.
Complex Repair And Rhombic Flap Text: The defect edges were debeveled with a #15 scalpel blade.  The primary defect was closed partially with a complex linear closure.  Given the location of the remaining defect, shape of the defect and the proximity to free margins a rhombic flap was deemed most appropriate for complete closure of the defect.  Using a sterile surgical marker, an appropriate advancement flap was drawn incorporating the defect and placing the expected incisions within the relaxed skin tension lines where possible.    The area thus outlined was incised deep to adipose tissue with a #15 scalpel blade.  The skin margins were undermined to an appropriate distance in all directions utilizing iris scissors.
Complex Repair And Transposition Flap Text: The defect edges were debeveled with a #15 scalpel blade.  The primary defect was closed partially with a complex linear closure.  Given the location of the remaining defect, shape of the defect and the proximity to free margins a transposition flap was deemed most appropriate for complete closure of the defect.  Using a sterile surgical marker, an appropriate advancement flap was drawn incorporating the defect and placing the expected incisions within the relaxed skin tension lines where possible.    The area thus outlined was incised deep to adipose tissue with a #15 scalpel blade.  The skin margins were undermined to an appropriate distance in all directions utilizing iris scissors.
Complex Repair And V-Y Plasty Text: The defect edges were debeveled with a #15 scalpel blade.  The primary defect was closed partially with a complex linear closure.  Given the location of the remaining defect, shape of the defect and the proximity to free margins a V-Y plasty was deemed most appropriate for complete closure of the defect.  Using a sterile surgical marker, an appropriate advancement flap was drawn incorporating the defect and placing the expected incisions within the relaxed skin tension lines where possible.    The area thus outlined was incised deep to adipose tissue with a #15 scalpel blade.  The skin margins were undermined to an appropriate distance in all directions utilizing iris scissors.
Complex Repair And M Plasty Text: The defect edges were debeveled with a #15 scalpel blade.  The primary defect was closed partially with a complex linear closure.  Given the location of the remaining defect, shape of the defect and the proximity to free margins an M plasty was deemed most appropriate for complete closure of the defect.  Using a sterile surgical marker, an appropriate advancement flap was drawn incorporating the defect and placing the expected incisions within the relaxed skin tension lines where possible.    The area thus outlined was incised deep to adipose tissue with a #15 scalpel blade.  The skin margins were undermined to an appropriate distance in all directions utilizing iris scissors.
Complex Repair And Double M Plasty Text: The defect edges were debeveled with a #15 scalpel blade.  The primary defect was closed partially with a complex linear closure.  Given the location of the remaining defect, shape of the defect and the proximity to free margins a double M plasty was deemed most appropriate for complete closure of the defect.  Using a sterile surgical marker, an appropriate advancement flap was drawn incorporating the defect and placing the expected incisions within the relaxed skin tension lines where possible.    The area thus outlined was incised deep to adipose tissue with a #15 scalpel blade.  The skin margins were undermined to an appropriate distance in all directions utilizing iris scissors.
Complex Repair And W Plasty Text: The defect edges were debeveled with a #15 scalpel blade.  The primary defect was closed partially with a complex linear closure.  Given the location of the remaining defect, shape of the defect and the proximity to free margins a W plasty was deemed most appropriate for complete closure of the defect.  Using a sterile surgical marker, an appropriate advancement flap was drawn incorporating the defect and placing the expected incisions within the relaxed skin tension lines where possible.    The area thus outlined was incised deep to adipose tissue with a #15 scalpel blade.  The skin margins were undermined to an appropriate distance in all directions utilizing iris scissors.
Complex Repair And Z Plasty Text: The defect edges were debeveled with a #15 scalpel blade.  The primary defect was closed partially with a complex linear closure.  Given the location of the remaining defect, shape of the defect and the proximity to free margins a Z plasty was deemed most appropriate for complete closure of the defect.  Using a sterile surgical marker, an appropriate advancement flap was drawn incorporating the defect and placing the expected incisions within the relaxed skin tension lines where possible.    The area thus outlined was incised deep to adipose tissue with a #15 scalpel blade.  The skin margins were undermined to an appropriate distance in all directions utilizing iris scissors.
Complex Repair And Dorsal Nasal Flap Text: The defect edges were debeveled with a #15 scalpel blade.  The primary defect was closed partially with a complex linear closure.  Given the location of the remaining defect, shape of the defect and the proximity to free margins a dorsal nasal flap was deemed most appropriate for complete closure of the defect.  Using a sterile surgical marker, an appropriate flap was drawn incorporating the defect and placing the expected incisions within the relaxed skin tension lines where possible.    The area thus outlined was incised deep to adipose tissue with a #15 scalpel blade.  The skin margins were undermined to an appropriate distance in all directions utilizing iris scissors.
Complex Repair And Ftsg Text: The defect edges were debeveled with a #15 scalpel blade.  The primary defect was closed partially with a complex linear closure.  Given the location of the defect, shape of the defect and the proximity to free margins a full thickness skin graft was deemed most appropriate to repair the remaining defect.  The graft was trimmed to fit the size of the remaining defect.  The graft was then placed in the primary defect, oriented appropriately, and sutured into place.
Complex Repair And Burow's Graft Text: The defect edges were debeveled with a #15 scalpel blade.  The primary defect was closed partially with a complex linear closure.  Given the location of the defect, shape of the defect, the proximity to free margins and the presence of a standing cone deformity a Burow's graft was deemed most appropriate to repair the remaining defect.  The graft was trimmed to fit the size of the remaining defect.  The graft was then placed in the primary defect, oriented appropriately, and sutured into place.
Complex Repair And Split-Thickness Skin Graft Text: The defect edges were debeveled with a #15 scalpel blade.  The primary defect was closed partially with a complex linear closure.  Given the location of the defect, shape of the defect and the proximity to free margins a split thickness skin graft was deemed most appropriate to repair the remaining defect.  The graft was trimmed to fit the size of the remaining defect.  The graft was then placed in the primary defect, oriented appropriately, and sutured into place.
Complex Repair And Epidermal Autograft Text: The defect edges were debeveled with a #15 scalpel blade.  The primary defect was closed partially with a complex linear closure.  Given the location of the defect, shape of the defect and the proximity to free margins an epidermal autograft was deemed most appropriate to repair the remaining defect.  The graft was trimmed to fit the size of the remaining defect.  The graft was then placed in the primary defect, oriented appropriately, and sutured into place.
Complex Repair And Dermal Autograft Text: The defect edges were debeveled with a #15 scalpel blade.  The primary defect was closed partially with a complex linear closure.  Given the location of the defect, shape of the defect and the proximity to free margins an dermal autograft was deemed most appropriate to repair the remaining defect.  The graft was trimmed to fit the size of the remaining defect.  The graft was then placed in the primary defect, oriented appropriately, and sutured into place.
Complex Repair And Tissue Cultured Epidermal Autograft Text: The defect edges were debeveled with a #15 scalpel blade.  The primary defect was closed partially with a complex linear closure.  Given the location of the defect, shape of the defect and the proximity to free margins an tissue cultured epidermal autograft was deemed most appropriate to repair the remaining defect.  The graft was trimmed to fit the size of the remaining defect.  The graft was then placed in the primary defect, oriented appropriately, and sutured into place.
Complex Repair And Xenograft Text: The defect edges were debeveled with a #15 scalpel blade.  The primary defect was closed partially with a complex linear closure.  Given the location of the defect, shape of the defect and the proximity to free margins a xenograft was deemed most appropriate to repair the remaining defect.  The graft was trimmed to fit the size of the remaining defect.  The graft was then placed in the primary defect, oriented appropriately, and sutured into place.
Complex Repair And Skin Substitute Graft Text: The defect edges were debeveled with a #15 scalpel blade.  The primary defect was closed partially with a complex linear closure.  Given the location of the remaining defect, shape of the defect and the proximity to free margins a skin substitute graft was deemed most appropriate to repair the remaining defect.  The graft was trimmed to fit the size of the remaining defect.  The graft was then placed in the primary defect, oriented appropriately, and sutured into place.
Path Notes (To The Dermatopathologist): Please check margins.
Consent was obtained from the patient. The risks and benefits to therapy were discussed in detail. Specifically, the risks of infection, scarring, bleeding, prolonged wound healing, incomplete removal, allergy to anesthesia, nerve injury and recurrence were addressed. Prior to the procedure, the treatment site was clearly identified and confirmed by the patient. All components of Universal Protocol/PAUSE Rule completed.
Post-Care Instructions: I reviewed with the patient in detail post-care instructions:\\n1. Apply bacitracin over the steri-strips.  \\n2. Cut non-stick pad (Telfa) to cover the steri-strips\\n3. Apply tape (hypafix) over the non-stick pad\\n4. Change once per day for 5 days\\n5. Shower with bandage on, change bandage after shower\\n\\nPatient is not to engage in any heavy lifting, exercise, hot tub, or swimming for the next 14 days. Should the patient develop any fevers, chills, bleeding, severe pain patient will contact the office immediately.
Home Suture Removal Text: Patient was provided a home suture removal kit and will remove their sutures at home.  If they have any questions or difficulties they will call the office.
Where Do You Want The Question To Include Opioid Counseling Located?: Case Summary Tab
Information: Selecting Yes will display possible errors in your note based on the variables you have selected. This validation is only offered as a suggestion for you. PLEASE NOTE THAT THE VALIDATION TEXT WILL BE REMOVED WHEN YOU FINALIZE YOUR NOTE. IF YOU WANT TO FAX A PRELIMINARY NOTE YOU WILL NEED TO TOGGLE THIS TO 'NO' IF YOU DO NOT WANT IT IN YOUR FAXED NOTE.

## 2023-11-10 NOTE — PROCEDURE: MIPS QUALITY
Detail Level: Detailed
Quality 137: Melanoma: Continuity Of Care - Recall System: Patient information entered into a recall system that includes: target date for the next exam specified AND a process to follow up with patients regarding missed or unscheduled appointments
Quality 397: Melanoma: Reporting: Pathology report includes the pT Category, thickness, ulceration and mitotic rate, peripheral and deep margin status and presence or absence of microsatellitosis for invasive tumors.
Quality 226: Preventive Care And Screening: Tobacco Use: Screening And Cessation Intervention: Patient screened for tobacco use and is an ex/non-smoker
Quality 138: Melanoma: Coordination Of Care: A treatment plan was communicated to the physicians providing continuing care within one month of diagnosis outlining: diagnosis, tumor thickness and a plan for surgery or alternate care.
Quality 130: Documentation Of Current Medications In The Medical Record: Current Medications Documented

## 2023-12-18 ENCOUNTER — APPOINTMENT (RX ONLY)
Dept: URBAN - METROPOLITAN AREA CLINIC 4 | Facility: CLINIC | Age: 78
Setting detail: DERMATOLOGY
End: 2023-12-18

## 2023-12-18 DIAGNOSIS — L57.0 ACTINIC KERATOSIS: ICD-10-CM

## 2023-12-18 DIAGNOSIS — D22 MELANOCYTIC NEVI: ICD-10-CM

## 2023-12-18 DIAGNOSIS — L82.1 OTHER SEBORRHEIC KERATOSIS: ICD-10-CM

## 2023-12-18 DIAGNOSIS — Z85.828 PERSONAL HISTORY OF OTHER MALIGNANT NEOPLASM OF SKIN: ICD-10-CM

## 2023-12-18 DIAGNOSIS — L81.4 OTHER MELANIN HYPERPIGMENTATION: ICD-10-CM

## 2023-12-18 DIAGNOSIS — L57.8 OTHER SKIN CHANGES DUE TO CHRONIC EXPOSURE TO NONIONIZING RADIATION: ICD-10-CM

## 2023-12-18 DIAGNOSIS — D18.0 HEMANGIOMA: ICD-10-CM

## 2023-12-18 DIAGNOSIS — Z85.820 PERSONAL HISTORY OF MALIGNANT MELANOMA OF SKIN: ICD-10-CM

## 2023-12-18 PROBLEM — D48.5 NEOPLASM OF UNCERTAIN BEHAVIOR OF SKIN: Status: ACTIVE | Noted: 2023-12-18

## 2023-12-18 PROBLEM — D22.5 MELANOCYTIC NEVI OF TRUNK: Status: ACTIVE | Noted: 2023-12-18

## 2023-12-18 PROBLEM — D18.01 HEMANGIOMA OF SKIN AND SUBCUTANEOUS TISSUE: Status: ACTIVE | Noted: 2023-12-18

## 2023-12-18 PROCEDURE — 99214 OFFICE O/P EST MOD 30 MIN: CPT | Mod: 25

## 2023-12-18 PROCEDURE — 17000 DESTRUCT PREMALG LESION: CPT | Mod: 59

## 2023-12-18 PROCEDURE — 11103 TANGNTL BX SKIN EA SEP/ADDL: CPT

## 2023-12-18 PROCEDURE — ? LIQUID NITROGEN

## 2023-12-18 PROCEDURE — 11102 TANGNTL BX SKIN SINGLE LES: CPT

## 2023-12-18 PROCEDURE — 17003 DESTRUCT PREMALG LES 2-14: CPT

## 2023-12-18 PROCEDURE — ? BIOPSY BY SHAVE METHOD

## 2023-12-18 PROCEDURE — ? COUNSELING

## 2023-12-18 ASSESSMENT — LOCATION DETAILED DESCRIPTION DERM
LOCATION DETAILED: LEFT MID BACK
LOCATION DETAILED: LEFT CHEEK
LOCATION DETAILED: RIGHT DORSAL FOREARM
LOCATION DETAILED: LEFT INFERIOR POSTAURICULAR SKIN
LOCATION DETAILED: RIGHT FOREHEAD
LOCATION DETAILED: LEFT ANTERIOR THIGH
LOCATION DETAILED: LEFT DORSAL HAND
LOCATION DETAILED: LEFT INFERIOR CENTRAL MALAR CHEEK
LOCATION DETAILED: LEFT DORSAL FOREARM
LOCATION DETAILED: LEFT UPPER BACK
LOCATION DETAILED: LEFT SUPERIOR LATERAL MALAR CHEEK
LOCATION DETAILED: RIGHT ANTERIOR THIGH
LOCATION DETAILED: RIGHT CHEEK
LOCATION DETAILED: RIGHT MEDIAL MALAR CHEEK
LOCATION DETAILED: RIGHT UPPER BACK
LOCATION DETAILED: RIGHT MID TEMPLE
LOCATION DETAILED: RIGHT INFERIOR LATERAL MALAR CHEEK
LOCATION DETAILED: RIGHT DORSAL HAND

## 2023-12-18 ASSESSMENT — LOCATION SIMPLE DESCRIPTION DERM
LOCATION SIMPLE: LEFT LOWER EXTREMITY
LOCATION SIMPLE: RIGHT UPPER EXTREMITY
LOCATION SIMPLE: RIGHT CHEEK
LOCATION SIMPLE: LEFT HAND
LOCATION SIMPLE: LEFT CHEEK
LOCATION SIMPLE: BACK
LOCATION SIMPLE: RIGHT FOREHEAD
LOCATION SIMPLE: LEFT UPPER EXTREMITY
LOCATION SIMPLE: SCALP
LOCATION SIMPLE: RIGHT TEMPLE
LOCATION SIMPLE: RIGHT LOWER EXTREMITY
LOCATION SIMPLE: RIGHT HAND

## 2023-12-18 ASSESSMENT — LOCATION ZONE DERM
LOCATION ZONE: TRUNK
LOCATION ZONE: HAND
LOCATION ZONE: ARM
LOCATION ZONE: FACE
LOCATION ZONE: LEG
LOCATION ZONE: SCALP

## 2023-12-18 NOTE — HPI: EVALUATION OF SKIN LESION(S)
What Type Of Note Output Would You Prefer (Optional)?: Standard Output
Hpi Title: Evaluation of Skin Lesions
Location: Right lateral neck
Year Removed: 9/2023

## 2023-12-18 NOTE — PROCEDURE: LIQUID NITROGEN
Aperture Size (Optional): C
Consent: The patient's consent was obtained including but not limited to risks of crusting, scabbing, blistering, scarring, darker or lighter pigmentary change, recurrence, incomplete removal and infection.
Show Aperture Variable?: Yes
Render Post-Care Instructions In Note?: no
Post-Care Instructions: I reviewed with the patient in detail post-care instructions. Patient is to wear sunprotection, and avoid picking at any of the treated lesions. Pt may apply Vaseline to crusted or scabbing areas.
Duration Of Freeze Thaw-Cycle (Seconds): 3
Detail Level: Simple
Number Of Freeze-Thaw Cycles: 2 freeze-thaw cycles

## 2023-12-18 NOTE — PROCEDURE: MIPS QUALITY
Quality 226: Preventive Care And Screening: Tobacco Use: Screening And Cessation Intervention: Patient screened for tobacco use and is an ex/non-smoker
Quality 397: Melanoma: Reporting: Pathology report includes the pT Category, thickness, ulceration and mitotic rate, peripheral and deep margin status and presence or absence of microsatellitosis for invasive tumors.
Quality 137: Melanoma: Continuity Of Care - Recall System: Patient information entered into a recall system that includes: target date for the next exam specified AND a process to follow up with patients regarding missed or unscheduled appointments
Quality 130: Documentation Of Current Medications In The Medical Record: Current Medications Documented
Quality 138: Melanoma: Coordination Of Care: A treatment plan was communicated to the physicians providing continuing care within one month of diagnosis outlining: diagnosis, tumor thickness and a plan for surgery or alternate care.
Detail Level: Detailed

## 2024-01-23 ENCOUNTER — HOSPITAL ENCOUNTER (OUTPATIENT)
Dept: RADIOLOGY | Facility: MEDICAL CENTER | Age: 79
End: 2024-01-23
Attending: OTOLARYNGOLOGY
Payer: COMMERCIAL

## 2024-01-23 DIAGNOSIS — R13.13 PHARYNGEAL DYSPHAGIA: ICD-10-CM

## 2024-01-23 DIAGNOSIS — Z92.3 PERSONAL HISTORY OF IRRADIATION, PRESENTING HAZARDS TO HEALTH: ICD-10-CM

## 2024-01-23 DIAGNOSIS — R13.12 OROPHARYNGEAL DYSPHAGIA: ICD-10-CM

## 2024-01-23 PROCEDURE — 74230 X-RAY XM SWLNG FUNCJ C+: CPT

## 2024-01-23 PROCEDURE — 700117 HCHG RX CONTRAST REV CODE 255: Performed by: OTOLARYNGOLOGY

## 2024-01-23 PROCEDURE — 92611 MOTION FLUOROSCOPY/SWALLOW: CPT

## 2024-01-23 RX ADMIN — BARIUM SULFATE 700 MG: 700 TABLET ORAL at 15:53

## 2024-01-24 NOTE — OP THERAPY EVALUATION
"[facility] Therapy Services  Outpatient Modified Barium Swallow Study           Patient Name: Marco Echeverria  Date of Evaluation: 1/23/2024  Referring Provider: Dr. Shelly Grady  Fax: [phone]    Patient History/Reason for Referral  The pt was referred for a Modified Barium Swallow Study (MBSS) due to VPI and dysphagia.    The pt complains of increased, thick phlegm. He also reports intermittent coughing and choking on foods and liquids, though endorses he has largely been able to maintain a regular diet. He has experienced intermittent nasal regurgitation during PO intake. He denies any recent weight loss or respiratory issues/pneumonias.     PMH: Oropharyngeal cancer s/p resection, chemo and radiation therapy, HTN, CVA w/ left hemiparesis- now resolved per pt report, right carotid endarterectomy 7/13/20, hearing loss, skin cancer.    Per EMR notes, \"Left partial pharyngectomy (tonsillectomy), partial soft palate resection, and partial resection base of the tongue, left, plus laryngoscopy direct\" on 8/23/2011    Oral Mechanism Evaluation  Facial Symmetry:  trace right facial weakness which pt reports is chronic from a prior procedure  Facial Sensation: Equal  Labial Observations: WFL  Lingual Observations: Midline, reduced lingual protrusion  Dentition: Natural dentition  Comment: Abnormality of the soft palate consistent w/ hx of soft palate resection    Voice  Quality: Mildly reduced vocal intensity, mildly hoarse  Resonance: Hypernasal, VPI  Intensity: Soft  Cough: Perceptually weak (mild)  Comments: Pt would benefit from formal cough testing    Current Method of Nutrition   Regular solids, Thin/all Liquids ; pt reports he is \"careful\" with certain advanced solids such as breads but he is \"still able to eat it\"    Pertinent Information  Affect/Behavior:  Pleasant and cooperative  Oxygen Requirements: Room Air  Secretion Management: WNL, however pt complains of thick phlegm    Subjective  Discussed " with the risks, benefits, and alternatives of the MBSS procedure. Patient/family acknowledged and agreed to proceed.    Assessment  Videofluoroscopic Swallow Study was conducted in the lateral and AP projection(s) to evaluate oropharyngeal swallow function. A radiology tech was present to assist with the procedure.     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; this did impede bolus flow  Bolus Administration: Patient  PO barium contrast trials: Varibar thin liquid, liquidized mixed with Varibar powder, Varibar pudding, solid coated in Varibar pudding, mixed consistency coated in Varibar powder, barium tablet    Consistency PAS Score Timing Comments   Thin Liquid 7 During swallow Max PAS 7 (one episode, see below), Modal PAS: 2   Liquidized 1 N/A Moderate vallecular residue, mild pyriform residue    Cleared w/ a double swallow and liquid sip   Pudding 1 N/A Moderate vallecular residue, moderate pyriform residue    Cleared w/ a double swallow and liquid sip   Soft & Bite Sized 1 N/A Moderate vallecular residue, mild pyriform residue    Reduced to mild with a double swallow and liquid sip   Solid 1 N/A Severe vallecular residue, moderate pyriform residue    Reduced to moderate with a double swallow and liquid sip   12 mm Barium Tablet taken with thin liquid 7 During swallow The barium tablet was retained above the cricopharyngeal bar. Pt took multiple sips of thin liquid in an effort to clear, resulting in PAS 7 of residual liquid from the pyriform sinuses.   The tablet was eventually passed after completion of 7 dry swallows     Penetration-Aspiration Scale (PAS)  1     No contrast enters airway  2     Contrast enters the airway, remains above the vocal folds, and is ejected from the airway (not seen in the airway at the end of the swallow).  3     Contrast enters the airway, remains above the vocal folds, and is  not ejected from the airway (is seen in the airway after the swallow).  4     Contrast enters the airway, contacts the vocal folds, and is ejected from the airway.  5     Contrast enters the airway, contacts the vocal folds, and is not ejected from the airway  6     Contrast enters the airway, crosses the plane of the vocal folds, and is ejected from the airway.  7     Contrast enters the airway, crosses the plane of the vocal folds, and is not ejected from the airway despite effort.  8     Contrast enters the airway, crosses the plane of the vocal folds, is not ejected from the airway and there is no response to aspiration.    Oral phase: Prolonged for solids, but functional AP transit and bolus formation. No oral residue post swallow. Pt denies xerotstomia    Pharyngeal phase: Swallow initiation was relatively WNL. There was reduced base of tongue retraction, posterior pharyngeal wall stripping wave, hyolaryngeal elevation & excursion. Epiglottic inversion was achieved though it was mildly reduced. Reduced UES relaxation was also noted. Reduced swallow efficiency resulted in pharyngeal residues that increased w/ viscosity. Pt appeared to be sensate to larger amounts of residue, but not mild amounts. There was one episode of aspiration (see above) which triggered an immediate cough response.    Esophageal phase: An esophageal sweep was performed; it was unremarkable    Compensatory Strategies: A double swallow and liquid wash was effective in clearing the majority of residue for purees, semi-solids and softer whole solids.     Clinical Impressions    The pt presents with mild-moderate pharyngeal dysphagia (NASIMA), likely chronic r/t post radiation and resection effects in the context of oropharyngeal cancer. There was also a finding of a large, partially obstructive cricopharyngeal bar.     Swallow safety is relatively preserved, swallow efficiency is impaired. He appears to be at low risk for aspiration PNA and  malnutrition/dehydration. Minor diet modification and compensatory swallow strategies appear to be indicated. The patient is recommended to follow up with outpatient Speech therapy services to initiate swallow exercises to best maintain current level of function as long as possible in the context of radiation fibrosis syndrome. The patient may also benefit from period re-evaluations of swallow safety and efficiency.      Recommendations  Thin/all Liquids, Softer Solids (e.g., Easy to Chew)  2.  Swallowing Instructions & Precautions:   Supervision: Independent  Positioning: Fully upright and midline during oral intake  Medication: smaller pills whole 1x/time, larger pills crushed or in liquid form  Strategies: Double Swallow and Liquid wash with each bite  Oral Care: BID  3. Follow up with outpatient Speech Therapy      Thank you for the referral. For further questions, please call [phone].  Ryann Reveles, SLP

## 2024-02-08 ENCOUNTER — APPOINTMENT (RX ONLY)
Dept: URBAN - METROPOLITAN AREA CLINIC 4 | Facility: CLINIC | Age: 79
Setting detail: DERMATOLOGY
End: 2024-02-08

## 2024-02-08 DIAGNOSIS — L30.9 DERMATITIS, UNSPECIFIED: ICD-10-CM

## 2024-02-08 DIAGNOSIS — L57.0 ACTINIC KERATOSIS: ICD-10-CM

## 2024-02-08 PROCEDURE — ? PRESCRIPTION

## 2024-02-08 PROCEDURE — ? DIAGNOSIS COMMENT

## 2024-02-08 PROCEDURE — 99213 OFFICE O/P EST LOW 20 MIN: CPT

## 2024-02-08 PROCEDURE — ? COUNSELING

## 2024-02-08 RX ORDER — CLOBETASOL PROPIONATE 0.5 MG/G
1 OINTMENT TOPICAL BID
Qty: 60 | Refills: 3 | Status: ERX | COMMUNITY
Start: 2024-02-08

## 2024-02-08 RX ADMIN — CLOBETASOL PROPIONATE 1: 0.5 OINTMENT TOPICAL at 00:00

## 2024-02-08 ASSESSMENT — SEVERITY ASSESSMENT: SEVERITY: MILD

## 2024-02-08 ASSESSMENT — ITCH NUMERIC RATING SCALE: HOW SEVERE IS YOUR ITCHING?: 3

## 2024-02-08 ASSESSMENT — BSA RASH: BSA RASH: 11

## 2024-02-08 NOTE — PROCEDURE: DIAGNOSIS COMMENT
Detail Level: Simple
Render Risk Assessment In Note?: no
Comment: Follow up biopsy [id number]\\nResolved

## 2024-03-04 ENCOUNTER — APPOINTMENT (RX ONLY)
Dept: URBAN - METROPOLITAN AREA CLINIC 4 | Facility: CLINIC | Age: 79
Setting detail: DERMATOLOGY
End: 2024-03-04

## 2024-03-04 DIAGNOSIS — L81.4 OTHER MELANIN HYPERPIGMENTATION: ICD-10-CM

## 2024-03-04 DIAGNOSIS — Z85.820 PERSONAL HISTORY OF MALIGNANT MELANOMA OF SKIN: ICD-10-CM

## 2024-03-04 DIAGNOSIS — L82.1 OTHER SEBORRHEIC KERATOSIS: ICD-10-CM

## 2024-03-04 DIAGNOSIS — D18.0 HEMANGIOMA: ICD-10-CM

## 2024-03-04 DIAGNOSIS — Z85.828 PERSONAL HISTORY OF OTHER MALIGNANT NEOPLASM OF SKIN: ICD-10-CM

## 2024-03-04 DIAGNOSIS — D22 MELANOCYTIC NEVI: ICD-10-CM

## 2024-03-04 DIAGNOSIS — L57.8 OTHER SKIN CHANGES DUE TO CHRONIC EXPOSURE TO NONIONIZING RADIATION: ICD-10-CM

## 2024-03-04 PROBLEM — D22.5 MELANOCYTIC NEVI OF TRUNK: Status: ACTIVE | Noted: 2024-03-04

## 2024-03-04 PROBLEM — D18.01 HEMANGIOMA OF SKIN AND SUBCUTANEOUS TISSUE: Status: ACTIVE | Noted: 2024-03-04

## 2024-03-04 PROCEDURE — 99213 OFFICE O/P EST LOW 20 MIN: CPT

## 2024-03-04 PROCEDURE — ? COUNSELING

## 2024-03-04 ASSESSMENT — LOCATION DETAILED DESCRIPTION DERM
LOCATION DETAILED: LEFT DORSAL FOREARM
LOCATION DETAILED: RIGHT UPPER BACK
LOCATION DETAILED: LEFT ANTERIOR THIGH
LOCATION DETAILED: RIGHT DORSAL HAND
LOCATION DETAILED: LEFT DORSAL HAND
LOCATION DETAILED: RIGHT CHEEK
LOCATION DETAILED: RIGHT ANTERIOR THIGH
LOCATION DETAILED: RIGHT DORSAL FOREARM
LOCATION DETAILED: LEFT CHEEK
LOCATION DETAILED: LEFT MID BACK
LOCATION DETAILED: LEFT UPPER BACK

## 2024-03-04 ASSESSMENT — LOCATION SIMPLE DESCRIPTION DERM
LOCATION SIMPLE: BACK
LOCATION SIMPLE: RIGHT LOWER EXTREMITY
LOCATION SIMPLE: RIGHT UPPER EXTREMITY
LOCATION SIMPLE: LEFT HAND
LOCATION SIMPLE: LEFT LOWER EXTREMITY
LOCATION SIMPLE: LEFT CHEEK
LOCATION SIMPLE: LEFT UPPER EXTREMITY
LOCATION SIMPLE: RIGHT CHEEK
LOCATION SIMPLE: RIGHT HAND

## 2024-03-04 ASSESSMENT — LOCATION ZONE DERM
LOCATION ZONE: TRUNK
LOCATION ZONE: ARM
LOCATION ZONE: HAND
LOCATION ZONE: FACE
LOCATION ZONE: LEG

## 2024-03-04 NOTE — PROCEDURE: MIPS QUALITY
Detail Level: Detailed
Quality 137: Melanoma: Continuity Of Care - Recall System: Patient information entered into a recall system that includes: target date for the next exam specified AND a process to follow up with patients regarding missed or unscheduled appointments
Quality 130: Documentation Of Current Medications In The Medical Record: Current Medications Documented
Quality 397: Melanoma: Reporting: Pathology report includes the pT Category, thickness, ulceration and mitotic rate, peripheral and deep margin status and presence or absence of microsatellitosis for invasive tumors.
Quality 226: Preventive Care And Screening: Tobacco Use: Screening And Cessation Intervention: Patient screened for tobacco use and is an ex/non-smoker

## 2024-03-04 NOTE — HPI: EVALUATION OF SKIN LESION(S)
What Type Of Note Output Would You Prefer (Optional)?: Standard Output
Hpi Title: Evaluation of Skin Lesions
Year Removed: 9/2023

## 2024-03-23 ENCOUNTER — APPOINTMENT (OUTPATIENT)
Dept: RADIOLOGY | Facility: MEDICAL CENTER | Age: 79
End: 2024-03-23
Attending: EMERGENCY MEDICINE
Payer: COMMERCIAL

## 2024-03-23 ENCOUNTER — HOSPITAL ENCOUNTER (INPATIENT)
Facility: MEDICAL CENTER | Age: 79
LOS: 2 days | End: 2024-03-25
Attending: EMERGENCY MEDICINE | Admitting: HOSPITALIST
Payer: COMMERCIAL

## 2024-03-23 DIAGNOSIS — I65.22 STENOSIS OF LEFT CAROTID ARTERY: ICD-10-CM

## 2024-03-23 DIAGNOSIS — G45.9 TIA (TRANSIENT ISCHEMIC ATTACK): ICD-10-CM

## 2024-03-23 DIAGNOSIS — I10 HYPERTENSION, UNSPECIFIED TYPE: ICD-10-CM

## 2024-03-23 DIAGNOSIS — I65.22 CAROTID STENOSIS, SYMPTOMATIC W/O INFARCT, LEFT: ICD-10-CM

## 2024-03-23 LAB
ABO GROUP BLD: ABNORMAL
ALBUMIN SERPL BCP-MCNC: 4.8 G/DL (ref 3.2–4.9)
ALBUMIN/GLOB SERPL: 2.2 G/DL
ALP SERPL-CCNC: 81 U/L (ref 30–99)
ALT SERPL-CCNC: 23 U/L (ref 2–50)
ANION GAP SERPL CALC-SCNC: 13 MMOL/L (ref 7–16)
APTT PPP: 28.1 SEC (ref 24.7–36)
AST SERPL-CCNC: 28 U/L (ref 12–45)
BASOPHILS # BLD AUTO: 0.7 % (ref 0–1.8)
BASOPHILS # BLD: 0.03 K/UL (ref 0–0.12)
BILIRUB SERPL-MCNC: 0.8 MG/DL (ref 0.1–1.5)
BLD GP AB SCN SERPL QL: ABNORMAL
BUN SERPL-MCNC: 11 MG/DL (ref 8–22)
CALCIUM ALBUM COR SERPL-MCNC: 8.9 MG/DL (ref 8.5–10.5)
CALCIUM SERPL-MCNC: 9.5 MG/DL (ref 8.5–10.5)
CHLORIDE SERPL-SCNC: 95 MMOL/L (ref 96–112)
CO2 SERPL-SCNC: 24 MMOL/L (ref 20–33)
CREAT SERPL-MCNC: 0.65 MG/DL (ref 0.5–1.4)
EKG IMPRESSION: NORMAL
EKG IMPRESSION: NORMAL
EOSINOPHIL # BLD AUTO: 0.08 K/UL (ref 0–0.51)
EOSINOPHIL NFR BLD: 1.8 % (ref 0–6.9)
ERYTHROCYTE [DISTWIDTH] IN BLOOD BY AUTOMATED COUNT: 42.4 FL (ref 35.9–50)
GFR SERPLBLD CREATININE-BSD FMLA CKD-EPI: 96 ML/MIN/1.73 M 2
GLOBULIN SER CALC-MCNC: 2.2 G/DL (ref 1.9–3.5)
GLUCOSE SERPL-MCNC: 140 MG/DL (ref 65–99)
HCT VFR BLD AUTO: 46.5 % (ref 42–52)
HGB BLD-MCNC: 16.4 G/DL (ref 14–18)
IMM GRANULOCYTES # BLD AUTO: 0.01 K/UL (ref 0–0.11)
IMM GRANULOCYTES NFR BLD AUTO: 0.2 % (ref 0–0.9)
INR PPP: 0.98 (ref 0.87–1.13)
LYMPHOCYTES # BLD AUTO: 0.65 K/UL (ref 1–4.8)
LYMPHOCYTES NFR BLD: 14.7 % (ref 22–41)
MCH RBC QN AUTO: 32.1 PG (ref 27–33)
MCHC RBC AUTO-ENTMCNC: 35.3 G/DL (ref 32.3–36.5)
MCV RBC AUTO: 91 FL (ref 81.4–97.8)
MONOCYTES # BLD AUTO: 0.57 K/UL (ref 0–0.85)
MONOCYTES NFR BLD AUTO: 12.9 % (ref 0–13.4)
NEUTROPHILS # BLD AUTO: 3.09 K/UL (ref 1.82–7.42)
NEUTROPHILS NFR BLD: 69.7 % (ref 44–72)
NRBC # BLD AUTO: 0 K/UL
NRBC BLD-RTO: 0 /100 WBC (ref 0–0.2)
PLATELET # BLD AUTO: 174 K/UL (ref 164–446)
PMV BLD AUTO: 9.2 FL (ref 9–12.9)
POTASSIUM SERPL-SCNC: 4.6 MMOL/L (ref 3.6–5.5)
PROT SERPL-MCNC: 7 G/DL (ref 6–8.2)
PROTHROMBIN TIME: 13.1 SEC (ref 12–14.6)
RBC # BLD AUTO: 5.11 M/UL (ref 4.7–6.1)
RH BLD: ABNORMAL
SODIUM SERPL-SCNC: 132 MMOL/L (ref 135–145)
TROPONIN T SERPL-MCNC: 13 NG/L (ref 6–19)
WBC # BLD AUTO: 4.4 K/UL (ref 4.8–10.8)

## 2024-03-23 PROCEDURE — 99285 EMERGENCY DEPT VISIT HI MDM: CPT

## 2024-03-23 PROCEDURE — 86900 BLOOD TYPING SEROLOGIC ABO: CPT

## 2024-03-23 PROCEDURE — 700105 HCHG RX REV CODE 258: Performed by: HOSPITALIST

## 2024-03-23 PROCEDURE — 85610 PROTHROMBIN TIME: CPT

## 2024-03-23 PROCEDURE — A9270 NON-COVERED ITEM OR SERVICE: HCPCS | Performed by: EMERGENCY MEDICINE

## 2024-03-23 PROCEDURE — 700117 HCHG RX CONTRAST REV CODE 255: Performed by: EMERGENCY MEDICINE

## 2024-03-23 PROCEDURE — 86850 RBC ANTIBODY SCREEN: CPT

## 2024-03-23 PROCEDURE — 93005 ELECTROCARDIOGRAM TRACING: CPT

## 2024-03-23 PROCEDURE — 99222 1ST HOSP IP/OBS MODERATE 55: CPT | Mod: 57 | Performed by: SURGERY

## 2024-03-23 PROCEDURE — 96374 THER/PROPH/DIAG INJ IV PUSH: CPT

## 2024-03-23 PROCEDURE — 70496 CT ANGIOGRAPHY HEAD: CPT

## 2024-03-23 PROCEDURE — A9270 NON-COVERED ITEM OR SERVICE: HCPCS | Performed by: HOSPITALIST

## 2024-03-23 PROCEDURE — 93005 ELECTROCARDIOGRAM TRACING: CPT | Performed by: EMERGENCY MEDICINE

## 2024-03-23 PROCEDURE — 36415 COLL VENOUS BLD VENIPUNCTURE: CPT

## 2024-03-23 PROCEDURE — 99222 1ST HOSP IP/OBS MODERATE 55: CPT | Performed by: NURSE PRACTITIONER

## 2024-03-23 PROCEDURE — 85730 THROMBOPLASTIN TIME PARTIAL: CPT

## 2024-03-23 PROCEDURE — 71045 X-RAY EXAM CHEST 1 VIEW: CPT

## 2024-03-23 PROCEDURE — 94760 N-INVAS EAR/PLS OXIMETRY 1: CPT

## 2024-03-23 PROCEDURE — 99223 1ST HOSP IP/OBS HIGH 75: CPT | Mod: AI | Performed by: HOSPITALIST

## 2024-03-23 PROCEDURE — 700102 HCHG RX REV CODE 250 W/ 637 OVERRIDE(OP): Performed by: HOSPITALIST

## 2024-03-23 PROCEDURE — 700102 HCHG RX REV CODE 250 W/ 637 OVERRIDE(OP): Performed by: EMERGENCY MEDICINE

## 2024-03-23 PROCEDURE — 85025 COMPLETE CBC W/AUTO DIFF WBC: CPT

## 2024-03-23 PROCEDURE — 86901 BLOOD TYPING SEROLOGIC RH(D): CPT

## 2024-03-23 PROCEDURE — 770001 HCHG ROOM/CARE - MED/SURG/GYN PRIV*

## 2024-03-23 PROCEDURE — 700111 HCHG RX REV CODE 636 W/ 250 OVERRIDE (IP): Mod: JG | Performed by: HOSPITALIST

## 2024-03-23 PROCEDURE — 84484 ASSAY OF TROPONIN QUANT: CPT

## 2024-03-23 PROCEDURE — 96376 TX/PRO/DX INJ SAME DRUG ADON: CPT

## 2024-03-23 PROCEDURE — 70498 CT ANGIOGRAPHY NECK: CPT

## 2024-03-23 PROCEDURE — 0042T CT-CEREBRAL PERFUSION ANALYSIS: CPT

## 2024-03-23 PROCEDURE — 700111 HCHG RX REV CODE 636 W/ 250 OVERRIDE (IP): Mod: JG | Performed by: EMERGENCY MEDICINE

## 2024-03-23 PROCEDURE — 80053 COMPREHEN METABOLIC PANEL: CPT

## 2024-03-23 RX ORDER — ATORVASTATIN CALCIUM 40 MG/1
80 TABLET, FILM COATED ORAL DAILY
Status: DISCONTINUED | OUTPATIENT
Start: 2024-03-24 | End: 2024-03-25 | Stop reason: HOSPADM

## 2024-03-23 RX ORDER — CLOPIDOGREL BISULFATE 75 MG/1
75 TABLET ORAL ONCE
Status: COMPLETED | OUTPATIENT
Start: 2024-03-23 | End: 2024-03-23

## 2024-03-23 RX ORDER — ATORVASTATIN CALCIUM 80 MG/1
80 TABLET, FILM COATED ORAL DAILY
Status: ON HOLD | COMMUNITY
End: 2024-03-25

## 2024-03-23 RX ORDER — METOPROLOL TARTRATE 50 MG/1
50 TABLET, FILM COATED ORAL 2 TIMES DAILY
Status: DISCONTINUED | OUTPATIENT
Start: 2024-03-23 | End: 2024-03-25 | Stop reason: HOSPADM

## 2024-03-23 RX ORDER — LABETALOL HYDROCHLORIDE 5 MG/ML
10 INJECTION, SOLUTION INTRAVENOUS EVERY 4 HOURS PRN
Status: DISCONTINUED | OUTPATIENT
Start: 2024-03-23 | End: 2024-03-24

## 2024-03-23 RX ORDER — LEVOTHYROXINE SODIUM 0.05 MG/1
50 TABLET ORAL
Status: DISCONTINUED | OUTPATIENT
Start: 2024-03-24 | End: 2024-03-25 | Stop reason: HOSPADM

## 2024-03-23 RX ORDER — NEBIVOLOL 10 MG/1
10 TABLET ORAL EVERY EVENING
COMMUNITY

## 2024-03-23 RX ORDER — ACETAMINOPHEN 325 MG/1
650 TABLET ORAL EVERY 6 HOURS PRN
Status: DISCONTINUED | OUTPATIENT
Start: 2024-03-23 | End: 2024-03-25 | Stop reason: HOSPADM

## 2024-03-23 RX ORDER — LEVOTHYROXINE SODIUM 0.05 MG/1
50 TABLET ORAL
COMMUNITY

## 2024-03-23 RX ORDER — ONDANSETRON 4 MG/1
4 TABLET, ORALLY DISINTEGRATING ORAL EVERY 4 HOURS PRN
Status: DISCONTINUED | OUTPATIENT
Start: 2024-03-23 | End: 2024-03-25 | Stop reason: HOSPADM

## 2024-03-23 RX ORDER — NEBIVOLOL 10 MG/1
10 TABLET ORAL EVERY EVENING
Status: DISCONTINUED | OUTPATIENT
Start: 2024-03-23 | End: 2024-03-23

## 2024-03-23 RX ORDER — LABETALOL HYDROCHLORIDE 5 MG/ML
10 INJECTION, SOLUTION INTRAVENOUS ONCE
Status: COMPLETED | OUTPATIENT
Start: 2024-03-23 | End: 2024-03-23

## 2024-03-23 RX ORDER — ONDANSETRON 2 MG/ML
4 INJECTION INTRAMUSCULAR; INTRAVENOUS EVERY 4 HOURS PRN
Status: DISCONTINUED | OUTPATIENT
Start: 2024-03-23 | End: 2024-03-24

## 2024-03-23 RX ORDER — LABETALOL HYDROCHLORIDE 5 MG/ML
20 INJECTION, SOLUTION INTRAVENOUS ONCE
Status: COMPLETED | OUTPATIENT
Start: 2024-03-23 | End: 2024-03-23

## 2024-03-23 RX ORDER — SODIUM CHLORIDE, SODIUM LACTATE, POTASSIUM CHLORIDE, CALCIUM CHLORIDE 600; 310; 30; 20 MG/100ML; MG/100ML; MG/100ML; MG/100ML
INJECTION, SOLUTION INTRAVENOUS CONTINUOUS
Status: DISCONTINUED | OUTPATIENT
Start: 2024-03-23 | End: 2024-03-24

## 2024-03-23 RX ADMIN — LABETALOL HYDROCHLORIDE 10 MG: 5 INJECTION, SOLUTION INTRAVENOUS at 13:53

## 2024-03-23 RX ADMIN — LABETALOL HYDROCHLORIDE 10 MG: 5 INJECTION, SOLUTION INTRAVENOUS at 20:30

## 2024-03-23 RX ADMIN — METOPROLOL TARTRATE 50 MG: 50 TABLET, FILM COATED ORAL at 18:09

## 2024-03-23 RX ADMIN — LABETALOL HYDROCHLORIDE 20 MG: 5 INJECTION, SOLUTION INTRAVENOUS at 15:20

## 2024-03-23 RX ADMIN — CLOPIDOGREL BISULFATE 75 MG: 75 TABLET ORAL at 15:57

## 2024-03-23 RX ADMIN — IOHEXOL 40 ML: 350 INJECTION, SOLUTION INTRAVENOUS at 14:15

## 2024-03-23 RX ADMIN — SODIUM CHLORIDE, POTASSIUM CHLORIDE, SODIUM LACTATE AND CALCIUM CHLORIDE: 600; 310; 30; 20 INJECTION, SOLUTION INTRAVENOUS at 18:08

## 2024-03-23 RX ADMIN — IOHEXOL 80 ML: 350 INJECTION, SOLUTION INTRAVENOUS at 14:15

## 2024-03-23 ASSESSMENT — ENCOUNTER SYMPTOMS
DEPRESSION: 0
FOCAL WEAKNESS: 0
NERVOUS/ANXIOUS: 0
SHORTNESS OF BREATH: 0
DOUBLE VISION: 0
SPEECH CHANGE: 1
NAUSEA: 0
WEIGHT LOSS: 0
MYALGIAS: 0
SORE THROAT: 0
SENSORY CHANGE: 0
WEAKNESS: 0
ABDOMINAL PAIN: 0
DIZZINESS: 0
NECK PAIN: 0
PALPITATIONS: 0
BLURRED VISION: 0
STRIDOR: 0
HEADACHES: 0

## 2024-03-23 ASSESSMENT — COGNITIVE AND FUNCTIONAL STATUS - GENERAL
MOBILITY SCORE: 24
SUGGESTED CMS G CODE MODIFIER DAILY ACTIVITY: CH
DAILY ACTIVITIY SCORE: 24
SUGGESTED CMS G CODE MODIFIER MOBILITY: CH

## 2024-03-23 ASSESSMENT — PAIN DESCRIPTION - PAIN TYPE
TYPE: ACUTE PAIN
TYPE: ACUTE PAIN

## 2024-03-23 ASSESSMENT — LIFESTYLE VARIABLES
ALCOHOL_USE: YES
HOW MANY TIMES IN THE PAST YEAR HAVE YOU HAD 5 OR MORE DRINKS IN A DAY: 0
TOTAL SCORE: 0
DOES PATIENT WANT TO STOP DRINKING: NO
EVER HAD A DRINK FIRST THING IN THE MORNING TO STEADY YOUR NERVES TO GET RID OF A HANGOVER: NO
AVERAGE NUMBER OF DAYS PER WEEK YOU HAVE A DRINK CONTAINING ALCOHOL: 5
TOTAL SCORE: 0
DO YOU DRINK ALCOHOL: YES
SUBSTANCE_ABUSE: 0
EVER FELT BAD OR GUILTY ABOUT YOUR DRINKING: NO
TOTAL SCORE: 0
HAVE PEOPLE ANNOYED YOU BY CRITICIZING YOUR DRINKING: NO
ON A TYPICAL DAY WHEN YOU DRINK ALCOHOL HOW MANY DRINKS DO YOU HAVE: 2
HAVE YOU EVER FELT YOU SHOULD CUT DOWN ON YOUR DRINKING: NO
CONSUMPTION TOTAL: NEGATIVE

## 2024-03-23 NOTE — ED PROVIDER NOTES
"ED Provider Note    CHIEF COMPLAINT  Chief Complaint   Patient presents with    Hypertension     217/120    Possible Stroke     Last known well 1200 today       EXTERNAL RECORDS REVIEWED  Inpatient Notes discharge summary from July 2020, syncopal event with facial droop found to be right carotid critical stenosis, treated with endarterectomy    HPI/ROS  LIMITATION TO HISTORY   Select: : None  OUTSIDE HISTORIAN(S):  Significant other patient's wife at bedside for discussion history and symptoms    Marco Echeverria is a 79 y.o. male who presents with complaint of speech difficulty.  His wife states he was suddenly speaking and garbled speech \"a different language\", did not remember her name calling her by her daughter's name instead of her own.  He denies trauma or fall.  No associated headache.  He presents hypertensive, 217/120.  He denies fever or chills.  No vision loss.  No numbness weakness to extremities.  Onset of symptoms an hour and 20 minutes ago.  His wife states \"at least now I can understand him\", noting improved symptoms during this time.  Patient has history of prior stroke 4 years ago.  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  He himself states he feels nothing is wrong.    PAST MEDICAL HISTORY   has a past medical history of CATARACT, History of malignant neoplasm of tonsil (2011), History of tongue cancer (2011), Late effect of radiation, and Radiation caries.    SURGICAL HISTORY   has a past surgical history that includes other (1990s); cataract extraction with iol; septoplasty (7/15/2010); antrostomy (7/15/2010); ethmoidectomy (7/15/2010); tonsillectomy; laryngoscopy (8/23/2011); tumor excision with biopsy (8/23/2011); vasectomy; and thromboendartectmy neck,neck incis (Right, 7/13/2020).    FAMILY HISTORY  Family History   Problem Relation Age of Onset    Hypertension Mother        SOCIAL HISTORY  Social History     Tobacco Use " "   Smoking status: Former    Smokeless tobacco: Former     Quit date: 5/1/1975    Tobacco comments:     1-2 pk a day for 15 yrs   Substance and Sexual Activity    Alcohol use: Yes     Comment: 1 a month    Drug use: No    Sexual activity: Not on file       CURRENT MEDICATIONS  Home Medications       Reviewed by Rosey Bee R.N. (Registered Nurse) on 03/23/24 at 1327  Med List Status: <None>     Medication Last Dose Status   Multiple Vitamins-Minerals (PRESERVISION AREDS 2+MULTI VIT PO)  Active                    ALLERGIES  No Known Allergies    PHYSICAL EXAM  VITAL SIGNS: BP (!) 217/120   Pulse 66   Temp 36.3 °C (97.4 °F) (Temporal)   Resp 16   Ht 1.88 m (6' 2\")   Wt 74.9 kg (165 lb 2 oz)   SpO2 95%   BMI 21.20 kg/m²    Constitutional: No acute distress  Neurologic: Slight dysarthria.  No facial droop.  Strength and sensation in extremities is normal.  No drift.  Finger-nose test intact.  Visual fields and extraocular movements are normal.  Pupils are equal.  NIH stroke scale score of 1.  Patient was able to name objects, was able to tell me what he did for living prior to retiring.  Cardiac: Regular rate and rhythm  Respiratory: Clear lung sounds  GI: No distention  Psychiatric: Cooperative    DIAGNOSTIC STUDIES / PROCEDURES  EKG  I have independently interpreted this EKG  See below    LABS  Results for orders placed or performed during the hospital encounter of 03/23/24   CBC WITH DIFFERENTIAL   Result Value Ref Range    WBC 4.4 (L) 4.8 - 10.8 K/uL    RBC 5.11 4.70 - 6.10 M/uL    Hemoglobin 16.4 14.0 - 18.0 g/dL    Hematocrit 46.5 42.0 - 52.0 %    MCV 91.0 81.4 - 97.8 fL    MCH 32.1 27.0 - 33.0 pg    MCHC 35.3 32.3 - 36.5 g/dL    RDW 42.4 35.9 - 50.0 fL    Platelet Count 174 164 - 446 K/uL    MPV 9.2 9.0 - 12.9 fL    Neutrophils-Polys 69.70 44.00 - 72.00 %    Lymphocytes 14.70 (L) 22.00 - 41.00 %    Monocytes 12.90 0.00 - 13.40 %    Eosinophils 1.80 0.00 - 6.90 %    Basophils 0.70 0.00 - 1.80 %    Immature " Granulocytes 0.20 0.00 - 0.90 %    Nucleated RBC 0.00 0.00 - 0.20 /100 WBC    Neutrophils (Absolute) 3.09 1.82 - 7.42 K/uL    Lymphs (Absolute) 0.65 (L) 1.00 - 4.80 K/uL    Monos (Absolute) 0.57 0.00 - 0.85 K/uL    Eos (Absolute) 0.08 0.00 - 0.51 K/uL    Baso (Absolute) 0.03 0.00 - 0.12 K/uL    Immature Granulocytes (abs) 0.01 0.00 - 0.11 K/uL    NRBC (Absolute) 0.00 K/uL   COMP METABOLIC PANEL   Result Value Ref Range    Sodium 132 (L) 135 - 145 mmol/L    Potassium 4.6 3.6 - 5.5 mmol/L    Chloride 95 (L) 96 - 112 mmol/L    Co2 24 20 - 33 mmol/L    Anion Gap 13.0 7.0 - 16.0    Glucose 140 (H) 65 - 99 mg/dL    Bun 11 8 - 22 mg/dL    Creatinine 0.65 0.50 - 1.40 mg/dL    Calcium 9.5 8.5 - 10.5 mg/dL    Correct Calcium 8.9 8.5 - 10.5 mg/dL    AST(SGOT) 28 12 - 45 U/L    ALT(SGPT) 23 2 - 50 U/L    Alkaline Phosphatase 81 30 - 99 U/L    Total Bilirubin 0.8 0.1 - 1.5 mg/dL    Albumin 4.8 3.2 - 4.9 g/dL    Total Protein 7.0 6.0 - 8.2 g/dL    Globulin 2.2 1.9 - 3.5 g/dL    A-G Ratio 2.2 g/dL   PROTHROMBIN TIME   Result Value Ref Range    PT 13.1 12.0 - 14.6 sec    INR 0.98 0.87 - 1.13   APTT   Result Value Ref Range    APTT 28.1 24.7 - 36.0 sec   COD (ADULT)   Result Value Ref Range    ABO Grouping Only UNDET (A)     Rh Grouping Only POS     Antibody Screen-Cod NEG    TROPONIN   Result Value Ref Range    Troponin T 13 6 - 19 ng/L   ESTIMATED GFR   Result Value Ref Range    GFR (CKD-EPI) 96 >60 mL/min/1.73 m 2   EKG   Result Value Ref Range    Report       [facility] Emergency Dept.    Test Date:  2024  Pt Name:    CLAU RIDDLE             Department: ER  MRN:        [id number]                      Room:  Gender:     Male                         Technician: [id number]  :        1945                   Requested By:ER TRIAGE PROTOCOL  Order #:    [id number]                    Reading MD:    Measurements  Intervals                                Axis  Rate:       72                           P:           49  VT:         176                          QRS:        11  QRSD:       92                           T:          31  QT:         389  QTc:        426    Interpretive Statements  Sinus rhythm  Left atrial enlargement  Compared to ECG 2020 05:56:02  Atrial abnormality now present     EKG (NOW)   Result Value Ref Range    Report       [facility]    Test Date:  2024  Pt Name:    CLAU RIDDLE             Department: ER  MRN:        [id number]                      Room:       T407  Gender:     Male                         Technician: [id number]  :        1945                   Requested By:HELEN JOSEPH  Order #:    [id number]                    Reading MD:    Measurements  Intervals                                Axis  Rate:       72                           P:          49  VT:         176                          QRS:        11  QRSD:       92                           T:          31  QT:         389  QTc:        426    Interpretive Statements  Sinus rhythm  Left atrial enlargement  Compared to ECG 2020 05:56:02  Atrial abnormality now present           RADIOLOGY  I have independently interpreted the diagnostic imaging associated with this visit and am waiting the final reading from the radiologist.   My preliminary interpretation is as follows: Chest x-ray is negative for pneumonia  Radiologist interpretation:   DX-CHEST-PORTABLE (1 VIEW)   Final Result         1. No acute cardiopulmonary abnormalities are identified.      CT-CTA NECK WITH & W/O-POST PROCESSING   Final Result      1. Moderate stenosis at the origin of the left internal carotid artery, worse than 2020.      CT-CTA HEAD WITH & W/O-POST PROCESS   Final Result         1. No hemodynamically significant narrowing of the major intracranial vessels.      CT-CEREBRAL PERFUSION ANALYSIS   Final Result      1.  Cerebral blood flow less than 30% likely representing completed infarct = 0 mL.       2.  T Max more than 6 seconds likely representing combination of completed infarct and ischemia = 0 mL.      3.  Mismatched volume likely representing ischemic brain/penumbra = None      4.  Please note that the cerebral perfusion was performed on the limited brain tissue around the basal ganglia region. Infarct/ischemia outside the CT perfusion sections can be missed in this study.            COURSE & MEDICAL DECISION MAKING    ED Observation Status? No; Patient does not meet criteria for ED Observation.     INITIAL ASSESSMENT, COURSE AND PLAN  Care Narrative: Patient presents with strokelike symptoms including expressive aphasia, cannot remember his wife's name, garbled speech.  Differential diagnosis included intracranial hemorrhage, ischemic stroke, intracranial mass, metabolic abnormalities.his speech gradually improved over the course of an hour and 20 minutes, in the emergency department is now completely resolved.  Workup shows worsening stenosis of the left carotid artery, estimated at 50 to 70%.  Patient had stopped taking aspirin approximately a year ago as he did not like the bruising it caused him.  Case discussed with on-call neurology, Dr. Hannon who has recommend admission to hospital for further workup.  I discussed case with the patient's vascular surgeon Dr. Cai who has recommended starting Plavix, patient is under consideration for carotid endarterectomy tomorrow.  Patient is hospitalized for ongoing evaluation and treatment  HTN/IDDM FOLLOW UP:  The patient has known hypertension and is being followed by their primary care doctor      ADDITIONAL PROBLEM LIST  Hypertension: Treated with IV labetalol twice.  Improvement of blood pressure to 155/78    DISPOSITION AND DISCUSSIONS  I have discussed management of the patient with the following physicians and CHENCHO's: Vascular surgery on-call, neurology on-call, admitting hospitalist service      Decision tools and prescription drugs considered  including, but not limited to: NIH Stroke Scale 1 .    FINAL DIAGNOSIS  1. Hypertension, unspecified type    2. Stenosis of left carotid artery    3. TIA (transient ischemic attack)      CRITICAL CARE  The very real possibilty of a deterioration of this patient's condition, TIA, hypertensive urgency, required the highest level of my preparedness for sudden, emergent intervention.  I provided critical care services, which included medication orders, frequent reevaluations of the patient's condition and response to treatment, ordering and reviewing test results, and discussing the case with various consultants.  The critical care time associated with the care of the patient was 35 minutes. Review chart for interventions. This time is exclusive of any other billable procedures.          Electronically signed by: Jona Mohr M.D., 3/23/2024 1:33 PM

## 2024-03-23 NOTE — H&P
"Hospital Medicine History & Physical Note    Date of Service  3/23/2024    Primary Care Physician  Alex Bunch M.D.    Consultants  neurology and vascular surgery    Specialist Names: Robert LLANOS and Aly Tarango MD    Code Status  Full Code    Chief Complaint  Chief Complaint   Patient presents with    Hypertension     217/120    Possible Stroke     Last known well 1200 today       History of Presenting Illness  Marco Echeverria is a 79 y.o. male w/ hx of HTN, throat cancer, hypothroid, prior CVA 4 yrs ago.  Today he \"dozed off\" and when he awoke he states he was talking to his wife but she states all that came out of his mouth was gibberish.  After ~15 minutes the symptoms started to resolve.  He had no other extremity weakness.  Work up in the ER was concerning for moderate left carotid stenosis on CTA neck.  He was admitted 3/23/2024 with carotid stenosis with symptomatic issue of TIA.  Neurology evaluated in ER as did Dr Tarango.  Vascular wants to take him for CEA 3/24.     On evaluation in ED he is alert with clear speech and follows commands.    I discussed the plan of care with patient.    Review of Systems  Review of Systems   Constitutional:  Negative for malaise/fatigue and weight loss.   HENT:  Negative for hearing loss, sore throat and tinnitus.    Eyes:  Negative for blurred vision and double vision.   Respiratory:  Negative for shortness of breath and stridor.    Cardiovascular:  Negative for palpitations.   Gastrointestinal:  Negative for abdominal pain and nausea.   Musculoskeletal:  Negative for myalgias and neck pain.   Neurological:  Positive for speech change. Negative for dizziness, sensory change, focal weakness, weakness and headaches.   Psychiatric/Behavioral:  Negative for depression and substance abuse. The patient is not nervous/anxious.        Past Medical History   has a past medical history of CATARACT, History of malignant neoplasm of tonsil (2011), History of tongue " cancer (2011), Late effect of radiation, and Radiation caries.    Surgical History   has a past surgical history that includes other (1990s); cataract extraction with iol; septoplasty (7/15/2010); antrostomy (7/15/2010); ethmoidectomy (7/15/2010); tonsillectomy; laryngoscopy (8/23/2011); tumor excision with biopsy (8/23/2011); vasectomy; and pr thromboendartectmy neck,neck incis (Right, 7/13/2020).     Family History  family history includes Hypertension in his mother.   Family history reviewed with patient. There is no family history that is pertinent to the chief complaint.     Social History   reports that he has quit smoking. He quit smokeless tobacco use about 48 years ago. He reports current alcohol use. He reports that he does not use drugs.    Allergies  No Known Allergies    Medications  Prior to Admission Medications   Prescriptions Last Dose Informant Patient Reported? Taking?   atorvastatin (LIPITOR) 80 MG tablet 3/23/2024 at 0800 Patient, Family Member Yes Yes   Sig: Take 80 mg by mouth every day.   levothyroxine (SYNTHROID) 50 MCG Tab 3/23/2024 at 0730 Patient, Family Member Yes Yes   Sig: Take 50 mcg by mouth every morning on an empty stomach.   nebivolol (BYSTOLIC) 10 MG Tab 3/22/2024 at 1930 Patient, Family Member Yes Yes   Sig: Take 10 mg by mouth every evening.      Facility-Administered Medications: None       Physical Exam  Temp:  [36.3 °C (97.4 °F)-37 °C (98.6 °F)] 37 °C (98.6 °F)  Pulse:  [60-76] 60  Resp:  [16-20] 20  BP: (155-238)/() 177/110  SpO2:  [89 %-97 %] 96 %  Blood Pressure : (!) 169/97   Temperature: 36.3 °C (97.4 °F)   Pulse: 62   Respiration: 20   Pulse Oximetry: 90 %       Physical Exam  Vitals reviewed.   Constitutional:       Appearance: Normal appearance. He is not diaphoretic.   HENT:      Head: Normocephalic and atraumatic.      Nose: Nose normal.      Mouth/Throat:      Mouth: Mucous membranes are moist.      Pharynx: No oropharyngeal exudate.   Eyes:      General: No  "scleral icterus.        Right eye: No discharge.         Left eye: No discharge.      Extraocular Movements: Extraocular movements intact.      Conjunctiva/sclera: Conjunctivae normal.   Neck:      Vascular: No carotid bruit.   Cardiovascular:      Rate and Rhythm: Normal rate and regular rhythm.      Pulses:           Radial pulses are 2+ on the right side and 2+ on the left side.        Dorsalis pedis pulses are 2+ on the right side and 2+ on the left side.      Heart sounds: No murmur heard.  Pulmonary:      Effort: Pulmonary effort is normal. No respiratory distress.      Breath sounds: Normal breath sounds. No wheezing or rales.   Abdominal:      General: Bowel sounds are normal. There is no distension.      Palpations: Abdomen is soft.   Musculoskeletal:         General: No swelling or tenderness.      Cervical back: Neck supple. No muscular tenderness.      Right lower leg: No edema.      Left lower leg: No edema.   Lymphadenopathy:      Cervical: No cervical adenopathy.   Skin:     Coloration: Skin is not jaundiced or pale.   Neurological:      General: No focal deficit present.      Mental Status: He is alert and oriented to person, place, and time. Mental status is at baseline.      Cranial Nerves: No cranial nerve deficit.   Psychiatric:         Mood and Affect: Mood normal.         Behavior: Behavior normal.         Laboratory:  Recent Labs     03/23/24  1329   WBC 4.4*   RBC 5.11   HEMOGLOBIN 16.4   HEMATOCRIT 46.5   MCV 91.0   MCH 32.1   MCHC 35.3   RDW 42.4   PLATELETCT 174   MPV 9.2     Recent Labs     03/23/24  1329   SODIUM 132*   POTASSIUM 4.6   CHLORIDE 95*   CO2 24   GLUCOSE 140*   BUN 11   CREATININE 0.65   CALCIUM 9.5     Recent Labs     03/23/24  1329   ALTSGPT 23   ASTSGOT 28   ALKPHOSPHAT 81   TBILIRUBIN 0.8   GLUCOSE 140*     Recent Labs     03/23/24  1329   APTT 28.1   INR 0.98     No results for input(s): \"NTPROBNP\" in the last 72 hours.      Recent Labs     03/23/24  1329   TROPONINT 13 "       Imaging:  DX-CHEST-PORTABLE (1 VIEW)   Final Result         1. No acute cardiopulmonary abnormalities are identified.      CT-CTA NECK WITH & W/O-POST PROCESSING   Final Result      1. Moderate stenosis at the origin of the left internal carotid artery, worse than 2020.      CT-CTA HEAD WITH & W/O-POST PROCESS   Final Result         1. No hemodynamically significant narrowing of the major intracranial vessels.      CT-CEREBRAL PERFUSION ANALYSIS   Final Result      1.  Cerebral blood flow less than 30% likely representing completed infarct = 0 mL.      2.  T Max more than 6 seconds likely representing combination of completed infarct and ischemia = 0 mL.      3.  Mismatched volume likely representing ischemic brain/penumbra = None      4.  Please note that the cerebral perfusion was performed on the limited brain tissue around the basal ganglia region. Infarct/ischemia outside the CT perfusion sections can be missed in this study.            Assessment/Plan:  Justification for Admission Status  I anticipate this patient will require at least two midnights for appropriate medical management, necessitating inpatient admission because need of carotid endarterectomy and neurologic monitor given TIA     Patient will need a Med/Surg bed on MEDICAL service .  The need is secondary to neurologic monitor.    * Carotid stenosis, symptomatic w/o infarct, left- (present on admission)  Assessment & Plan  Hx of radation for head/neck cancer and question if contributed to hx of carotid stenosis.  Vascular Dr Tarango consulting and will take to OR 3/24  Statin and plavix  BP control      Hypothyroidism- (present on admission)  Assessment & Plan  Levothyroxine  Check TSH as last TSH:11.7 over three years ago.    Hyperlipidemia- (present on admission)  Assessment & Plan  Statin  Check lipids    History of Acute ischemic stroke (HCC) 2/2 to bilateral ICA stenosis- (present on admission)  Assessment & Plan  Had prior right CEA  after CVA 4 years ago  Statin, BP control  Neurology consulting  plavix        VTE prophylaxis: SCDs/TEDs

## 2024-03-23 NOTE — ED TRIAGE NOTES
"Chief Complaint   Patient presents with    Hypertension     217/120    Possible Stroke     Last known well 1200 today       Pt woke up from nap around 1200 today with expressive aphasia witnessed by wife.  Episode lasting approx 30 min.  PMH stroke, HTN, throat CA. No recent chemo or radiation.Denies blood thinners, ASA, DM. No neuro symptoms currently.  Stroke called pt taken to charge desk for assessment.     Blood Pressure : (!) 217/120, Pulse: 66, Respiration: 16, Temperature: 36.3 °C (97.4 °F), Height: 188 cm (6' 2\"), Weight: 74.9 kg (165 lb 2 oz), BMI (Calculated): 21.2, BSA (Calculated): 2, Pulse Oximetry: 95 %, O2 Delivery Device: None - Room Air    "

## 2024-03-23 NOTE — ED NOTES
Pt brought into room from CT. Pt hooked up to continuous monitor. No neuro deficits at this time.    (-) blood thinners. Hx HTN

## 2024-03-23 NOTE — CONSULTS
Chief Complaint   Patient presents with    Hypertension     217/120    Possible Stroke     Last known well 1200 today         Neurology Initial Consult H&P  Vascular Neurology Service, [facility]    History of present illness:  Marco Echeverria is a 79 y.o. male with a PMHx of hypertension, throat cancer, tongue cancer, hypothyroidism and previous CVA approximately 4 years ago who presented on 03/23/2024 with acute onset word finding difficulties.  Patient reports taking a nap and waking up at approximately noon and the symptoms lasted for probably 15 minutes according to patient.  At this time he is completely nonfocal.  Noncontrast CT head was negative for acute intracranial abnormalities.  CTA head/neck was negative for LVO and dissection but reveals moderate left ICA stenosis.  CT perfusion was negative for CBF defect.  Neurology has been consulted for further evaluation of the above.    Referring Provider: Jona Mohr M.D. has consulted Neurology for further evaluation    Past medical history:   Past Medical History:   Diagnosis Date    CATARACT     has had surgical correction bilaterally    History of malignant neoplasm of tonsil 2011    s/p Resection, chemoradiation    History of tongue cancer 2011    s/p Resection, chemoradiation    Late effect of radiation     Radiation caries        Past surgical history:   Past Surgical History:   Procedure Laterality Date    IN THROMBOENDARTECTMY NECK,NECK INCIS Right 7/13/2020    Procedure: ENDARTERECTOMY, CAROTID;  Surgeon: Ofelia Tarango M.D.;  Location: SURGERY [facility];  Service: General    LARYNGOSCOPY  8/23/2011    Performed by OFELIA PORTER at SURGERY SAME DAY [facility]    TUMOR EXCISION WITH BIOPSY  8/23/2011    Performed by OFELIA PORTER at SURGERY SAME DAY [facility]    SEPTOPLASTY  7/15/2010    Performed by OFELIA PORTER at SURGERY SAME DAY [facility]    ANTROSTOMY  7/15/2010    Performed by OFELIA PORTER at  SURGERY [facility] DAY [facility]    ETHMOIDECTOMY  7/15/2010    Performed by OFELIA PORTER at SURGERY SAME DAY [facility]    CATARACT EXTRACTION WITH IOL      herminio eyes    OTHER  1990s    benign tumor right face    TONSILLECTOMY      VASECTOMY         Family history:   Family History   Problem Relation Age of Onset    Hypertension Mother        Social history:   Social History     Socioeconomic History    Marital status:      Spouse name: Hetal    Number of children: 2    Years of education: Not on file    Highest education level: Not on file   Occupational History    Not on file   Tobacco Use    Smoking status: Former    Smokeless tobacco: Former     Quit date: 5/1/1975    Tobacco comments:     1-2 pk a day for 15 yrs   Substance and Sexual Activity    Alcohol use: Yes     Comment: 1 a month    Drug use: No    Sexual activity: Not on file   Other Topics Concern    Not on file   Social History Narrative    41 years -  - now retired.  Retired 4/1/2007     Social Determinants of Health     Financial Resource Strain: Not on file   Food Insecurity: Not on file   Transportation Needs: Not on file   Physical Activity: Not on file   Stress: Not on file   Social Connections: Not on file   Intimate Partner Violence: Not on file   Housing Stability: Not on file       Current medications:   Current Facility-Administered Medications   Medication Dose    clopidogrel (Plavix) tablet 75 mg  75 mg     Current Outpatient Medications   Medication    Multiple Vitamins-Minerals (PRESERVISION AREDS 2+MULTI VIT PO)       Medication Allergy:  No Known Allergies    Review of systems:   Constitutional: denies fever, night sweats, weight loss.   Eyes: denies acute vision change, eye pain or secretion.   Ears, Nose, Mouth, Throat: denies nasal secretion, nasal bleeding, difficulty swallowing, hearing loss, tinnitus, vertigo, ear pain, acute dental problems, oral ulcers or lesions.   Endocrine: denies recent weight  "changes, heat or cold intolerance, polyuria, polydypsia, polyphagia,abnormal hair growth.  Cardiovascular: denies new onset of chest pain, palpitations, syncope, or dyspnea of exertion.  Pulmonary: denies shortness of breath, new onset of cough, hemoptysis, wheezing, chest pain or flu-like symptoms.   GI: denies nausea, vomiting, diarrhea, GI bleeding, change in appetite, abdominal pain, and change in bowel habits.  : denies dysuria, urinary incontinence, hematuria.  Heme/oncology: denies history of easy bruising or bleeding. No history of cancer, DVTor PE.  Allergy/immunology: denies hives/urticaria, or itching.   Dermatologic: denies new rash, or new skin lesions.  Musculoskeletal:denies joint swelling or pain, muscle pain, neck and back pain.   Neurologic: denies headaches, acute visual changes, facial droopiness, muscle weakness (focal or generalized), paresthesias, anesthesia, ataxia, reports transient word finding difficulties, denies memory loss, abnormal movements, seizures, loss of consciousness, or episodes of confusion.   Psychiatric: denies symptoms of depression, anxiety, hallucinations, mood swings or changes, suicidal or homicidal thoughts.     Physical examination:   Vitals:    03/23/24 1256 03/23/24 1313 03/23/24 1353 03/23/24 1520   BP: (!) 217/120  (!) 238/120 (!) 191/94   Pulse: 66  76    Resp: 16  19    Temp: 36.3 °C (97.4 °F)      TempSrc: Temporal      SpO2: 95%  94%    Weight:  74.9 kg (165 lb 2 oz)     Height:  1.88 m (6' 2\")       General: Patient in no acute distress, pleasant and cooperative.  HEENT: Normocephalic, no signs of acute trauma.   Neck: supple, no meningeal signs or carotid bruits. There is normal range of motion. No tenderness on exam.   Chest: clear to auscultation. No cough.   CV: RRR, no murmurs.   Skin: no signs of acute rashes or trauma.   Musculoskeletal: joints exhibit full range of motion, without any pain to palpation. There are no signs of joint or muscle " swelling. There is no tenderness to deep palpation of muscles.   Psychiatric: No hallucinatory behavior. Denies symptoms of depression or suicidal ideation. Mood and affect appear normal on exam.     NEUROLOGICAL EXAM:   Mental status, orientation: Awake, alert and fully oriented.   Speech and language: speech is clear and fluent. The patient is able to name, repeat and comprehend.   Memory: There is intact recollection of recent and remote events.   Cranial nerve exam: Pupils are 3-4 mm bilaterally and equally reactive to light and accommodation. Visual fields are intact by confrontation. There is no nystagmus on primary or secondary gaze. Intact full EOM in all directions of gaze. Face appears symmetric. Sensation in the face is intact to light touch. Uvula is midline. Tongue is midline. Shoulder shrug is intact bilaterally.   Motor exam: Strength is 5/5 in all extremities. Tone is normal.   Sensory exam reveals normal sense of light touch, proprioception, vibration and pinprick in all extremities.   Deep tendon reflexes:  2+ throughout. Plantar responses are flexor. There is no clonus.   Coordination: shows a normal finger-nose-finger. No ataxia noted  Gait: Not assessed due to patient preference    NIHSS: National Institutes of Health Stroke Scale    [0] 1a:Level of Consciousness    0-alert 1-drowsy   2-stupor   3-coma  [0] 1b:LOC Questions                  0-both  1-one      2-neither  [0] 1c:LOC Commands                   0-both  1-one      2-neither  [0] 2: Best Gaze                     0-nl    1-partial  2-forced  [0] 3: Visual Fields                   0-nl    1-partial  2-complete 3-bilat  [0] 4: Facial Paresis                0-nl    1-minor    2-partial  3-full  MOTOR                       0-nl  [0] 5: Right Arm           1-drift  [0] 6: Left Arm             2-some effort vs gravity  [0] 7: Right Leg           3-no effort vs gravity  [0] 8: Left Leg             4-no movement                              x-untestable  [0] 9: Limb Ataxia                    0-abs   1-1_limb   2-2+_limbs       x-untestable  [0] 10:Sensory                        0-nl    1-partial  2-dense  [0] 11:Best Language/Aphasia         0-nl    1-mild/mod 2-severe   3-mute  [0] 12:Dysarthria                     0-nl    1-mild/mod 2-severe       x-untestable  [0] 13:Neglect/Inattention            0-none  1-partial  2-complete  [0] TOTAL    NIHSS Time: 2024 @ 1527    ANCILLARY DATA REVIEWED:     Lab Data Review:  Recent Results (from the past 24 hour(s))   EKG    Collection Time: 24 12:58 PM   Result Value Ref Range    Report       [facility] Emergency Dept[facility]    Test Date:  2024  Pt Name:    CLAU RIDDLE             Department: ER  MRN:        [id number]                      Room:  Gender:     Male                         Technician: [id number]  :        1945                   Requested By:ER TRIAGE PROTOCOL  Order #:    [id number]                    Reading MD:    Measurements  Intervals                                Axis  Rate:       72                           P:          49  ND:         176                          QRS:        11  QRSD:       92                           T:          31  QT:         389  QTc:        426    Interpretive Statements  Sinus rhythm  Left atrial enlargement  Compared to ECG 2020 05:56:02  Atrial abnormality now present     CBC WITH DIFFERENTIAL    Collection Time: 24  1:29 PM   Result Value Ref Range    WBC 4.4 (L) 4.8 - 10.8 K/uL    RBC 5.11 4.70 - 6.10 M/uL    Hemoglobin 16.4 14.0 - 18.0 g/dL    Hematocrit 46.5 42.0 - 52.0 %    MCV 91.0 81.4 - 97.8 fL    MCH 32.1 27.0 - 33.0 pg    MCHC 35.3 32.3 - 36.5 g/dL    RDW 42.4 35.9 - 50.0 fL    Platelet Count 174 164 - 446 K/uL    MPV 9.2 9.0 - 12.9 fL    Neutrophils-Polys 69.70 44.00 - 72.00 %    Lymphocytes 14.70 (L) 22.00 - 41.00 %    Monocytes 12.90 0.00 - 13.40 %    Eosinophils 1.80 0.00 - 6.90 %    Basophils  0.70 0.00 - 1.80 %    Immature Granulocytes 0.20 0.00 - 0.90 %    Nucleated RBC 0.00 0.00 - 0.20 /100 WBC    Neutrophils (Absolute) 3.09 1.82 - 7.42 K/uL    Lymphs (Absolute) 0.65 (L) 1.00 - 4.80 K/uL    Monos (Absolute) 0.57 0.00 - 0.85 K/uL    Eos (Absolute) 0.08 0.00 - 0.51 K/uL    Baso (Absolute) 0.03 0.00 - 0.12 K/uL    Immature Granulocytes (abs) 0.01 0.00 - 0.11 K/uL    NRBC (Absolute) 0.00 K/uL   COMP METABOLIC PANEL    Collection Time: 03/23/24  1:29 PM   Result Value Ref Range    Sodium 132 (L) 135 - 145 mmol/L    Potassium 4.6 3.6 - 5.5 mmol/L    Chloride 95 (L) 96 - 112 mmol/L    Co2 24 20 - 33 mmol/L    Anion Gap 13.0 7.0 - 16.0    Glucose 140 (H) 65 - 99 mg/dL    Bun 11 8 - 22 mg/dL    Creatinine 0.65 0.50 - 1.40 mg/dL    Calcium 9.5 8.5 - 10.5 mg/dL    Correct Calcium 8.9 8.5 - 10.5 mg/dL    AST(SGOT) 28 12 - 45 U/L    ALT(SGPT) 23 2 - 50 U/L    Alkaline Phosphatase 81 30 - 99 U/L    Total Bilirubin 0.8 0.1 - 1.5 mg/dL    Albumin 4.8 3.2 - 4.9 g/dL    Total Protein 7.0 6.0 - 8.2 g/dL    Globulin 2.2 1.9 - 3.5 g/dL    A-G Ratio 2.2 g/dL   PROTHROMBIN TIME    Collection Time: 03/23/24  1:29 PM   Result Value Ref Range    PT 13.1 12.0 - 14.6 sec    INR 0.98 0.87 - 1.13   APTT    Collection Time: 03/23/24  1:29 PM   Result Value Ref Range    APTT 28.1 24.7 - 36.0 sec   COD (ADULT)    Collection Time: 03/23/24  1:29 PM   Result Value Ref Range    ABO Grouping Only UNDET (A)     Rh Grouping Only POS     Antibody Screen-Cod NEG    TROPONIN    Collection Time: 03/23/24  1:29 PM   Result Value Ref Range    Troponin T 13 6 - 19 ng/L   ESTIMATED GFR    Collection Time: 03/23/24  1:29 PM   Result Value Ref Range    GFR (CKD-EPI) 96 >60 mL/min/1.73 m 2       Labs reviewed by me.       Imaging reviewed by me:     DX-CHEST-PORTABLE (1 VIEW)   Final Result         1. No acute cardiopulmonary abnormalities are identified.      CT-CTA NECK WITH & W/O-POST PROCESSING   Final Result      1. Moderate stenosis at the  origin of the left internal carotid artery, worse than 2020.      CT-CTA HEAD WITH & W/O-POST PROCESS   Final Result         1. No hemodynamically significant narrowing of the major intracranial vessels.      CT-CEREBRAL PERFUSION ANALYSIS   Final Result      1.  Cerebral blood flow less than 30% likely representing completed infarct = 0 mL.      2.  T Max more than 6 seconds likely representing combination of completed infarct and ischemia = 0 mL.      3.  Mismatched volume likely representing ischemic brain/penumbra = None      4.  Please note that the cerebral perfusion was performed on the limited brain tissue around the basal ganglia region. Infarct/ischemia outside the CT perfusion sections can be missed in this study.            ASSESSMENT AND PLAN:    Assessment and Plan:    79-year-old male presenting with acute onset word finding difficulties that lasted approximately 10 minutes upon waking up from a nap around noon today.  Symptoms have completely resolved at time of exam and patient has nonfocal.  Noncontrast CT head was negative for acute intracranial abnormalities.  CTA head/neck were negative for LVO, and dissection but reveals moderate stenosis of the left ICA just distal from the bifurcation.  Perfusion study was negative for CBF defect.  At this time we recommend medical management with DAPT and obtaining an MRI brain.  Pending results of MRI brain may determine further workup.  As this does not appear to be flow-limiting stenosis no role for a hypertensive response at this time.     Problem list:  TIA  Moderate Stenosis of Left ICA      Recommendations:  -q4h and PRN neuro assessment. VS per nursing/unit protocol.   -BP goal is normotension, 100-130/60-80. Antihypertensives per primary team.   -Obtain MRI Brain wo contrast.   -Telemetry- If MRI brain reveals an embolic appearing infarct, may consider TTE  -Check Stroke Labs: Lipid panel and HgbA1c  -Recommend DAPT with ASA 81mg daily and Plavix  75mg daily x21 days. Monotherapy with ASA 81mg daily thereafter  -Atorvastatin 40 mg PO q HS. Titrate to long term LDL goal < 70  -BG management per primary team. BG goal . A1C goal <7  -PT/OT/SLP eval and treat   -DVT PPX: SCDs     Case reviewed and plan created with Dr. Sammy Hannon, Acute Neurology. Please call with any questions      DAWOOD Bonds.  Vascular Neurology, [facility]

## 2024-03-23 NOTE — CONSULTS
Vascular Surgery          New Patient Consultation    Patient:Marco Echeverria  MRN:[id number]    Date: 3/23/2024    Referring Provider: Steven Vega D.o.    Consulting Physician: Ofelia Tarango MD  _____________________________________________________    Reason for consultation:  Evaluate patient with suspected symptomatic left carotid artery stenosis    HPI:  This is a 79 y.o. male who approximately 4 years ago underwent a right carotid endarterectomy.  The patient was in his usual state of health until earlier today when he developed symptoms of expressive aphasia and word salad.  The patient's wife reports that he was speaking garbled speech.  She reports that this focal neurologic deficit persisted for approximately 10 minutes.  There was no other neurologic complaints noted.  The patient denied any right upper or lower extremity weakness or numbness.  A CT scan of the patient's carotid artery demonstrates moderate disease within the left carotid artery with soft atheromatous plaque noted.    Past Medical History:   Diagnosis Date    CATARACT     has had surgical correction bilaterally    History of malignant neoplasm of tonsil 2011    s/p Resection, chemoradiation    History of tongue cancer 2011    s/p Resection, chemoradiation    Late effect of radiation     Radiation caries        Past Surgical History:   Procedure Laterality Date    [place] THROMBOENDARTECTMY NECK,NECK INCIS Right 7/13/2020    Procedure: ENDARTERECTOMY, CAROTID;  Surgeon: Ofelia Tarango M.D.;  Location: SURGERY [facility];  Service: General    LARYNGOSCOPY  8/23/2011    Performed by OFELIA PORTER at SURGERY SAME DAY [facility]    TUMOR EXCISION WITH BIOPSY  8/23/2011    Performed by OFELIA PORTER at SURGERY SAME DAY [facility]    SEPTOPLASTY  7/15/2010    Performed by OFELIA PORTER at SURGERY SAME DAY [facility]    ANTROSTOMY  7/15/2010    Performed by OFELIA PORTER at SURGERY SAME DAY [facility]     ETHMOIDECTOMY  7/15/2010    Performed by OFELIA PORTER at SURGERY SAME DAY [facility]    CATARACT EXTRACTION WITH IOL      herminio eyes    OTHER  1990s    benign tumor right face    TONSILLECTOMY      VASECTOMY         Current Facility-Administered Medications   Medication Dose Route Frequency Provider Last Rate Last Admin    atorvastatin (Lipitor) tablet 80 mg  80 mg Oral DAILY Steven eVga D.O.        levothyroxine (Synthroid) tablet 50 mcg  50 mcg Oral AM ES Steven Vega D.O.        nebivolol (Bystolic) tablet 10 mg  10 mg Oral Q EVENING ZEYAD OhOEric        lactated ringers infusion   Intravenous Continuous Steven Vega D.O.        acetaminophen (Tylenol) tablet 650 mg  650 mg Oral Q6HRS PRN Steven Vega D.O.        labetalol (Normodyne/Trandate) injection 10 mg  10 mg Intravenous Q4HRS PRN Steven Vega D.O.        ondansetron (Zofran) syringe/vial injection 4 mg  4 mg Intravenous Q4HRS PRN Steven Vega D.O.        ondansetron (Zofran ODT) dispertab 4 mg  4 mg Oral Q4HRS PRN Steven Vega D.O.         Current Outpatient Medications   Medication Sig Dispense Refill    nebivolol (BYSTOLIC) 10 MG Tab Take 10 mg by mouth every evening.      levothyroxine (SYNTHROID) 50 MCG Tab Take 50 mcg by mouth every morning on an empty stomach.      atorvastatin (LIPITOR) 80 MG tablet Take 80 mg by mouth every day.         Social History     Socioeconomic History    Marital status:      Spouse name: Hetal    Number of children: 2    Years of education: Not on file    Highest education level: Not on file   Occupational History    Not on file   Tobacco Use    Smoking status: Former    Smokeless tobacco: Former     Quit date: 5/1/1975    Tobacco comments:     1-2 pk a day for 15 yrs   Substance and Sexual Activity    Alcohol use: Yes     Comment: 1 a month    Drug use: No    Sexual activity: Not on file   Other Topics Concern    Not on file   Social History Narrative    41 years -   "- now retired.  Retired 4/1/2007     Social Determinants of Health     Financial Resource Strain: Not on file   Food Insecurity: Not on file   Transportation Needs: Not on file   Physical Activity: Not on file   Stress: Not on file   Social Connections: Not on file   Intimate Partner Violence: Not on file   Housing Stability: Not on file       Family History   Problem Relation Age of Onset    Hypertension Mother        Allergies:  Patient has no known allergies.    Review of Systems:    Constitutional: Negative for fever or chills  HENT:   Negative for hearing loss or tinnitus    Eyes:    Negative for blurred vision or loss of vision  Respiratory:  Negative for cough or hemoptysis  Cardiac:  Negative for chest pain or palpitations  Vascular:  Negative for claudication, Negative for rest pain  Gastrointestinal: Negative for vomiting or abdominal pain     Negative for hematochezia or melena   Genitourinary: Negative for dysuria or hematuria   Musculoskeletal: Negative for myalgias or acute joint pain  Skin:   Negative for itching or rash  Neurological:  Expressive aphasia with word salad lasting approximately 10 minutes with complete resolution   Endo/Heme:  Negative for easy bruising or bleeding    Physical Exam:  BP (!) 169/97   Pulse 62   Temp 36.3 °C (97.4 °F) (Temporal)   Resp 20   Ht 1.88 m (6' 2\")   Wt 74.9 kg (165 lb 2 oz)   SpO2 90%   BMI 21.20 kg/m²     Constitutional: Alert, oriented, no acute distress  HEENT:  Normocephalic and atraumatic, EOMI  Neck:   Supple, no JVD,   Cardiovascular: Regular rate and rhythm,   Pulmonary:  Good air entry bilaterally,    Abdominal:  Soft, non-tender, non-distended         Musculoskeletal: No tenderness, no deformity  Neurological:  CN II-XII grossly intact, no focal deficits  Skin:   Skin is warm and dry. No rash noted.  Vascular exam: Lower extremities well-perfused       Labs:  Recent Labs     03/23/24  1329   WBC 4.4*   RBC 5.11   HEMOGLOBIN 16.4   HEMATOCRIT " 46.5   MCV 91.0   MCH 32.1   MCHC 35.3   RDW 42.4   PLATELETCT 174   MPV 9.2     Recent Labs     03/23/24  1329   SODIUM 132*   POTASSIUM 4.6   CHLORIDE 95*   CO2 24   GLUCOSE 140*   BUN 11   CREATININE 0.65   CALCIUM 9.5     Recent Labs     03/23/24  1329   APTT 28.1   INR 0.98     Recent Labs     03/23/24  1329   ASTSGOT 28   ALTSGPT 23   TBILIRUBIN 0.8   ALKPHOSPHAT 81   GLOBULIN 2.2   INR 0.98         Radiology:    CTA 3/23/2024    IMPRESSION:     1. Moderate stenosis at the origin of the left internal carotid artery, worse than 2020.    Assessment/Plan:   -Suspected symptomatic left carotid artery stenosis with episode of expressive aphasia    Based on the soft atheromatous plaque seen in the left carotid artery I believe that this may very well represent a source of embolic material.    I have recommended a left carotid endarterectomy.  Plavix 75 mg daily  N.p.o. after midnight    Discussed the risk benefits alternatives expectations patient agrees to proceed  Plan left carotid endarterectomy with intraoperative EEG monitoring tomorrow.            Aly Tarango MD  [facility]   Voalte preferred or call my office [phone]  __________________________________________________________________  Patient:Marco Echeverria   MRN:[id number]   CSN:[id number]

## 2024-03-24 ENCOUNTER — ANESTHESIA (OUTPATIENT)
Dept: SURGERY | Facility: MEDICAL CENTER | Age: 79
End: 2024-03-24
Payer: COMMERCIAL

## 2024-03-24 ENCOUNTER — ANESTHESIA EVENT (OUTPATIENT)
Dept: SURGERY | Facility: MEDICAL CENTER | Age: 79
End: 2024-03-24
Payer: COMMERCIAL

## 2024-03-24 LAB
ANION GAP SERPL CALC-SCNC: 12 MMOL/L (ref 7–16)
BUN SERPL-MCNC: 10 MG/DL (ref 8–22)
CALCIUM SERPL-MCNC: 9 MG/DL (ref 8.5–10.5)
CHLORIDE SERPL-SCNC: 96 MMOL/L (ref 96–112)
CHOLEST SERPL-MCNC: 136 MG/DL (ref 100–199)
CO2 SERPL-SCNC: 22 MMOL/L (ref 20–33)
CREAT SERPL-MCNC: 0.62 MG/DL (ref 0.5–1.4)
ERYTHROCYTE [DISTWIDTH] IN BLOOD BY AUTOMATED COUNT: 42.9 FL (ref 35.9–50)
GFR SERPLBLD CREATININE-BSD FMLA CKD-EPI: 98 ML/MIN/1.73 M 2
GLUCOSE SERPL-MCNC: 90 MG/DL (ref 65–99)
HCT VFR BLD AUTO: 43.7 % (ref 42–52)
HDLC SERPL-MCNC: 68 MG/DL
HGB BLD-MCNC: 15.4 G/DL (ref 14–18)
LDLC SERPL CALC-MCNC: 57 MG/DL
MCH RBC QN AUTO: 32.3 PG (ref 27–33)
MCHC RBC AUTO-ENTMCNC: 35.2 G/DL (ref 32.3–36.5)
MCV RBC AUTO: 91.6 FL (ref 81.4–97.8)
PLATELET # BLD AUTO: 155 K/UL (ref 164–446)
PMV BLD AUTO: 9.4 FL (ref 9–12.9)
POTASSIUM SERPL-SCNC: 4.4 MMOL/L (ref 3.6–5.5)
RBC # BLD AUTO: 4.77 M/UL (ref 4.7–6.1)
SODIUM SERPL-SCNC: 130 MMOL/L (ref 135–145)
TRIGL SERPL-MCNC: 57 MG/DL (ref 0–149)
WBC # BLD AUTO: 3.9 K/UL (ref 4.8–10.8)

## 2024-03-24 PROCEDURE — 110371 HCHG SHELL REV 272: Performed by: SURGERY

## 2024-03-24 PROCEDURE — 80048 BASIC METABOLIC PNL TOTAL CA: CPT

## 2024-03-24 PROCEDURE — 160028 HCHG SURGERY MINUTES - 1ST 30 MINS LEVEL 3: Performed by: SURGERY

## 2024-03-24 PROCEDURE — 700101 HCHG RX REV CODE 250: Performed by: ANESTHESIOLOGY

## 2024-03-24 PROCEDURE — 85027 COMPLETE CBC AUTOMATED: CPT

## 2024-03-24 PROCEDURE — 95955 EEG DURING SURGERY: CPT | Performed by: SURGERY

## 2024-03-24 PROCEDURE — 700105 HCHG RX REV CODE 258: Performed by: HOSPITALIST

## 2024-03-24 PROCEDURE — 160009 HCHG ANES TIME/MIN: Performed by: SURGERY

## 2024-03-24 PROCEDURE — 03UJ0KZ SUPPLEMENT LEFT COMMON CAROTID ARTERY WITH NONAUTOLOGOUS TISSUE SUBSTITUTE, OPEN APPROACH: ICD-10-PCS | Performed by: SURGERY

## 2024-03-24 PROCEDURE — A9270 NON-COVERED ITEM OR SERVICE: HCPCS | Performed by: HOSPITALIST

## 2024-03-24 PROCEDURE — 95940 IONM IN OPERATNG ROOM 15 MIN: CPT | Performed by: SURGERY

## 2024-03-24 PROCEDURE — 35301 RECHANNELING OF ARTERY: CPT | Mod: AS,LT | Performed by: NURSE PRACTITIONER

## 2024-03-24 PROCEDURE — 160048 HCHG OR STATISTICAL LEVEL 1-5: Performed by: SURGERY

## 2024-03-24 PROCEDURE — 700102 HCHG RX REV CODE 250 W/ 637 OVERRIDE(OP): Performed by: HOSPITALIST

## 2024-03-24 PROCEDURE — 700101 HCHG RX REV CODE 250: Performed by: SURGERY

## 2024-03-24 PROCEDURE — 700111 HCHG RX REV CODE 636 W/ 250 OVERRIDE (IP): Performed by: SURGERY

## 2024-03-24 PROCEDURE — 80061 LIPID PANEL: CPT

## 2024-03-24 PROCEDURE — 700101 HCHG RX REV CODE 250: Performed by: NURSE PRACTITIONER

## 2024-03-24 PROCEDURE — 700111 HCHG RX REV CODE 636 W/ 250 OVERRIDE (IP): Performed by: ANESTHESIOLOGY

## 2024-03-24 PROCEDURE — 700105 HCHG RX REV CODE 258: Performed by: ANESTHESIOLOGY

## 2024-03-24 PROCEDURE — 160036 HCHG PACU - EA ADDL 30 MINS PHASE I: Performed by: SURGERY

## 2024-03-24 PROCEDURE — 95938 SOMATOSENSORY TESTING: CPT | Performed by: SURGERY

## 2024-03-24 PROCEDURE — C1751 CATH, INF, PER/CENT/MIDLINE: HCPCS | Performed by: SURGERY

## 2024-03-24 PROCEDURE — C1781 MESH (IMPLANTABLE): HCPCS | Performed by: SURGERY

## 2024-03-24 PROCEDURE — 700105 HCHG RX REV CODE 258: Performed by: SURGERY

## 2024-03-24 PROCEDURE — 770001 HCHG ROOM/CARE - MED/SURG/GYN PRIV*

## 2024-03-24 PROCEDURE — 160039 HCHG SURGERY MINUTES - EA ADDL 1 MIN LEVEL 3: Performed by: SURGERY

## 2024-03-24 PROCEDURE — 35301 RECHANNELING OF ARTERY: CPT | Mod: LT | Performed by: SURGERY

## 2024-03-24 PROCEDURE — 110454 HCHG SHELL REV 250: Performed by: SURGERY

## 2024-03-24 PROCEDURE — 160035 HCHG PACU - 1ST 60 MINS PHASE I: Performed by: SURGERY

## 2024-03-24 PROCEDURE — 160002 HCHG RECOVERY MINUTES (STAT): Performed by: SURGERY

## 2024-03-24 PROCEDURE — 03CJ0ZZ EXTIRPATION OF MATTER FROM LEFT COMMON CAROTID ARTERY, OPEN APPROACH: ICD-10-PCS | Performed by: SURGERY

## 2024-03-24 DEVICE — PATCH .8X8CM XENOSURE BIOLOGIC VASCULAR---ORDER IN MULTIPLES OF 5---: Type: IMPLANTABLE DEVICE | Status: FUNCTIONAL

## 2024-03-24 RX ORDER — ESMOLOL HYDROCHLORIDE 10 MG/ML
INJECTION INTRAVENOUS PRN
Status: DISCONTINUED | OUTPATIENT
Start: 2024-03-24 | End: 2024-03-24 | Stop reason: SURG

## 2024-03-24 RX ORDER — HEPARIN SODIUM 1000 [USP'U]/ML
INJECTION, SOLUTION INTRAVENOUS; SUBCUTANEOUS PRN
Status: DISCONTINUED | OUTPATIENT
Start: 2024-03-24 | End: 2024-03-24 | Stop reason: SURG

## 2024-03-24 RX ORDER — OXYCODONE HYDROCHLORIDE 5 MG/1
5 TABLET ORAL
Status: DISCONTINUED | OUTPATIENT
Start: 2024-03-24 | End: 2024-03-25 | Stop reason: HOSPADM

## 2024-03-24 RX ORDER — OXYCODONE HCL 5 MG/5 ML
10 SOLUTION, ORAL ORAL
Status: DISCONTINUED | OUTPATIENT
Start: 2024-03-24 | End: 2024-03-24 | Stop reason: HOSPADM

## 2024-03-24 RX ORDER — LABETALOL HYDROCHLORIDE 5 MG/ML
10 INJECTION, SOLUTION INTRAVENOUS EVERY 4 HOURS PRN
Status: DISCONTINUED | OUTPATIENT
Start: 2024-03-24 | End: 2024-03-25 | Stop reason: HOSPADM

## 2024-03-24 RX ORDER — ONDANSETRON 2 MG/ML
4 INJECTION INTRAMUSCULAR; INTRAVENOUS EVERY 4 HOURS PRN
Status: DISCONTINUED | OUTPATIENT
Start: 2024-03-24 | End: 2024-03-25 | Stop reason: HOSPADM

## 2024-03-24 RX ORDER — HYDRALAZINE HYDROCHLORIDE 20 MG/ML
INJECTION INTRAMUSCULAR; INTRAVENOUS PRN
Status: DISCONTINUED | OUTPATIENT
Start: 2024-03-24 | End: 2024-03-24 | Stop reason: SURG

## 2024-03-24 RX ORDER — SODIUM CHLORIDE, SODIUM GLUCONATE, SODIUM ACETATE, POTASSIUM CHLORIDE AND MAGNESIUM CHLORIDE 526; 502; 368; 37; 30 MG/100ML; MG/100ML; MG/100ML; MG/100ML; MG/100ML
INJECTION, SOLUTION INTRAVENOUS
Status: DISCONTINUED | OUTPATIENT
Start: 2024-03-24 | End: 2024-03-24 | Stop reason: SURG

## 2024-03-24 RX ORDER — BUPIVACAINE HYDROCHLORIDE AND EPINEPHRINE 5; 5 MG/ML; UG/ML
INJECTION, SOLUTION EPIDURAL; INTRACAUDAL; PERINEURAL
Status: DISCONTINUED | OUTPATIENT
Start: 2024-03-24 | End: 2024-03-24 | Stop reason: HOSPADM

## 2024-03-24 RX ORDER — HALOPERIDOL 5 MG/ML
1 INJECTION INTRAMUSCULAR
Status: DISCONTINUED | OUTPATIENT
Start: 2024-03-24 | End: 2024-03-24 | Stop reason: HOSPADM

## 2024-03-24 RX ORDER — CEFAZOLIN SODIUM 1 G/3ML
INJECTION, POWDER, FOR SOLUTION INTRAMUSCULAR; INTRAVENOUS PRN
Status: DISCONTINUED | OUTPATIENT
Start: 2024-03-24 | End: 2024-03-24 | Stop reason: SURG

## 2024-03-24 RX ORDER — HYDROMORPHONE HYDROCHLORIDE 1 MG/ML
1 INJECTION, SOLUTION INTRAMUSCULAR; INTRAVENOUS; SUBCUTANEOUS
Status: DISCONTINUED | OUTPATIENT
Start: 2024-03-24 | End: 2024-03-24 | Stop reason: HOSPADM

## 2024-03-24 RX ORDER — DIPHENHYDRAMINE HYDROCHLORIDE 50 MG/ML
12.5 INJECTION INTRAMUSCULAR; INTRAVENOUS
Status: DISCONTINUED | OUTPATIENT
Start: 2024-03-24 | End: 2024-03-24 | Stop reason: HOSPADM

## 2024-03-24 RX ORDER — HYDROMORPHONE HYDROCHLORIDE 1 MG/ML
0.4 INJECTION, SOLUTION INTRAMUSCULAR; INTRAVENOUS; SUBCUTANEOUS
Status: DISCONTINUED | OUTPATIENT
Start: 2024-03-24 | End: 2024-03-24 | Stop reason: HOSPADM

## 2024-03-24 RX ORDER — ROCURONIUM BROMIDE 10 MG/ML
INJECTION, SOLUTION INTRAVENOUS PRN
Status: DISCONTINUED | OUTPATIENT
Start: 2024-03-24 | End: 2024-03-24 | Stop reason: SURG

## 2024-03-24 RX ORDER — HYDROMORPHONE HYDROCHLORIDE 1 MG/ML
0.2 INJECTION, SOLUTION INTRAMUSCULAR; INTRAVENOUS; SUBCUTANEOUS
Status: DISCONTINUED | OUTPATIENT
Start: 2024-03-24 | End: 2024-03-24 | Stop reason: HOSPADM

## 2024-03-24 RX ORDER — ONDANSETRON 2 MG/ML
4 INJECTION INTRAMUSCULAR; INTRAVENOUS
Status: DISCONTINUED | OUTPATIENT
Start: 2024-03-24 | End: 2024-03-24 | Stop reason: HOSPADM

## 2024-03-24 RX ORDER — MIDAZOLAM HYDROCHLORIDE 1 MG/ML
1 INJECTION INTRAMUSCULAR; INTRAVENOUS
Status: DISCONTINUED | OUTPATIENT
Start: 2024-03-24 | End: 2024-03-24 | Stop reason: HOSPADM

## 2024-03-24 RX ORDER — LIDOCAINE HYDROCHLORIDE 40 MG/ML
SOLUTION TOPICAL PRN
Status: DISCONTINUED | OUTPATIENT
Start: 2024-03-24 | End: 2024-03-24 | Stop reason: SURG

## 2024-03-24 RX ORDER — DEXAMETHASONE SODIUM PHOSPHATE 4 MG/ML
INJECTION, SOLUTION INTRA-ARTICULAR; INTRALESIONAL; INTRAMUSCULAR; INTRAVENOUS; SOFT TISSUE PRN
Status: DISCONTINUED | OUTPATIENT
Start: 2024-03-24 | End: 2024-03-24 | Stop reason: SURG

## 2024-03-24 RX ORDER — KETOROLAC TROMETHAMINE 15 MG/ML
INJECTION, SOLUTION INTRAMUSCULAR; INTRAVENOUS PRN
Status: DISCONTINUED | OUTPATIENT
Start: 2024-03-24 | End: 2024-03-24 | Stop reason: SURG

## 2024-03-24 RX ORDER — OXYCODONE HCL 5 MG/5 ML
5 SOLUTION, ORAL ORAL
Status: DISCONTINUED | OUTPATIENT
Start: 2024-03-24 | End: 2024-03-24 | Stop reason: HOSPADM

## 2024-03-24 RX ORDER — HYDROMORPHONE HYDROCHLORIDE 1 MG/ML
0.5 INJECTION, SOLUTION INTRAMUSCULAR; INTRAVENOUS; SUBCUTANEOUS
Status: DISCONTINUED | OUTPATIENT
Start: 2024-03-24 | End: 2024-03-25 | Stop reason: HOSPADM

## 2024-03-24 RX ORDER — PHENYLEPHRINE HCL IN 0.9% NACL 0.5 MG/5ML
SYRINGE (ML) INTRAVENOUS PRN
Status: DISCONTINUED | OUTPATIENT
Start: 2024-03-24 | End: 2024-03-24 | Stop reason: SURG

## 2024-03-24 RX ORDER — DIPHENHYDRAMINE HYDROCHLORIDE 50 MG/ML
25 INJECTION INTRAMUSCULAR; INTRAVENOUS EVERY 6 HOURS PRN
Status: DISCONTINUED | OUTPATIENT
Start: 2024-03-24 | End: 2024-03-25 | Stop reason: HOSPADM

## 2024-03-24 RX ORDER — EPHEDRINE SULFATE 50 MG/ML
INJECTION, SOLUTION INTRAVENOUS PRN
Status: DISCONTINUED | OUTPATIENT
Start: 2024-03-24 | End: 2024-03-24 | Stop reason: SURG

## 2024-03-24 RX ORDER — SODIUM CHLORIDE, SODIUM GLUCONATE, SODIUM ACETATE, POTASSIUM CHLORIDE AND MAGNESIUM CHLORIDE 526; 502; 368; 37; 30 MG/100ML; MG/100ML; MG/100ML; MG/100ML; MG/100ML
500 INJECTION, SOLUTION INTRAVENOUS CONTINUOUS
Status: DISCONTINUED | OUTPATIENT
Start: 2024-03-24 | End: 2024-03-24 | Stop reason: HOSPADM

## 2024-03-24 RX ORDER — IPRATROPIUM BROMIDE AND ALBUTEROL SULFATE 2.5; .5 MG/3ML; MG/3ML
3 SOLUTION RESPIRATORY (INHALATION)
Status: DISCONTINUED | OUTPATIENT
Start: 2024-03-24 | End: 2024-03-24 | Stop reason: HOSPADM

## 2024-03-24 RX ORDER — OXYCODONE HYDROCHLORIDE 10 MG/1
10 TABLET ORAL
Status: DISCONTINUED | OUTPATIENT
Start: 2024-03-24 | End: 2024-03-25 | Stop reason: HOSPADM

## 2024-03-24 RX ORDER — PROTAMINE SULFATE 10 MG/ML
INJECTION, SOLUTION INTRAVENOUS PRN
Status: DISCONTINUED | OUTPATIENT
Start: 2024-03-24 | End: 2024-03-24 | Stop reason: SURG

## 2024-03-24 RX ORDER — GLYCOPYRROLATE 0.2 MG/ML
INJECTION INTRAMUSCULAR; INTRAVENOUS PRN
Status: DISCONTINUED | OUTPATIENT
Start: 2024-03-24 | End: 2024-03-24 | Stop reason: SURG

## 2024-03-24 RX ORDER — ONDANSETRON 2 MG/ML
INJECTION INTRAMUSCULAR; INTRAVENOUS PRN
Status: DISCONTINUED | OUTPATIENT
Start: 2024-03-24 | End: 2024-03-24 | Stop reason: SURG

## 2024-03-24 RX ORDER — CLONIDINE HYDROCHLORIDE 0.1 MG/1
0.2 TABLET ORAL
Status: DISCONTINUED | OUTPATIENT
Start: 2024-03-24 | End: 2024-03-25 | Stop reason: HOSPADM

## 2024-03-24 RX ORDER — HALOPERIDOL 5 MG/ML
1 INJECTION INTRAMUSCULAR EVERY 6 HOURS PRN
Status: DISCONTINUED | OUTPATIENT
Start: 2024-03-24 | End: 2024-03-25 | Stop reason: HOSPADM

## 2024-03-24 RX ORDER — DEXTROSE MONOHYDRATE, SODIUM CHLORIDE, AND POTASSIUM CHLORIDE 50; 1.49; 4.5 G/1000ML; G/1000ML; G/1000ML
INJECTION, SOLUTION INTRAVENOUS CONTINUOUS
Status: DISPENSED | OUTPATIENT
Start: 2024-03-24 | End: 2024-03-24

## 2024-03-24 RX ORDER — CLONIDINE HYDROCHLORIDE 0.1 MG/1
0.1 TABLET ORAL
Status: DISCONTINUED | OUTPATIENT
Start: 2024-03-24 | End: 2024-03-25 | Stop reason: HOSPADM

## 2024-03-24 RX ORDER — DEXAMETHASONE SODIUM PHOSPHATE 4 MG/ML
4 INJECTION, SOLUTION INTRA-ARTICULAR; INTRALESIONAL; INTRAMUSCULAR; INTRAVENOUS; SOFT TISSUE
Status: DISCONTINUED | OUTPATIENT
Start: 2024-03-24 | End: 2024-03-25 | Stop reason: HOSPADM

## 2024-03-24 RX ORDER — SCOLOPAMINE TRANSDERMAL SYSTEM 1 MG/1
1 PATCH, EXTENDED RELEASE TRANSDERMAL
Status: DISCONTINUED | OUTPATIENT
Start: 2024-03-24 | End: 2024-03-25 | Stop reason: HOSPADM

## 2024-03-24 RX ORDER — MEPERIDINE HYDROCHLORIDE 25 MG/ML
12.5 INJECTION INTRAMUSCULAR; INTRAVENOUS; SUBCUTANEOUS
Status: DISCONTINUED | OUTPATIENT
Start: 2024-03-24 | End: 2024-03-24 | Stop reason: HOSPADM

## 2024-03-24 RX ORDER — LIDOCAINE HYDROCHLORIDE 20 MG/ML
INJECTION, SOLUTION EPIDURAL; INFILTRATION; INTRACAUDAL; PERINEURAL PRN
Status: DISCONTINUED | OUTPATIENT
Start: 2024-03-24 | End: 2024-03-24 | Stop reason: SURG

## 2024-03-24 RX ADMIN — DEXAMETHASONE SODIUM PHOSPHATE 8 MG: 4 INJECTION INTRA-ARTICULAR; INTRALESIONAL; INTRAMUSCULAR; INTRAVENOUS; SOFT TISSUE at 10:42

## 2024-03-24 RX ADMIN — EPHEDRINE SULFATE 5 MG: 50 INJECTION, SOLUTION INTRAVENOUS at 11:26

## 2024-03-24 RX ADMIN — GLYCOPYRROLATE 0.2 MG: 0.2 INJECTION INTRAMUSCULAR; INTRAVENOUS at 11:01

## 2024-03-24 RX ADMIN — PROTAMINE SULFATE 50 MG: 10 INJECTION, SOLUTION INTRAVENOUS at 11:50

## 2024-03-24 RX ADMIN — HYDRALAZINE HYDROCHLORIDE 10 MG: 20 INJECTION, SOLUTION INTRAMUSCULAR; INTRAVENOUS at 11:54

## 2024-03-24 RX ADMIN — EPHEDRINE SULFATE 5 MG: 50 INJECTION, SOLUTION INTRAVENOUS at 11:01

## 2024-03-24 RX ADMIN — CEFAZOLIN 2 G: 1 INJECTION, POWDER, FOR SOLUTION INTRAMUSCULAR; INTRAVENOUS at 10:32

## 2024-03-24 RX ADMIN — SODIUM CHLORIDE, POTASSIUM CHLORIDE, SODIUM LACTATE AND CALCIUM CHLORIDE: 600; 310; 30; 20 INJECTION, SOLUTION INTRAVENOUS at 05:05

## 2024-03-24 RX ADMIN — PROPOFOL 140 MG: 10 INJECTION, EMULSION INTRAVENOUS at 10:38

## 2024-03-24 RX ADMIN — LIDOCAINE HYDROCHLORIDE 4 ML: 40 SOLUTION TOPICAL at 10:38

## 2024-03-24 RX ADMIN — SUGAMMADEX 200 MG: 100 INJECTION, SOLUTION INTRAVENOUS at 12:00

## 2024-03-24 RX ADMIN — LEVOTHYROXINE SODIUM 50 MCG: 0.05 TABLET ORAL at 05:07

## 2024-03-24 RX ADMIN — POTASSIUM CHLORIDE, DEXTROSE MONOHYDRATE AND SODIUM CHLORIDE: 150; 5; 450 INJECTION, SOLUTION INTRAVENOUS at 16:32

## 2024-03-24 RX ADMIN — Medication 100 MCG: at 11:01

## 2024-03-24 RX ADMIN — METOPROLOL TARTRATE 50 MG: 50 TABLET, FILM COATED ORAL at 05:06

## 2024-03-24 RX ADMIN — EPHEDRINE SULFATE 5 MG: 50 INJECTION, SOLUTION INTRAVENOUS at 11:57

## 2024-03-24 RX ADMIN — Medication 100 MCG: at 11:58

## 2024-03-24 RX ADMIN — ONDANSETRON 4 MG: 2 INJECTION INTRAMUSCULAR; INTRAVENOUS at 12:00

## 2024-03-24 RX ADMIN — ROCURONIUM BROMIDE 80 MG: 50 INJECTION, SOLUTION INTRAVENOUS at 10:38

## 2024-03-24 RX ADMIN — LIDOCAINE HYDROCHLORIDE 5 MG: 20 INJECTION, SOLUTION EPIDURAL; INFILTRATION; INTRACAUDAL at 10:38

## 2024-03-24 RX ADMIN — EPHEDRINE SULFATE 5 MG: 50 INJECTION, SOLUTION INTRAVENOUS at 11:16

## 2024-03-24 RX ADMIN — LABETALOL HYDROCHLORIDE 10 MG: 5 INJECTION, SOLUTION INTRAVENOUS at 04:31

## 2024-03-24 RX ADMIN — PROPOFOL 60 MG: 10 INJECTION, EMULSION INTRAVENOUS at 11:52

## 2024-03-24 RX ADMIN — SODIUM CHLORIDE, SODIUM GLUCONATE, SODIUM ACETATE, POTASSIUM CHLORIDE AND MAGNESIUM CHLORIDE: 526; 502; 368; 37; 30 INJECTION, SOLUTION INTRAVENOUS at 12:01

## 2024-03-24 RX ADMIN — KETOROLAC TROMETHAMINE 15 MG: 15 INJECTION, SOLUTION INTRAMUSCULAR; INTRAVENOUS at 12:00

## 2024-03-24 RX ADMIN — ESMOLOL HYDROCHLORIDE 30 MG: 100 INJECTION, SOLUTION INTRAVENOUS at 11:52

## 2024-03-24 RX ADMIN — EPHEDRINE SULFATE 5 MG: 50 INJECTION, SOLUTION INTRAVENOUS at 11:08

## 2024-03-24 RX ADMIN — FENTANYL CITRATE 100 MCG: 50 INJECTION, SOLUTION INTRAMUSCULAR; INTRAVENOUS at 10:57

## 2024-03-24 RX ADMIN — ATORVASTATIN CALCIUM 80 MG: 40 TABLET, FILM COATED ORAL at 05:07

## 2024-03-24 RX ADMIN — HEPARIN SODIUM 7000 UNITS: 1000 INJECTION, SOLUTION INTRAVENOUS; SUBCUTANEOUS at 11:22

## 2024-03-24 RX ADMIN — EPHEDRINE SULFATE 5 MG: 50 INJECTION, SOLUTION INTRAVENOUS at 10:43

## 2024-03-24 ASSESSMENT — PAIN DESCRIPTION - PAIN TYPE
TYPE: SURGICAL PAIN
TYPE: ACUTE PAIN
TYPE: ACUTE PAIN
TYPE: SURGICAL PAIN
TYPE: SURGICAL PAIN
TYPE: ACUTE PAIN
TYPE: SURGICAL PAIN
TYPE: ACUTE PAIN

## 2024-03-24 ASSESSMENT — PAIN SCALES - GENERAL: PAIN_LEVEL: 1

## 2024-03-24 NOTE — ANESTHESIA TIME REPORT
Anesthesia Start and Stop Event Times       Date Time Event    3/24/2024 1020 Ready for Procedure     1027 Anesthesia Start     1216 Anesthesia Stop          Responsible Staff  03/24/24      Name Role Begin End    Jani Oviedo III, M.D. Anesth 1027 1216          Overtime Reason:  no overtime (within assigned shift)    Comments:

## 2024-03-24 NOTE — PROGRESS NOTES
Pt admitted to room  via transport in hospital bed from ED at 1700.     Patient reports pain at 0 on a scale of 0-10. Educated patient regarding pharmacologic and non pharmacologic modalities for pain management. Oriented to room call light and smoking policy.  Reviewed plan of care (equipment, incentive spirometer, sequential compression devices, medications, activity, diet, fall precautions, skin care, and pain) with patient and family. Welcome packet given and reviewed with patient, all questions answered. Education provided on oral hygiene program.     AA&Ox4. Denies CP/SOB.  See 2 RN skin note  Tolerating regular diet. Denies N/V. NPO orders to begin at midnight.   + void + BM. Last BM 3/23  Pt ambulates standby    All needs met at this time. Call light within reach. Pt calls appropriately. Bed low and locked, non skid socks in place. Hourly rounding in place.

## 2024-03-24 NOTE — PROGRESS NOTES
VIET Tarango called to inform of pt's hector site oozing. Dressing was removed with active sanguinous drainage coming from HECTOR site with bruising noted to above hector drain site. Dr. Tarango and VIET Tarango informed to replace dressing and to place a small sand bag onto pt's oozing site and to update for any further concerns.     Pt asymptomatic at this time, resting comfortably in hospital bed with no complaints.

## 2024-03-24 NOTE — PROGRESS NOTES
Bedside report received.  Assessment complete.  A&O x 4. Patient calls appropriately.  Patient ambulates with standby assist. Bed alarm off.   Patient has 0/10 pain. Pain managed with prescribed medications.  Denies N&V. Tolerating NPO diet.  + void, + flatus, - BM.  Patient denies SOB.  SCD's refused.  Review plan with of care with patient. Call light and personal belongings within reach. Hourly rounding in place. All needs met at this time.

## 2024-03-24 NOTE — ANESTHESIA PROCEDURE NOTES
Arterial Line    Performed by: Jani Oviedo III, M.D.  Authorized by: Jani Oviedo III, M.D.    Start Time:  3/24/2024 10:48 AM  End Time:  3/24/2024 10:52 AM  Localization: surface landmarks    Patient Location:  OR  Indication: continuous blood pressure monitoring        Catheter Size:  20 G  Seldinger Technique?: Yes    Laterality:  Left  Site:  Radial artery  Line Secured:  Antimicrobial disc, tape and transparent dressing  Events: patient tolerated procedure well with no complications     2nd attemot

## 2024-03-24 NOTE — OP REPORT
VASCULAR SURGERY SERVICE  Operative Note  _______________________________________________    Date: 3/24/2024    Patient: Marco Echeverria  : 1945  MRN: [id number]  _______________________________________________    Preoperative Diagnosis:  symptomatic left carotid stenosis    Postoperative Diagnosis:  Same    Procedure:  95561 left carotid endarterectomy with neuromonitoring  _______________________________________________    Surgeon:   Aly Tarango MD    Assistant:   Annelise LLANOS    Anesthesia:   GETA plus local anesthetic    EBL:    Less than 100 cc    Heparin:   Systemically heparinized    Complications:   None    Disposition:   PACU in stable condition    Findings:   Hemorrhagic ruptured plaque      Justification for use of Surgical First Assist:  An experienced first assistant was utilized during this operation due to the complexity of the operation.  My assistant participated with patient preparation for surgery, incision, surgical exposure including retraction, dissection, and ligation to isolate the target structures and preserve nearby structures, and closure of the field of dissection.  The presence of the expert assist increased the efficiency of the operation and decreased the risk of intraoperative surgical complications.    _______________________________________________    History:  Marco Echeverria is a 79 y.o. male with symptomatic left carotid stenosis.  Carotid endarterectomy was recommended.  I had a thorough, detailed discussion with the patient regarding the severity of his carotid disease and that he is at high risk of stroke.  I explained the primary purpose of this operation is to prevent stroke from the plaque in the carotid artery.  I explained the details of the operation including neuromonitoring and possible use of a shunt.  I discussed the alternatives of optimal medical management or stenting, although carotid endarterectomy is preferable in someone who is acceptable  risk for surgery.  I discussed the potential risks, including but not limited to bleeding, infection, injury to vessels or nerves, and risks of anesthesia. I explained the risk that the operation itself can cause a stroke, although the risk of stroke from the operation is less than the risk of stroke if the plaque is not treated, and thus carotid endarterectomy is recommended.  All of their questions were answered. Patient understands and agrees to proceed.    Procedure Summary:  Following informed consent, patient was brought to the OR where general anesthesia was administered. Patient was positioned, neuromonitoring was put in place, and the neck was prepped and draped in the usual sterile fashion.  Timeout was called to identify the correct patient, team and equipment.  Everyone was in agreement.      Procedure began with injection of local anesthetic along the SCM and then a longitudinal incision was made and carried down through each layer using cautery to assure hemostasis.  The common facial vein was identified and ligated.  The common, external and internal carotid were identified and dissected circumferentially and encircled with loops.      Patient was systemically heparinized and then the artery was clamped, with the ICA clamp being applied first.  The carotid was opened with a longitudinal arteriotomy and a Hemorrhagic ruptured plaque was seen.  There were no changes on EEG monitoring. Endarterectomy of the plaque was performed and the endpoints were tacked with interrupted prolene sutures.  Once complete, a bovine pericardial patch was sutured with a running 6-0 prolene suture.  The artery was flushed and copiously irrigated and the suture line was completed.  The clamps were removed, with the ICA being removed last, and flow was restored.  There was a continuous doppler flow signal in the ICA at this point with no evidence of stenosis.  The heparin was reversed with protamine. Topical hemostatic was  applied. A 7Fr drain was placed and exited the lower neck and was secured with a nylon suture and connected to bulb suction.    At this point we had good hemostasis.  The platysma was reapproximated with absorbable suture.  The skin was reapproximated with a running 4-0 stratafix subcuticular suture.  A sterile dressing was placed. Patient was extubated and sent to PACU in stable condition.  All counts were correct at the conclusion of the case.       _______________________________________________    Aly Tarango MD  [facility]

## 2024-03-24 NOTE — ASSESSMENT & PLAN NOTE
Hx of radation for head/neck cancer and question if contributed to hx of carotid stenosis.  Vascular Dr Tarango consulting and will take to OR 3/24  Statin and plavix  BP control

## 2024-03-24 NOTE — ANESTHESIA PREPROCEDURE EVALUATION
Case: [id number] Date/Time: 03/24/24 1015    Procedure: ENDARTERECTOMY, CAROTID (Left)    Location: [facility] OR 09 / SURGERY [facility]    Surgeons: Aly Tarango M.D.            Relevant Problems   NEURO   (positive) History of Acute ischemic stroke (HCC) 2/2 to bilateral ICA stenosis      CARDIAC   (positive) Carotid stenosis, symptomatic w/o infarct, left   (positive) HTN (hypertension)      ENDO   (positive) Hypothyroidism       Physical Exam    Airway   Mallampati: IV  TM distance: >3 FB  Neck ROM: limited       Cardiovascular - normal exam  Rhythm: regular  Rate: normal  (-) murmur     Dental - normal exam           Pulmonary - normal exam  Breath sounds clear to auscultation     Abdominal    Neurological - normal exam         Other findings: Difficult airway potential              Anesthesia Plan    ASA 3- EMERGENT       Plan - general       Airway plan will be ETT    (Arterial line, Glidescope in room)      Induction: intravenous    Postoperative Plan: Postoperative administration of opioids is intended.    Pertinent diagnostic labs and testing reviewed    Informed Consent:    Anesthetic plan and risks discussed with patient.    Use of blood products discussed with: patient whom consented to blood products.

## 2024-03-24 NOTE — ANESTHESIA POSTPROCEDURE EVALUATION
Patient: Marco Echeverria    Procedure Summary       Date: 03/24/24 Room / Location: [facility] 09 / SURGERY [facility]    Anesthesia Start: 1027 Anesthesia Stop: 1216    Procedure: ENDARTERECTOMY, CAROTID (Left: Neck) Diagnosis: (left carotid artery stenosis)    Surgeons: Aly Tarango M.D. Responsible Provider: Jani Oviedo III, M.D.    Anesthesia Type: general ASA Status: 3 - Emergent            Final Anesthesia Type: general  Last vitals  BP   Blood Pressure : 112/56, Arterial BP: 149/54    Temp   36.2 °C (97.2 °F)    Pulse   82   Resp   13    SpO2   95 %      Anesthesia Post Evaluation    Patient location during evaluation: PACU  Patient participation: complete - patient participated  Level of consciousness: awake and alert  Pain score: 1    Airway patency: patent  Anesthetic complications: no  Cardiovascular status: hemodynamically stable  Respiratory status: acceptable  Hydration status: euvolemic    PONV: none          No notable events documented.     Nurse Pain Score: 1 (NPRS)

## 2024-03-24 NOTE — CARE PLAN
The patient is Stable - Low risk of patient condition declining or worsening    Shift Goals: Neuro checks, safety  Clinical Goals: Neuro Checks, Safety  Patient Goals: Rest, Recover    Progress made toward(s) clinical / shift goals:  Pt resting comfortably in bed, pain well controlled with current medication.  This RN discussed plan of care and barriers to discharge with Pt, who verbalized understanding.

## 2024-03-24 NOTE — ANESTHESIA PROCEDURE NOTES
Airway    Date/Time: 3/24/2024 10:38 AM    Performed by: Jani Oviedo III, M.D.  Authorized by: Jani Oviedo III, M.D.    Location:  OR  Urgency:  Elective  Difficult Airway: No    Indications for Airway Management:  Anesthesia      Spontaneous Ventilation: absent    Sedation Level:  Deep  Preoxygenated: Yes    Patient Position:  Sniffing  Final Airway Type:  Endotracheal airway  Final Endotracheal Airway:  ETT  Cuffed: Yes    Technique Used for Successful ETT Placement:  Direct laryngoscopy    Insertion Site:  Oral  Blade Type:  Cuadra  Laryngoscope Blade/Videolaryngoscope Blade Size:  3  ETT Size (mm):  7.5  Measured from:  Lips  ETT to Lips (cm):  22  Placement Verified by: auscultation and capnometry    Cormack-Lehane Classification:  Grade IV - neither glottis nor epiglottis seen  Number of Attempts at Approach:  1

## 2024-03-24 NOTE — PROGRESS NOTES
Assumed care of patient at 1845. Bedside report received from Gisela YOUNGER. Assessment complete.  AA&Ox4. Denies CP/SOB.  Reporting 0/10 pain. Declined intervention at this time.  Educated patient regarding pharmacologic and non pharmacologic modalities for pain management.  Skin per flowsheets  Tolerating Regular diet. Denies N/V.  + void. Last BM PTA  Pt ambulates with SBA  All needs met at this time. Call light within reach. Pt calls appropriately. Bed low and locked, non skid socks in place. Hourly rounding in place.

## 2024-03-24 NOTE — OR NURSING
Pt is aaox4, resting comfortably in hospital bed on 3lpm nasal cannula. Respirations are equal and unlabored, he is in no acute distress. He does not complain of pain at this time. Swallow eval performed and pt passes without difficulty. He is given water per request and tolerates well without complaints of n/v. Surgical site is clean, dry and intact. HECTOR drain still slightly oozing with pressure applied to it per VIET Tarango. Pt remains asymptomatic, no hematoma noted upon assessments in PACU. Pt's neuro is intact during stay in PACU with no changes.     Handoff report given to Gisela YOUNGER and aware of pt pending arrival to her floor.

## 2024-03-25 ENCOUNTER — APPOINTMENT (OUTPATIENT)
Dept: RADIOLOGY | Facility: MEDICAL CENTER | Age: 79
End: 2024-03-25
Attending: HOSPITALIST
Payer: COMMERCIAL

## 2024-03-25 ENCOUNTER — PHARMACY VISIT (OUTPATIENT)
Dept: PHARMACY | Facility: MEDICAL CENTER | Age: 79
End: 2024-03-25
Payer: COMMERCIAL

## 2024-03-25 VITALS
RESPIRATION RATE: 18 BRPM | BODY MASS INDEX: 21.19 KG/M2 | HEIGHT: 74 IN | OXYGEN SATURATION: 95 % | SYSTOLIC BLOOD PRESSURE: 103 MMHG | TEMPERATURE: 97.9 F | WEIGHT: 165.12 LBS | HEART RATE: 77 BPM | DIASTOLIC BLOOD PRESSURE: 51 MMHG

## 2024-03-25 PROCEDURE — 99232 SBSQ HOSP IP/OBS MODERATE 35: CPT | Performed by: NURSE PRACTITIONER

## 2024-03-25 PROCEDURE — 700102 HCHG RX REV CODE 250 W/ 637 OVERRIDE(OP): Performed by: HOSPITALIST

## 2024-03-25 PROCEDURE — 99239 HOSP IP/OBS DSCHRG MGMT >30: CPT | Performed by: INTERNAL MEDICINE

## 2024-03-25 PROCEDURE — 97602 WOUND(S) CARE NON-SELECTIVE: CPT

## 2024-03-25 PROCEDURE — 70551 MRI BRAIN STEM W/O DYE: CPT

## 2024-03-25 PROCEDURE — 700102 HCHG RX REV CODE 250 W/ 637 OVERRIDE(OP): Performed by: INTERNAL MEDICINE

## 2024-03-25 PROCEDURE — A9270 NON-COVERED ITEM OR SERVICE: HCPCS | Performed by: HOSPITALIST

## 2024-03-25 PROCEDURE — A9270 NON-COVERED ITEM OR SERVICE: HCPCS | Performed by: INTERNAL MEDICINE

## 2024-03-25 PROCEDURE — RXMED WILLOW AMBULATORY MEDICATION CHARGE: Performed by: INTERNAL MEDICINE

## 2024-03-25 RX ORDER — CLOPIDOGREL BISULFATE 75 MG/1
75 TABLET ORAL DAILY
Status: DISCONTINUED | OUTPATIENT
Start: 2024-03-25 | End: 2024-03-25 | Stop reason: HOSPADM

## 2024-03-25 RX ORDER — ASPIRIN 81 MG/1
81 TABLET, CHEWABLE ORAL DAILY
Qty: 100 TABLET | Refills: 0 | Status: SHIPPED | OUTPATIENT
Start: 2024-03-26 | End: 2024-07-04

## 2024-03-25 RX ORDER — ASPIRIN 81 MG/1
81 TABLET, CHEWABLE ORAL DAILY
Status: DISCONTINUED | OUTPATIENT
Start: 2024-03-25 | End: 2024-03-25 | Stop reason: HOSPADM

## 2024-03-25 RX ORDER — ATORVASTATIN CALCIUM 80 MG/1
80 TABLET, FILM COATED ORAL DAILY
Qty: 30 TABLET | Refills: 0 | Status: SHIPPED | OUTPATIENT
Start: 2024-03-25

## 2024-03-25 RX ADMIN — LEVOTHYROXINE SODIUM 50 MCG: 0.05 TABLET ORAL at 04:36

## 2024-03-25 RX ADMIN — ATORVASTATIN CALCIUM 80 MG: 40 TABLET, FILM COATED ORAL at 04:36

## 2024-03-25 RX ADMIN — CLOPIDOGREL BISULFATE 75 MG: 75 TABLET ORAL at 09:06

## 2024-03-25 RX ADMIN — METOPROLOL TARTRATE 50 MG: 50 TABLET, FILM COATED ORAL at 04:36

## 2024-03-25 RX ADMIN — ASPIRIN 81 MG 81 MG: 81 TABLET ORAL at 09:06

## 2024-03-25 ASSESSMENT — PAIN DESCRIPTION - PAIN TYPE
TYPE: ACUTE PAIN
TYPE: ACUTE PAIN

## 2024-03-25 NOTE — WOUND TEAM
[facility]  Inpatient Services  Initial Wound and Skin Care Evaluation    Admission Date: 3/23/2024     Last order of IP CONSULT TO WOUND CARE was found on 3/24/2024 from Hospital Encounter on 3/23/2024     HPI, PMH, SH: Reviewed    Past Surgical History:   Procedure Laterality Date    ND THROMBOENDARTECTMY NECK,NECK INCIS Left 3/24/2024    Procedure: ENDARTERECTOMY, CAROTID;  Surgeon: Ofelia Tarango M.D.;  Location: SURGERY [facility];  Service: General    ND THROMBOENDARTECTMY NECK,NECK INCIS Right 7/13/2020    Procedure: ENDARTERECTOMY, CAROTID;  Surgeon: Ofelia Tarango M.D.;  Location: SURGERY [facility];  Service: General    LARYNGOSCOPY  8/23/2011    Performed by OFELIA PORTER at SURGERY SAME DAY [facility]    TUMOR EXCISION WITH BIOPSY  8/23/2011    Performed by OFELIA PORTER at SURGERY SAME DAY [facility]    SEPTOPLASTY  7/15/2010    Performed by OFELIA PORTER at SURGERY SAME DAY [facility]    ANTROSTOMY  7/15/2010    Performed by OFELIA PORTER at SURGERY SAME DAY [facility]    ETHMOIDECTOMY  7/15/2010    Performed by OFELIA PORTER at SURGERY SAME DAY [facility]    CATARACT EXTRACTION WITH IOL      herminio eyes    OTHER  1990s    benign tumor right face    TONSILLECTOMY      VASECTOMY       Social History     Tobacco Use    Smoking status: Former    Smokeless tobacco: Former     Quit date: 5/1/1975    Tobacco comments:     1-2 pk a day for 15 yrs   Substance Use Topics    Alcohol use: Yes     Comment: 1 a month     Chief Complaint   Patient presents with    Hypertension     217/120    Possible Stroke     Last known well 1200 today     Diagnosis: Carotid stenosis, symptomatic w/o infarct, left [I65.22]    Unit where seen by Wound Team: T407/02     WOUND CONSULT RELATED TO:  Left AC    WOUND TEAM PLAN OF CARE - Frequency of Follow-up:   Nursing to follow dressing orders written for wound care. Contact wound team if area fails to progress, deteriorates or with any  questions/concerns if something comes up before next scheduled follow up (See below as to whether wound is following and frequency of wound follow up)   Not following, consult as needed  - any area    WOUND HISTORY:   Pt is a 79yr old male who was admitted with left carotid stenosis and underwent endarterectomy with Dr. Tarango. Pt had infiltrated IV to the left AC which led to a skin tear to the arm. Wound team was asked to evaluate and make recommendations.        WOUND ASSESSMENT/LDA                      Vascular:    REGLA:   No results found.    Lab Values:    Lab Results   Component Value Date/Time    WBC 3.9 (L) 03/24/2024 03:12 AM    RBC 4.77 03/24/2024 03:12 AM    HEMOGLOBIN 15.4 03/24/2024 03:12 AM    HEMATOCRIT 43.7 03/24/2024 03:12 AM    HBA1C 5.8 (H) 07/13/2020 12:46 AM         Culture Results show:  No results found for this or any previous visit (from the past 720 hour(s)).    Pain Level/Medicated:  None, Tolerated without pain medication       INTERVENTIONS BY WOUND TEAM:  Chart and images reviewed. Discussed with bedside RN. All areas of concern (based on picture review, LDA review and discussion with bedside RN) have been thoroughly assessed. Documentation of areas based on significant findings. This RN in to assess patient. Performed standard wound care which includes appropriate positioning, dressing removal and non-selective debridement. Pictures and measurements obtained weekly if/when required.    Wound:  Left Forearm skin tear  Primary Dressing:  Mepitel One     Area Assessed:  Sacrococcygeal area  Intact left open to air     Area Assessed:  Bilateral Heels  Heels intact    Advanced Wound Care Discharge Planning  Number of Clinicians necessary to complete wound care: 1  Is patient requiring IV pain medications for dressing changes:  No   Length of time for dressing change 30 min. (This does not include chart review, pre-medication time, set up, clean up or time spent  charting.)    Interdisciplinary consultation: Patient, Bedside RN (Kat), N/A.  Pressure injury and staging reviewed with N/A.    EVALUATION / RATIONALE FOR TREATMENT:     Date:  03/25/24  Wound Status:  Initial evaluation    No advanced wound care needs identified. Preventative measures.         Goals: Steady decrease in wound area and depth weekly.    NURSING PLAN OF CARE ORDERS:  No new orders this visit    NUTRITION RECOMMENDATIONS   Wound Team Recommendations:  N/A    DIET ORDERS (From admission to next 24h)       Start     Ordered    03/24/24 1611  Diet Order Diet: Regular  ALL MEALS        Question:  Diet:  Answer:  Regular    03/24/24 1610                  Anticipated discharge plans:  Self/Family Care        Vac Discharge Needs:  Vac Discharge plan is purely a recommendation from wound team and not a requirement for discharge unless otherwise stated by physician.  Not Applicable Pt not on a wound vac

## 2024-03-25 NOTE — CARE PLAN
The patient is Stable - Low risk of patient condition declining or worsening    Shift Goals: Neuro, Incisions, Safety  Clinical Goals: Neuro Checks, Safety  Patient Goals: Rest, Discharge    Progress made toward(s) clinical / shift goals:  This RN discussed plan of care and barriers to discharge.  Pt demonstrating intact neuro function throughout shift.  Incisions clean, dry, intact throughout shift after dressing change at start of shift.

## 2024-03-25 NOTE — CARE PLAN
The patient is Stable - Low risk of patient condition declining or worsening    Shift Goals  Clinical Goals: monitor neuro status, prep for OR  Patient Goals: rest    Progress made toward(s) clinical / shift goals:      Patient's neuro status will be maintained with Q4 neuro assessments and surgical intervention.    Problem: Neuro Status  Goal: Neuro status will remain stable or improve  Outcome: Progressing

## 2024-03-25 NOTE — PROGRESS NOTES
Neurology Progress Note  Vascular Neurology Service, [facility]    Referring Physician: Palmer Sepulveda M.D.    Chief Complaint   Patient presents with    Hypertension     217/120    Possible Stroke     Last known well 1200 today       HPI: Refer to initial documented Neurology H&P, as detailed in the patient's chart.    Interval History 03/25/2024: No acute events overnight.Patient underwent CEA of moderately stenotic left ICA with vascular surgery yesterday. Nonfocal neuro exam. MRI brain completed overnight and is negative for acute infarcts.    Review of systems: In addition to what is detailed in the HPI and/or updated in the interval history, all other systems reviewed and are negative.    Past Medical History:    has a past medical history of CATARACT, History of malignant neoplasm of tonsil (2011), History of tongue cancer (2011), Late effect of radiation, and Radiation caries.    FHx:  family history includes Hypertension in his mother.    SHx:   reports that he has quit smoking. He quit smokeless tobacco use about 48 years ago. He reports current alcohol use. He reports that he does not use drugs.    Medications:    Current Facility-Administered Medications:     aspirin (Asa) chewable tab 81 mg, 81 mg, Oral, DAILY, Audelia Moseley M.D.    clopidogrel (Plavix) tablet 75 mg, 75 mg, Oral, DAILY, Audelia Moseley M.D.    Pharmacy Consult Request ...Pain Management Review 1 Each, 1 Each, Other, PHARMACY TO DOSE, ANTHONY Durand.P.R.N.    ondansetron (Zofran) syringe/vial injection 4 mg, 4 mg, Intravenous, Q4HRS PRN, ANTHONY Durand.P.R.N.    dexamethasone (Decadron) injection 4 mg, 4 mg, Intravenous, Once PRN, ANTHONY Durand.P.R.N.    diphenhydrAMINE (Benadryl) injection 25 mg, 25 mg, Intravenous, Q6HRS PRN, ANTHONY Durand.P.R.N.    haloperidol lactate (Haldol) injection 1 mg, 1 mg, Intravenous, Q6HRS PRN, ANTHONY Durand.P.R.N.    scopolamine (Transderm-Scop) patch 1 Patch, 1 Patch,  Transdermal, Q72HRS PRN, REBECA DurandPEricRMELISSA.    oxyCODONE immediate-release (Roxicodone) tablet 5 mg, 5 mg, Oral, Q3HRS PRN **OR** oxyCODONE immediate release (Roxicodone) tablet 10 mg, 10 mg, Oral, Q3HRS PRN **OR** HYDROmorphone (Dilaudid) injection 0.5 mg, 0.5 mg, Intravenous, Q3HRS PRN, REBECA DurandPEricRKAMLESH    cloNIDine (Catapres) tablet 0.2 mg, 0.2 mg, Oral, Once PRN **AND** cloNIDine (Catapres) tablet 0.1 mg, 0.1 mg, Oral, Q HOUR PRN, REBECA DurandPEricRKAMLESH    labetalol (Normodyne/Trandate) injection 10 mg, 10 mg, Intravenous, Q4HRS PRN, REBECA DurandPEricRMELISSA.    atorvastatin (Lipitor) tablet 80 mg, 80 mg, Oral, DAILY, Steven Vega D.O., 80 mg at 03/25/24 0436    levothyroxine (Synthroid) tablet 50 mcg, 50 mcg, Oral, AM ES, Steven Vega D.O., 50 mcg at 03/25/24 0436    acetaminophen (Tylenol) tablet 650 mg, 650 mg, Oral, Q6HRS PRN, Steven Vega D.O.    ondansetron (Zofran ODT) dispertab 4 mg, 4 mg, Oral, Q4HRS PRN, Steven Vega D.O.    metoprolol tartrate (Lopressor) tablet 50 mg, 50 mg, Oral, BID, Steven Vega D.O., 50 mg at 03/25/24 0436    Physical Examination:     Vitals:    03/24/24 2008 03/25/24 0000 03/25/24 0412 03/25/24 0436   BP: 137/74 120/75 (!) 141/82 128/74   Pulse: 85 77 74 68   Resp: 18 18 18    Temp: 36.9 °C (98.4 °F) 36.5 °C (97.7 °F) 36.1 °C (97 °F)    TempSrc: Temporal Temporal Temporal    SpO2: 93% 91% 94%    Weight:       Height:           General: Patient is awake and in no acute distress  Eyes: examination of optic disks not indicated at this time  CV: RRR    NEUROLOGICAL EXAM:     Mental status: Awake, alert and fully oriented, follows commands  Speech and language: speech is not dysarthric. The patient is able to name and repeat.  Cranial nerve exam: Pupils are equal, round and reactive to light bilaterally. Visual fields are full. Extraocular muscles are intact. Sensation in the face is intact to light touch. Face is symmetric. Hearing to finger rub  equal. Palate elevates symmetrically. Shoulder shrug is full. Tongue is midline.  Motor exam: Strength is 5/5 in all extremities both distally and proximally. Tone is normal. No abnormal movements were seen on exam.  Sensory exam: No sensory deficits identified   Deep tendon reflexes: 2+ and symmetric. Toes down-going bilaterally.  Coordination: no ataxia   Gait: deferred given patient preference    Objective Data:    Labs:  Lab Results   Component Value Date/Time    PROTHROMBTM 13.1 03/23/2024 01:29 PM    INR 0.98 03/23/2024 01:29 PM      Lab Results   Component Value Date/Time    WBC 3.9 (L) 03/24/2024 03:12 AM    RBC 4.77 03/24/2024 03:12 AM    HEMOGLOBIN 15.4 03/24/2024 03:12 AM    HEMATOCRIT 43.7 03/24/2024 03:12 AM    MCV 91.6 03/24/2024 03:12 AM    MCH 32.3 03/24/2024 03:12 AM    MCHC 35.2 03/24/2024 03:12 AM    MPV 9.4 03/24/2024 03:12 AM    NEUTSPOLYS 69.70 03/23/2024 01:29 PM    LYMPHOCYTES 14.70 (L) 03/23/2024 01:29 PM    MONOCYTES 12.90 03/23/2024 01:29 PM    EOSINOPHILS 1.80 03/23/2024 01:29 PM    BASOPHILS 0.70 03/23/2024 01:29 PM    HYPOCHROMIA 1+ 07/12/2010 12:00 PM    ANISOCYTOSIS 1+ 07/12/2010 12:00 PM      Lab Results   Component Value Date/Time    SODIUM 130 (L) 03/24/2024 03:12 AM    POTASSIUM 4.4 03/24/2024 03:12 AM    CHLORIDE 96 03/24/2024 03:12 AM    CO2 22 03/24/2024 03:12 AM    GLUCOSE 90 03/24/2024 03:12 AM    BUN 10 03/24/2024 03:12 AM    CREATININE 0.62 03/24/2024 03:12 AM      Lab Results   Component Value Date/Time    CHOLSTRLTOT 136 03/24/2024 03:12 AM    LDL 57 03/24/2024 03:12 AM    HDL 68 03/24/2024 03:12 AM    TRIGLYCERIDE 57 03/24/2024 03:12 AM       Lab Results   Component Value Date/Time    ALKPHOSPHAT 81 03/23/2024 01:29 PM    ASTSGOT 28 03/23/2024 01:29 PM    ALTSGPT 23 03/23/2024 01:29 PM    TBILIRUBIN 0.8 03/23/2024 01:29 PM        Imaging/Testing:    I interpreted and/or reviewed the patient's neuroimaging    DX-CHEST-PORTABLE (1 VIEW)   Final Result         1. No acute  cardiopulmonary abnormalities are identified.      CT-CTA NECK WITH & W/O-POST PROCESSING   Final Result      1. Moderate stenosis at the origin of the left internal carotid artery, worse than 2020.      CT-CTA HEAD WITH & W/O-POST PROCESS   Final Result         1. No hemodynamically significant narrowing of the major intracranial vessels.      CT-CEREBRAL PERFUSION ANALYSIS   Final Result      1.  Cerebral blood flow less than 30% likely representing completed infarct = 0 mL.      2.  T Max more than 6 seconds likely representing combination of completed infarct and ischemia = 0 mL.      3.  Mismatched volume likely representing ischemic brain/penumbra = None      4.  Please note that the cerebral perfusion was performed on the limited brain tissue around the basal ganglia region. Infarct/ischemia outside the CT perfusion sections can be missed in this study.      MR-BRAIN-W/O    (Results Pending)       Assessment and Plan:    79-year-old male presenting with acute onset word finding difficulties that lasted approximately 10 minutes upon waking up from a nap around noon today.  Symptoms have completely resolved at time of exam and patient has nonfocal.  Noncontrast CT head was negative for acute intracranial abnormalities.  CTA head/neck were negative for LVO, and dissection but reveals moderate stenosis of the left ICA just distal from the bifurcation.  Perfusion study was negative for CBF defect.  Patient underwent CEA with vascular surgery for a moderately stenosed left ICA. Post operatively patient remains with nonfocal neuro exam. MRI brain completed and is negative for acute infarcts.      Problem list:  TIA  Moderate Stenosis of Left ICA s/p CEA     Recommendations:  -q4h and PRN neuro assessment. VS per nursing/unit protocol.   -BP goal is normotension, 100-130/60-80. Antihypertensives per primary team.    -Telemetry  -Stroke Labs: LDL 57 and HgbA1c 5.8  -Continue DAPT with ASA 81mg daily and Plavix 75mg  daily x21 days. Monotherapy with ASA 81mg daily thereafter  -Atorvastatin 80 mg PO q HS. Titrate to long term LDL goal < 70  -BG management per primary team. BG goal . A1C goal <7  -PT/OT/SLP eval and treat   -DVT PPX: SCDs. Chemoppx okay from neurology perspective once cleared by vascular surgery    Case reviewed and plan created with Dr. Charlie Peterson, Vascular Neurology. Neurology will sign off at this time. Please call with any questions.      DAWOOD Bonds.  Neurology, Acute Care Services

## 2024-03-25 NOTE — PROGRESS NOTES
Vascular surgery    Postop day 1 status post left carotid endarterectomy for symptomatic carotid stenosis  Patient had a ruptured atheromatous plaque in the carotid artery which was likely responsible for his TIA symptom of expressive aphasia.    Patient is neurologically back to baseline    Incision clean dry and intact    Will discontinue HECTOR drain and patient can be discharged home today  Patient does not warrant ongoing Plavix therapy and can be discontinued    Follow-up 2 weeks my office for postoperative check and enter into surveillance program.    Aly Tarango MD

## 2024-03-26 NOTE — DISCHARGE SUMMARY
Discharge Summary    CHIEF COMPLAINT ON ADMISSION  Chief Complaint   Patient presents with    Hypertension     217/120    Possible Stroke     Last known well 1200 today       Reason for Admission  stroke symptoms     Admission Date  3/23/2024    CODE STATUS  Prior    HPI & HOSPITAL COURSE  78 y/o M with pmh of HTN, hypothyroidism, prior CVA 4 years ago came with slurry speech which did resolved for 15 mins. Perfusion CT scan was negative. He was found on CTA neck moderate stenosis.he was taken to OR for left carotid endarterectomy 3/24/2024. He did well post-op. Drain removed. Pt will follow up with Dr. Tarango. Discussed with neurology. Pt needs to stay only on asa. He will follow up with Dr. Tarango in two weeks.      Therefore, he is discharged in good and stable condition to home with close outpatient follow-up.    The patient met 2-midnight criteria for an inpatient stay at the time of discharge.    Discharge Date  3/25/2024    FOLLOW UP ITEMS POST DISCHARGE  Follow up with Dr. Tarango    DISCHARGE DIAGNOSES  Principal Problem:    Carotid stenosis, symptomatic w/o infarct, left (POA: Yes)  Active Problems:    History of malignant neoplasm of tonsil (POA: Yes)    History of tongue cancer (POA: Yes)      Overview: s/p Resection, chemoradiation    History of Acute ischemic stroke (HCC) 2/2 to bilateral ICA stenosis (Chronic) (POA: Yes)    Hyperlipidemia (POA: Yes)    Hypothyroidism (POA: Yes)  Resolved Problems:    * No resolved hospital problems. *      FOLLOW UP  No future appointments.  Alex Bunch M.D.  [address]  [facility] [place] [facility] [id number] [phone]    Schedule an appointment as soon as possible for a visit  Hospital follow-up    Aly Tarango M.D.  [address] [address]  [facility] [place] [facility] [id number] [phone]    Follow up        MEDICATIONS ON DISCHARGE     Medication List        START taking these medications        Instructions   Aspirin Low Dose 81 MG Chew chewable tablet  Start  taking on: March 26, 2024  Generic drug: aspirin   Chew 1 Tablet every day for 100 days.  Dose: 81 mg            CONTINUE taking these medications        Instructions   atorvastatin 80 MG tablet  Commonly known as: Lipitor   Take 1 Tablet by mouth every day.  Dose: 80 mg     levothyroxine 50 MCG Tabs  Commonly known as: Synthroid   Take 50 mcg by mouth every morning on an empty stomach.  Dose: 50 mcg     nebivolol 10 MG Tabs  Commonly known as: Bystolic   Take 10 mg by mouth every evening.  Dose: 10 mg              Allergies  No Known Allergies    DIET  No orders of the defined types were placed in this encounter.      ACTIVITY  As tolerated.  Weight bearing as tolerated    CONSULTATIONS  Neurology  Vascular surgery     PROCEDURES   left carotid endarterectomy     LABORATORY  Lab Results   Component Value Date    SODIUM 130 (L) 03/24/2024    POTASSIUM 4.4 03/24/2024    CHLORIDE 96 03/24/2024    CO2 22 03/24/2024    GLUCOSE 90 03/24/2024    BUN 10 03/24/2024    CREATININE 0.62 03/24/2024        Lab Results   Component Value Date    WBC 3.9 (L) 03/24/2024    HEMOGLOBIN 15.4 03/24/2024    HEMATOCRIT 43.7 03/24/2024    PLATELETCT 155 (L) 03/24/2024        Total time of the discharge process exceeds 33 minutes.

## 2024-03-26 NOTE — PROGRESS NOTES
Patient was seen at bedside and chart was reviewed. Please see Dr. Vega's note for further details.  Pt taken to OR for left CEA. He is not symptomatic. Post op care    Palmer Sepulveda M.D.

## 2024-04-18 ENCOUNTER — OFFICE VISIT (OUTPATIENT)
Dept: VASCULAR SURGERY | Facility: MEDICAL CENTER | Age: 79
End: 2024-04-18
Payer: COMMERCIAL

## 2024-04-18 VITALS
HEIGHT: 74 IN | DIASTOLIC BLOOD PRESSURE: 82 MMHG | HEART RATE: 64 BPM | WEIGHT: 165 LBS | BODY MASS INDEX: 21.17 KG/M2 | SYSTOLIC BLOOD PRESSURE: 144 MMHG | TEMPERATURE: 97.4 F | OXYGEN SATURATION: 98 %

## 2024-04-18 DIAGNOSIS — I65.22 CAROTID ARTERY STENOSIS, ASYMPTOMATIC, LEFT: ICD-10-CM

## 2024-04-18 PROCEDURE — 3077F SYST BP >= 140 MM HG: CPT | Performed by: SURGERY

## 2024-04-18 PROCEDURE — 99024 POSTOP FOLLOW-UP VISIT: CPT | Performed by: SURGERY

## 2024-04-18 PROCEDURE — 3079F DIAST BP 80-89 MM HG: CPT | Performed by: SURGERY

## 2024-04-18 RX ORDER — CLOBETASOL PROPIONATE 0.5 MG/G
OINTMENT TOPICAL 2 TIMES DAILY
COMMUNITY

## 2024-04-18 ASSESSMENT — FIBROSIS 4 INDEX: FIB4 SCORE: 2.94

## 2024-04-18 NOTE — PROGRESS NOTES
"                 VASCULAR SURGERY                    Postop Note  _________________________________    4/18/2024    This patient returns to my office after undergoing left carotid endarterectomy for symptomatic left carotid stenosis.  As you recall the patient had an episode of expressive aphasia was diagnosed with a moderate plaque in the left carotid artery.    Patient was taken for endarterectomy and at that time a ruptured hemorrhagic plaque was identified.  This was definitely the source of his TIA symptom.    Patient has been doing well since surgery his incision is healing nicely.  He remains neurologically intact.    Returns for       Vitals  BP (!) 144/82 (BP Location: Left arm, Patient Position: Sitting, BP Cuff Size: Adult)   Pulse 64   Temp 36.3 °C (97.4 °F) (Temporal)   Ht 1.88 m (6' 2\")   Wt 74.8 kg (165 lb)   SpO2 98%   BMI 21.18 kg/m²     Exam  Incision healing well  Neurologically intact      A/P)  Carotid artery stenosis status post left carotid endarterectomy for symptomatic disease  Mild contralateral disease history of neck radiation    Placement will be placed in a surveillance program and if surveillance carotid duplex will be performed in 6 months and he will follow-up with me at that time.          Aly Tarango MD  [facility]  [phone] [address], [place] [address]  "

## 2024-06-18 ENCOUNTER — APPOINTMENT (RX ONLY)
Dept: URBAN - METROPOLITAN AREA CLINIC 4 | Facility: CLINIC | Age: 79
Setting detail: DERMATOLOGY
End: 2024-06-18

## 2024-06-18 DIAGNOSIS — L82.1 OTHER SEBORRHEIC KERATOSIS: ICD-10-CM

## 2024-06-18 DIAGNOSIS — Z85.828 PERSONAL HISTORY OF OTHER MALIGNANT NEOPLASM OF SKIN: ICD-10-CM

## 2024-06-18 DIAGNOSIS — L81.4 OTHER MELANIN HYPERPIGMENTATION: ICD-10-CM

## 2024-06-18 DIAGNOSIS — L84 CORNS AND CALLOSITIES: ICD-10-CM

## 2024-06-18 DIAGNOSIS — K40.90 UNILATERAL INGUINAL HERNIA, WITHOUT OBSTRUCTION OR GANGRENE, NOT SPECIFIED AS RECURRENT: ICD-10-CM

## 2024-06-18 DIAGNOSIS — Z85.820 PERSONAL HISTORY OF MALIGNANT MELANOMA OF SKIN: ICD-10-CM

## 2024-06-18 DIAGNOSIS — D22 MELANOCYTIC NEVI: ICD-10-CM

## 2024-06-18 DIAGNOSIS — D18.0 HEMANGIOMA: ICD-10-CM

## 2024-06-18 PROBLEM — D22.5 MELANOCYTIC NEVI OF TRUNK: Status: ACTIVE | Noted: 2024-06-18

## 2024-06-18 PROBLEM — D18.01 HEMANGIOMA OF SKIN AND SUBCUTANEOUS TISSUE: Status: ACTIVE | Noted: 2024-06-18

## 2024-06-18 PROCEDURE — ? DIAGNOSIS COMMENT

## 2024-06-18 PROCEDURE — 99213 OFFICE O/P EST LOW 20 MIN: CPT

## 2024-06-18 PROCEDURE — ? COUNSELING

## 2024-06-18 PROCEDURE — ? ADDITIONAL NOTES

## 2024-06-18 ASSESSMENT — LOCATION SIMPLE DESCRIPTION DERM
LOCATION SIMPLE: RIGHT HAND
LOCATION SIMPLE: RIGHT CHEEK
LOCATION SIMPLE: NECK
LOCATION SIMPLE: ABDOMEN
LOCATION SIMPLE: RIGHT UPPER BACK
LOCATION SIMPLE: RIGHT LOWER EXTREMITY
LOCATION SIMPLE: GROIN
LOCATION SIMPLE: PLANTAR SURFACE OF LEFT 1ST TOE
LOCATION SIMPLE: LEFT HAND
LOCATION SIMPLE: BACK
LOCATION SIMPLE: LEFT LOWER EXTREMITY

## 2024-06-18 ASSESSMENT — LOCATION DETAILED DESCRIPTION DERM
LOCATION DETAILED: RIGHT INGUINAL CREASE
LOCATION DETAILED: LEFT DORSAL HAND
LOCATION DETAILED: RIGHT CHEEK
LOCATION DETAILED: LEFT UPPER BACK
LOCATION DETAILED: RIGHT MEDIAL UPPER BACK
LOCATION DETAILED: LEFT ANTERIOR THIGH
LOCATION DETAILED: LEFT SUPERIOR LATERAL NECK
LOCATION DETAILED: RIGHT ANTERIOR THIGH
LOCATION DETAILED: EPIGASTRIC SKIN
LOCATION DETAILED: LEFT MEDIAL PLANTAR 1ST TOE
LOCATION DETAILED: RIGHT DORSAL HAND

## 2024-06-18 ASSESSMENT — LOCATION ZONE DERM
LOCATION ZONE: NECK
LOCATION ZONE: HAND
LOCATION ZONE: TOE
LOCATION ZONE: LEG
LOCATION ZONE: TRUNK
LOCATION ZONE: FACE

## 2024-09-18 ENCOUNTER — APPOINTMENT (RX ONLY)
Dept: URBAN - METROPOLITAN AREA CLINIC 4 | Facility: CLINIC | Age: 79
Setting detail: DERMATOLOGY
End: 2024-09-18

## 2024-09-18 DIAGNOSIS — L82.0 INFLAMED SEBORRHEIC KERATOSIS: ICD-10-CM

## 2024-09-18 DIAGNOSIS — L30.9 DERMATITIS, UNSPECIFIED: ICD-10-CM

## 2024-09-18 DIAGNOSIS — L81.4 OTHER MELANIN HYPERPIGMENTATION: ICD-10-CM

## 2024-09-18 DIAGNOSIS — D22 MELANOCYTIC NEVI: ICD-10-CM

## 2024-09-18 DIAGNOSIS — Z85.820 PERSONAL HISTORY OF MALIGNANT MELANOMA OF SKIN: ICD-10-CM

## 2024-09-18 DIAGNOSIS — L57.0 ACTINIC KERATOSIS: ICD-10-CM

## 2024-09-18 DIAGNOSIS — D18.0 HEMANGIOMA: ICD-10-CM

## 2024-09-18 DIAGNOSIS — L82.1 OTHER SEBORRHEIC KERATOSIS: ICD-10-CM

## 2024-09-18 DIAGNOSIS — K40.90 UNILATERAL INGUINAL HERNIA, WITHOUT OBSTRUCTION OR GANGRENE, NOT SPECIFIED AS RECURRENT: ICD-10-CM

## 2024-09-18 DIAGNOSIS — Z85.828 PERSONAL HISTORY OF OTHER MALIGNANT NEOPLASM OF SKIN: ICD-10-CM

## 2024-09-18 PROBLEM — D48.5 NEOPLASM OF UNCERTAIN BEHAVIOR OF SKIN: Status: ACTIVE | Noted: 2024-09-18

## 2024-09-18 PROBLEM — D22.5 MELANOCYTIC NEVI OF TRUNK: Status: ACTIVE | Noted: 2024-09-18

## 2024-09-18 PROBLEM — D18.01 HEMANGIOMA OF SKIN AND SUBCUTANEOUS TISSUE: Status: ACTIVE | Noted: 2024-09-18

## 2024-09-18 PROCEDURE — ? DIAGNOSIS COMMENT

## 2024-09-18 PROCEDURE — 11102 TANGNTL BX SKIN SINGLE LES: CPT | Mod: 59

## 2024-09-18 PROCEDURE — ? COUNSELING

## 2024-09-18 PROCEDURE — ? ADDITIONAL NOTES

## 2024-09-18 PROCEDURE — ? LIQUID NITROGEN

## 2024-09-18 PROCEDURE — 99213 OFFICE O/P EST LOW 20 MIN: CPT | Mod: 25

## 2024-09-18 PROCEDURE — ? PRESCRIPTION

## 2024-09-18 PROCEDURE — 17000 DESTRUCT PREMALG LESION: CPT | Mod: 59

## 2024-09-18 PROCEDURE — ? BIOPSY BY SHAVE METHOD

## 2024-09-18 PROCEDURE — 17110 DESTRUCTION B9 LES UP TO 14: CPT

## 2024-09-18 RX ORDER — CLOBETASOL PROPIONATE 0.5 MG/G
1 OINTMENT TOPICAL BID
Qty: 60 | Refills: 3 | Status: ERX

## 2024-09-18 ASSESSMENT — LOCATION SIMPLE DESCRIPTION DERM
LOCATION SIMPLE: LEFT PRETIBIAL REGION
LOCATION SIMPLE: GROIN
LOCATION SIMPLE: RIGHT HAND
LOCATION SIMPLE: ABDOMEN
LOCATION SIMPLE: RIGHT CHEEK
LOCATION SIMPLE: BACK
LOCATION SIMPLE: NECK
LOCATION SIMPLE: RIGHT UPPER BACK
LOCATION SIMPLE: LEFT LIP
LOCATION SIMPLE: LEFT HAND
LOCATION SIMPLE: LEFT LOWER EXTREMITY
LOCATION SIMPLE: RIGHT LOWER EXTREMITY

## 2024-09-18 ASSESSMENT — LOCATION DETAILED DESCRIPTION DERM
LOCATION DETAILED: LEFT ANTERIOR THIGH
LOCATION DETAILED: RIGHT DORSAL HAND
LOCATION DETAILED: LEFT SUPERIOR VERMILION LIP
LOCATION DETAILED: LEFT UPPER BACK
LOCATION DETAILED: RIGHT CHEEK
LOCATION DETAILED: RIGHT MEDIAL UPPER BACK
LOCATION DETAILED: LEFT PROXIMAL PRETIBIAL REGION
LOCATION DETAILED: EPIGASTRIC SKIN
LOCATION DETAILED: LEFT SUPERIOR LATERAL NECK
LOCATION DETAILED: LEFT DORSAL HAND
LOCATION DETAILED: RIGHT ANTERIOR THIGH
LOCATION DETAILED: RIGHT INGUINAL CREASE

## 2024-09-18 ASSESSMENT — LOCATION ZONE DERM
LOCATION ZONE: NECK
LOCATION ZONE: LIP
LOCATION ZONE: TRUNK
LOCATION ZONE: LEG
LOCATION ZONE: FACE
LOCATION ZONE: HAND

## 2024-09-18 NOTE — PROCEDURE: DIAGNOSIS COMMENT
Detail Level: Detailed
Comment: [id number], hx of MM 0.5 MM in thickness. Excised 10/2/23
Render Risk Assessment In Note?: no

## 2024-09-18 NOTE — PROCEDURE: ADDITIONAL NOTES
Additional Notes: The patient states that the hernia has been there for over 18 years
Detail Level: Detailed
Render Risk Assessment In Note?: no

## 2024-09-18 NOTE — PROCEDURE: MIPS QUALITY
Detail Level: Detailed
Quality 137: Melanoma: Continuity Of Care - Recall System: Patient information entered into a recall system that includes: target date for the next exam specified AND a process to follow up with patients regarding missed or unscheduled appointments
Quality 130: Documentation Of Current Medications In The Medical Record: Current Medications Documented
Quality 397: Melanoma: Reporting: Pathology report includes the pT Category, thickness, ulceration and mitotic rate, peripheral and deep margin status and presence or absence of microsatellitosis for invasive tumors.
Quality 226: Preventive Care And Screening: Tobacco Use: Screening And Cessation Intervention: Patient screened for tobacco use and is an ex/non-smoker
sensory intact

## 2024-09-18 NOTE — PROCEDURE: LIQUID NITROGEN
Render Note In Bullet Format When Appropriate: No
Spray Paint Text: The liquid nitrogen was applied to the skin utilizing a spray paint frosting technique.
Medical Necessity Clause: This procedure was medically necessary because the lesions that were treated were:
Number Of Freeze-Thaw Cycles: 1 freeze-thaw cycle
Show Aperture Variable?: Yes
Duration Of Freeze Thaw-Cycle (Seconds): 3
Post-Care Instructions: I reviewed with the patient in detail post-care instructions. Patient is to wear sunprotection, and avoid picking at any of the treated lesions. Pt may apply Vaseline to crusted or scabbing areas.
Detail Level: Detailed
Aperture Size (Optional): C
Medical Necessity Information: It is in your best interest to select a reason for this procedure from the list below. All of these items fulfill various CMS LCD requirements except the new and changing color options.
Consent: The patient's verbal consent was obtained including but not limited to risks of crusting, scabbing, blistering, scarring, darker or lighter pigmentary change, recurrence, incomplete removal and infection.
Consent: The patient's verbal  consent was obtained including but not limited to risks of crusting, scabbing, blistering, scarring, darker or lighter pigmentary change, recurrence, incomplete removal and infection.
Number Of Freeze-Thaw Cycles: 2 freeze-thaw cycles
Detail Level: Simple

## 2024-10-19 NOTE — CARE PLAN
Problem: Communication  Goal: The ability to communicate needs accurately and effectively will improve  Outcome: PROGRESSING AS EXPECTED  Note: Call light within reach. Educated pt to use call light as needed. Reorient and re-educate as needed. Continue to monitor.       Problem: Safety  Goal: Will remain free from injury  Outcome: PROGRESSING AS EXPECTED  Note: Provide assistance with mobility and use proper assistive devices. Educate pt about risk for injury and reinforce as needed. Continue to monitor.     Goal: Will remain free from falls  Outcome: PROGRESSING AS EXPECTED  Note: Treaded socks and bed/strip alarm on, side rails up x 2. Call light within reach. Pt educated to call for assistance. Reinforce as needed. Continue to monitor.         Problem: Venous Thromboembolism (VTW)/Deep Vein Thrombosis (DVT) Prevention:  Goal: Patient will participate in Venous Thrombosis (VTE)/Deep Vein Thrombosis (DVT)Prevention Measures  Outcome: PROGRESSING AS EXPECTED  Flowsheets (Taken 7/13/2020 2100)  SCDs, Sequential Compression Device: On  Note: Assess and monitor for anticoagulation complications. Educate on importance of VTE prophylaxis. Encourage use of SCDs as ordered, ROM and ambulation, as tolerated. Continue to monitor.         Benitez rates both his anxiety and depression as 8/10.  He still having suicidal thoughts, but denies having any specific plan.  Still having AH, states the voices are telling him to talk his way out of here so that he can kill himself.  He remains compliant with taking his medications.  Does not interact with staff/patients.  Came out of his room to watch tv, but does not interact with anyone.  Still c/o stabbing pain to his abdomen and was given Maalox.  He was also given Glycolox for constipation as he has not had BM in 3 days.  He was given Vistaril for anxiety.  Will continue to monitor and provide assistance as needed.

## 2024-10-24 ENCOUNTER — HOSPITAL ENCOUNTER (OUTPATIENT)
Dept: RADIOLOGY | Facility: MEDICAL CENTER | Age: 79
End: 2024-10-24
Attending: SURGERY
Payer: COMMERCIAL

## 2024-10-24 DIAGNOSIS — I65.22 CAROTID ARTERY STENOSIS, ASYMPTOMATIC, LEFT: ICD-10-CM

## 2024-10-24 PROCEDURE — 93880 EXTRACRANIAL BILAT STUDY: CPT

## 2024-10-24 PROCEDURE — 93880 EXTRACRANIAL BILAT STUDY: CPT | Mod: 26 | Performed by: INTERNAL MEDICINE

## 2025-01-21 ENCOUNTER — APPOINTMENT (OUTPATIENT)
Dept: RADIOLOGY | Facility: MEDICAL CENTER | Age: 80
DRG: 101 | End: 2025-01-21
Attending: EMERGENCY MEDICINE
Payer: COMMERCIAL

## 2025-01-21 ENCOUNTER — HOSPITAL ENCOUNTER (INPATIENT)
Facility: MEDICAL CENTER | Age: 80
LOS: 1 days | DRG: 101 | End: 2025-01-22
Attending: EMERGENCY MEDICINE | Admitting: HOSPITALIST
Payer: COMMERCIAL

## 2025-01-21 DIAGNOSIS — E87.1 HYPONATREMIA: ICD-10-CM

## 2025-01-21 DIAGNOSIS — R47.9 SPEECH DISTURBANCE, UNSPECIFIED TYPE: ICD-10-CM

## 2025-01-21 DIAGNOSIS — Z85.810 HISTORY OF TONGUE CANCER: ICD-10-CM

## 2025-01-21 DIAGNOSIS — R56.9 SEIZURE (HCC): ICD-10-CM

## 2025-01-21 DIAGNOSIS — I10 PRIMARY HYPERTENSION: ICD-10-CM

## 2025-01-21 PROBLEM — Z71.89 ACP (ADVANCE CARE PLANNING): Status: ACTIVE | Noted: 2025-01-21

## 2025-01-21 PROBLEM — Z85.819 HISTORY OF THROAT CANCER: Status: ACTIVE | Noted: 2025-01-21

## 2025-01-21 PROBLEM — I65.23 BILATERAL CAROTID ARTERY STENOSIS: Status: ACTIVE | Noted: 2025-01-21

## 2025-01-21 PROBLEM — E86.0 DEHYDRATION: Status: ACTIVE | Noted: 2025-01-21

## 2025-01-21 LAB
ABO GROUP BLD: ABNORMAL
ALBUMIN SERPL BCP-MCNC: 4.5 G/DL (ref 3.2–4.9)
ALBUMIN/GLOB SERPL: 2 G/DL
ALP SERPL-CCNC: 74 U/L (ref 30–99)
ALT SERPL-CCNC: 31 U/L (ref 2–50)
ANION GAP SERPL CALC-SCNC: 10 MMOL/L (ref 7–16)
ANION GAP SERPL CALC-SCNC: 11 MMOL/L (ref 7–16)
ANION GAP SERPL CALC-SCNC: 20 MMOL/L (ref 7–16)
APTT PPP: 24.3 SEC (ref 24.7–36)
AST SERPL-CCNC: 29 U/L (ref 12–45)
BASOPHILS # BLD AUTO: 1.2 % (ref 0–1.8)
BASOPHILS # BLD: 0.07 K/UL (ref 0–0.12)
BILIRUB SERPL-MCNC: 0.7 MG/DL (ref 0.1–1.5)
BLD GP AB SCN SERPL QL: ABNORMAL
BUN SERPL-MCNC: 10 MG/DL (ref 8–22)
BUN SERPL-MCNC: 12 MG/DL (ref 8–22)
BUN SERPL-MCNC: 13 MG/DL (ref 8–22)
CALCIUM ALBUM COR SERPL-MCNC: 8.7 MG/DL (ref 8.5–10.5)
CALCIUM SERPL-MCNC: 8.7 MG/DL (ref 8.5–10.5)
CALCIUM SERPL-MCNC: 8.7 MG/DL (ref 8.5–10.5)
CALCIUM SERPL-MCNC: 9.1 MG/DL (ref 8.5–10.5)
CHLORIDE SERPL-SCNC: 89 MMOL/L (ref 96–112)
CHLORIDE SERPL-SCNC: 92 MMOL/L (ref 96–112)
CHLORIDE SERPL-SCNC: 94 MMOL/L (ref 96–112)
CO2 SERPL-SCNC: 18 MMOL/L (ref 20–33)
CO2 SERPL-SCNC: 26 MMOL/L (ref 20–33)
CO2 SERPL-SCNC: 26 MMOL/L (ref 20–33)
CREAT SERPL-MCNC: 0.65 MG/DL (ref 0.5–1.4)
CREAT SERPL-MCNC: 0.75 MG/DL (ref 0.5–1.4)
CREAT SERPL-MCNC: 0.8 MG/DL (ref 0.5–1.4)
EKG IMPRESSION: NORMAL
EOSINOPHIL # BLD AUTO: 0.17 K/UL (ref 0–0.51)
EOSINOPHIL NFR BLD: 2.9 % (ref 0–6.9)
ERYTHROCYTE [DISTWIDTH] IN BLOOD BY AUTOMATED COUNT: 42.4 FL (ref 35.9–50)
EST. AVERAGE GLUCOSE BLD GHB EST-MCNC: 126 MG/DL
GFR SERPLBLD CREATININE-BSD FMLA CKD-EPI: 90 ML/MIN/1.73 M 2
GFR SERPLBLD CREATININE-BSD FMLA CKD-EPI: 92 ML/MIN/1.73 M 2
GFR SERPLBLD CREATININE-BSD FMLA CKD-EPI: 96 ML/MIN/1.73 M 2
GLOBULIN SER CALC-MCNC: 2.3 G/DL (ref 1.9–3.5)
GLUCOSE SERPL-MCNC: 163 MG/DL (ref 65–99)
GLUCOSE SERPL-MCNC: 175 MG/DL (ref 65–99)
GLUCOSE SERPL-MCNC: 93 MG/DL (ref 65–99)
HBA1C MFR BLD: 6 % (ref 4–5.6)
HCT VFR BLD AUTO: 45.5 % (ref 42–52)
HGB BLD-MCNC: 15.8 G/DL (ref 14–18)
IMM GRANULOCYTES # BLD AUTO: 0.02 K/UL (ref 0–0.11)
IMM GRANULOCYTES NFR BLD AUTO: 0.3 % (ref 0–0.9)
INR PPP: 0.99 (ref 0.87–1.13)
LYMPHOCYTES # BLD AUTO: 1.32 K/UL (ref 1–4.8)
LYMPHOCYTES NFR BLD: 22.8 % (ref 22–41)
MAGNESIUM SERPL-MCNC: 2.2 MG/DL (ref 1.5–2.5)
MCH RBC QN AUTO: 31.8 PG (ref 27–33)
MCHC RBC AUTO-ENTMCNC: 34.7 G/DL (ref 32.3–36.5)
MCV RBC AUTO: 91.5 FL (ref 81.4–97.8)
MONOCYTES # BLD AUTO: 0.76 K/UL (ref 0–0.85)
MONOCYTES NFR BLD AUTO: 13.1 % (ref 0–13.4)
NEUTROPHILS # BLD AUTO: 3.44 K/UL (ref 1.82–7.42)
NEUTROPHILS NFR BLD: 59.7 % (ref 44–72)
NRBC # BLD AUTO: 0 K/UL
NRBC BLD-RTO: 0 /100 WBC (ref 0–0.2)
OSMOLALITY SERPL: 282 MOSM/KG H2O (ref 278–298)
OSMOLALITY UR: 463 MOSM/KG H2O (ref 300–900)
PLATELET # BLD AUTO: 204 K/UL (ref 164–446)
PMV BLD AUTO: 9.3 FL (ref 9–12.9)
POTASSIUM SERPL-SCNC: 4 MMOL/L (ref 3.6–5.5)
POTASSIUM SERPL-SCNC: 4 MMOL/L (ref 3.6–5.5)
POTASSIUM SERPL-SCNC: 4.4 MMOL/L (ref 3.6–5.5)
PROT SERPL-MCNC: 6.8 G/DL (ref 6–8.2)
PROTHROMBIN TIME: 13.1 SEC (ref 12–14.6)
RBC # BLD AUTO: 4.97 M/UL (ref 4.7–6.1)
RH BLD: ABNORMAL
SODIUM SERPL-SCNC: 127 MMOL/L (ref 135–145)
SODIUM SERPL-SCNC: 129 MMOL/L (ref 135–145)
SODIUM SERPL-SCNC: 130 MMOL/L (ref 135–145)
TROPONIN T SERPL-MCNC: 18 NG/L (ref 6–19)
WBC # BLD AUTO: 5.8 K/UL (ref 4.8–10.8)

## 2025-01-21 PROCEDURE — 95819 EEG AWAKE AND ASLEEP: CPT | Mod: 26 | Performed by: STUDENT IN AN ORGANIZED HEALTH CARE EDUCATION/TRAINING PROGRAM

## 2025-01-21 PROCEDURE — 36415 COLL VENOUS BLD VENIPUNCTURE: CPT

## 2025-01-21 PROCEDURE — 99222 1ST HOSP IP/OBS MODERATE 55: CPT | Mod: 25 | Performed by: PSYCHIATRY & NEUROLOGY

## 2025-01-21 PROCEDURE — 83735 ASSAY OF MAGNESIUM: CPT | Mod: 91

## 2025-01-21 PROCEDURE — 99223 1ST HOSP IP/OBS HIGH 75: CPT | Mod: 25 | Performed by: HOSPITALIST

## 2025-01-21 PROCEDURE — 80053 COMPREHEN METABOLIC PANEL: CPT | Mod: 91

## 2025-01-21 PROCEDURE — 700102 HCHG RX REV CODE 250 W/ 637 OVERRIDE(OP): Performed by: PSYCHIATRY & NEUROLOGY

## 2025-01-21 PROCEDURE — 86901 BLOOD TYPING SEROLOGIC RH(D): CPT

## 2025-01-21 PROCEDURE — 770020 HCHG ROOM/CARE - TELE (206)

## 2025-01-21 PROCEDURE — 4A10X4Z MONITORING OF CENTRAL NERVOUS ELECTRICAL ACTIVITY, EXTERNAL APPROACH: ICD-10-PCS | Performed by: STUDENT IN AN ORGANIZED HEALTH CARE EDUCATION/TRAINING PROGRAM

## 2025-01-21 PROCEDURE — 84484 ASSAY OF TROPONIN QUANT: CPT

## 2025-01-21 PROCEDURE — 99497 ADVNCD CARE PLAN 30 MIN: CPT | Mod: 25 | Performed by: HOSPITALIST

## 2025-01-21 PROCEDURE — 83935 ASSAY OF URINE OSMOLALITY: CPT

## 2025-01-21 PROCEDURE — 86900 BLOOD TYPING SEROLOGIC ABO: CPT

## 2025-01-21 PROCEDURE — 72125 CT NECK SPINE W/O DYE: CPT

## 2025-01-21 PROCEDURE — 85610 PROTHROMBIN TIME: CPT

## 2025-01-21 PROCEDURE — 95819 EEG AWAKE AND ASLEEP: CPT | Performed by: STUDENT IN AN ORGANIZED HEALTH CARE EDUCATION/TRAINING PROGRAM

## 2025-01-21 PROCEDURE — A9270 NON-COVERED ITEM OR SERVICE: HCPCS | Performed by: PSYCHIATRY & NEUROLOGY

## 2025-01-21 PROCEDURE — 80061 LIPID PANEL: CPT

## 2025-01-21 PROCEDURE — 83930 ASSAY OF BLOOD OSMOLALITY: CPT

## 2025-01-21 PROCEDURE — 70450 CT HEAD/BRAIN W/O DYE: CPT

## 2025-01-21 PROCEDURE — 85730 THROMBOPLASTIN TIME PARTIAL: CPT

## 2025-01-21 PROCEDURE — 84443 ASSAY THYROID STIM HORMONE: CPT

## 2025-01-21 PROCEDURE — 83036 HEMOGLOBIN GLYCOSYLATED A1C: CPT

## 2025-01-21 PROCEDURE — 80048 BASIC METABOLIC PNL TOTAL CA: CPT

## 2025-01-21 PROCEDURE — 93005 ELECTROCARDIOGRAM TRACING: CPT | Mod: TC | Performed by: EMERGENCY MEDICINE

## 2025-01-21 PROCEDURE — 99285 EMERGENCY DEPT VISIT HI MDM: CPT

## 2025-01-21 PROCEDURE — 85025 COMPLETE CBC W/AUTO DIFF WBC: CPT

## 2025-01-21 PROCEDURE — 85027 COMPLETE CBC AUTOMATED: CPT

## 2025-01-21 PROCEDURE — 94760 N-INVAS EAR/PLS OXIMETRY 1: CPT

## 2025-01-21 PROCEDURE — 700111 HCHG RX REV CODE 636 W/ 250 OVERRIDE (IP): Mod: JW | Performed by: EMERGENCY MEDICINE

## 2025-01-21 PROCEDURE — 86850 RBC ANTIBODY SCREEN: CPT

## 2025-01-21 PROCEDURE — 96374 THER/PROPH/DIAG INJ IV PUSH: CPT

## 2025-01-21 PROCEDURE — 71045 X-RAY EXAM CHEST 1 VIEW: CPT

## 2025-01-21 RX ORDER — LORAZEPAM 2 MG/ML
1 INJECTION INTRAMUSCULAR
Status: DISCONTINUED | OUTPATIENT
Start: 2025-01-21 | End: 2025-01-22 | Stop reason: HOSPADM

## 2025-01-21 RX ORDER — ASPIRIN 81 MG/1
81 TABLET, CHEWABLE ORAL DAILY
COMMUNITY

## 2025-01-21 RX ORDER — ONDANSETRON 4 MG/1
4 TABLET, ORALLY DISINTEGRATING ORAL EVERY 4 HOURS PRN
Status: DISCONTINUED | OUTPATIENT
Start: 2025-01-21 | End: 2025-01-22 | Stop reason: HOSPADM

## 2025-01-21 RX ORDER — METOPROLOL TARTRATE 25 MG/1
50 TABLET, FILM COATED ORAL 2 TIMES DAILY
Status: DISCONTINUED | OUTPATIENT
Start: 2025-01-22 | End: 2025-01-22 | Stop reason: HOSPADM

## 2025-01-21 RX ORDER — LEVETIRACETAM 500 MG/5ML
500 INJECTION, SOLUTION, CONCENTRATE INTRAVENOUS 2 TIMES DAILY
Status: DISCONTINUED | OUTPATIENT
Start: 2025-01-21 | End: 2025-01-21

## 2025-01-21 RX ORDER — LEVETIRACETAM 500 MG/1
500 TABLET ORAL 2 TIMES DAILY
Status: DISCONTINUED | OUTPATIENT
Start: 2025-01-21 | End: 2025-01-21

## 2025-01-21 RX ORDER — LEVETIRACETAM 500 MG/5ML
30 INJECTION, SOLUTION, CONCENTRATE INTRAVENOUS ONCE
Status: COMPLETED | OUTPATIENT
Start: 2025-01-21 | End: 2025-01-21

## 2025-01-21 RX ORDER — LEVETIRACETAM 500 MG/1
1000 TABLET ORAL 2 TIMES DAILY
Status: DISCONTINUED | OUTPATIENT
Start: 2025-01-21 | End: 2025-01-22 | Stop reason: HOSPADM

## 2025-01-21 RX ORDER — ASPIRIN 81 MG/1
81 TABLET, CHEWABLE ORAL DAILY
Status: DISCONTINUED | OUTPATIENT
Start: 2025-01-21 | End: 2025-01-21

## 2025-01-21 RX ORDER — LEVETIRACETAM 500 MG/5ML
1000 INJECTION, SOLUTION, CONCENTRATE INTRAVENOUS 2 TIMES DAILY
Status: DISCONTINUED | OUTPATIENT
Start: 2025-01-21 | End: 2025-01-22 | Stop reason: HOSPADM

## 2025-01-21 RX ORDER — POLYETHYLENE GLYCOL 3350 17 G/17G
1 POWDER, FOR SOLUTION ORAL
Status: DISCONTINUED | OUTPATIENT
Start: 2025-01-21 | End: 2025-01-22 | Stop reason: HOSPADM

## 2025-01-21 RX ORDER — ASPIRIN 300 MG/1
300 SUPPOSITORY RECTAL DAILY
Status: DISCONTINUED | OUTPATIENT
Start: 2025-01-21 | End: 2025-01-21

## 2025-01-21 RX ORDER — ACETAMINOPHEN 325 MG/1
650 TABLET ORAL EVERY 6 HOURS PRN
Status: DISCONTINUED | OUTPATIENT
Start: 2025-01-21 | End: 2025-01-22 | Stop reason: HOSPADM

## 2025-01-21 RX ORDER — ATORVASTATIN CALCIUM 80 MG/1
80 TABLET, FILM COATED ORAL DAILY
Status: DISCONTINUED | OUTPATIENT
Start: 2025-01-22 | End: 2025-01-22 | Stop reason: HOSPADM

## 2025-01-21 RX ORDER — NEBIVOLOL 10 MG/1
10 TABLET ORAL EVERY EVENING
Status: DISCONTINUED | OUTPATIENT
Start: 2025-01-21 | End: 2025-01-21

## 2025-01-21 RX ORDER — LEVOTHYROXINE SODIUM 50 UG/1
50 TABLET ORAL
Status: DISCONTINUED | OUTPATIENT
Start: 2025-01-22 | End: 2025-01-22 | Stop reason: HOSPADM

## 2025-01-21 RX ORDER — AMOXICILLIN 250 MG
2 CAPSULE ORAL EVERY EVENING
Status: DISCONTINUED | OUTPATIENT
Start: 2025-01-21 | End: 2025-01-22 | Stop reason: HOSPADM

## 2025-01-21 RX ORDER — ASPIRIN 81 MG/1
81 TABLET, CHEWABLE ORAL DAILY
Status: DISCONTINUED | OUTPATIENT
Start: 2025-01-22 | End: 2025-01-22 | Stop reason: HOSPADM

## 2025-01-21 RX ORDER — ONDANSETRON 2 MG/ML
4 INJECTION INTRAMUSCULAR; INTRAVENOUS EVERY 4 HOURS PRN
Status: DISCONTINUED | OUTPATIENT
Start: 2025-01-21 | End: 2025-01-22 | Stop reason: HOSPADM

## 2025-01-21 RX ORDER — ENOXAPARIN SODIUM 100 MG/ML
40 INJECTION SUBCUTANEOUS DAILY
Status: DISCONTINUED | OUTPATIENT
Start: 2025-01-22 | End: 2025-01-22 | Stop reason: HOSPADM

## 2025-01-21 RX ADMIN — LEVETIRACETAM 1000 MG: 500 TABLET, FILM COATED ORAL at 17:57

## 2025-01-21 RX ADMIN — LEVETIRACETAM 2240 MG: 100 INJECTION, SOLUTION INTRAVENOUS at 12:34

## 2025-01-21 ASSESSMENT — ENCOUNTER SYMPTOMS
SHORTNESS OF BREATH: 0
EYE DISCHARGE: 0
FLANK PAIN: 0
STRIDOR: 0
COUGH: 0
NERVOUS/ANXIOUS: 0
EYE REDNESS: 0
MYALGIAS: 0
CHILLS: 0
BRUISES/BLEEDS EASILY: 0
VOMITING: 0
ABDOMINAL PAIN: 0
FEVER: 0

## 2025-01-21 ASSESSMENT — FIBROSIS 4 INDEX: FIB4 SCORE: 2.94

## 2025-01-21 ASSESSMENT — PAIN DESCRIPTION - PAIN TYPE: TYPE: ACUTE PAIN

## 2025-01-21 NOTE — ASSESSMENT & PLAN NOTE
History suggestive of atonic clonic seizures.  CT scan of the head no evidence of mass, hemorrhage or infarction  I will order lorazepam as needed for seizures.  I will check an EEG  I will check MRI of the brain  Fall, aspiration, precautions, & seizure precautions  Neurology consulted, appreciate recommendations   Will load with Keppra.  Will start Keppra every 12 hours.  Start lorazepam as needed for breakthrough seizures

## 2025-01-21 NOTE — ED NOTES
PT to red 8 from CT with trauma, RN. Report from CARISA Fosetr.  Pt on bed alarm, remains on continuous monitoring. Family at bedside. Call light remains in reach.   Abrasions & bruising noted to bilateral arms upon arrival.   Wife states pt on daily aspirin, denies pt falling.

## 2025-01-21 NOTE — H&P
"Hospital Medicine History & Physical Note    Date of Service  1/21/2025    Primary Care Physician  Alex Bunch M.D.    Consultants  Neurology, Dr Navarro     Code Status  Full Code    Chief Complaint  Chief Complaint   Patient presents with    Possible Stroke     BIB EMS after 911 called by wife. Wife reports she was speaking with  and then he started slurring his words and then \"went unconscious.\" Found to be uncooperative by EMS. Reported history of 3 prior strokes. LKW 1100am. FSBS 180.      History of Presenting Illness  Marco Echeverria is a 79 y.o. male with a past medical history of hyperlipidemia, hypothyroidism, throat cancer, hypertension, and history of cerebrovascular accident.  In addition to left carotid artery stenosis s/p left carotid endarterectomy who presented 1/21/2025 with slurred speech concerning for stroke.  Apparently the patient had speech changes and was making incomprehensible sounds and then he suddenly lost consciousness.  There were no abnormal jerking movements.  There is no fecal urinary incontinence.    I discussed the plan of care with emergency physician, the patient , and patient family present at bedside in the emergency room    Review of Systems  Review of Systems   Constitutional:  Negative for chills and fever.   Eyes:  Negative for discharge and redness.   Respiratory:  Negative for cough, shortness of breath and stridor.    Cardiovascular:  Negative for chest pain and leg swelling.   Gastrointestinal:  Negative for abdominal pain and vomiting.   Genitourinary:  Negative for flank pain.   Musculoskeletal:  Negative for myalgias.   Skin: Negative.    Neurological:  Speech change: Resolved.        Transient altered mental status, resolved   Endo/Heme/Allergies:  Does not bruise/bleed easily.   Psychiatric/Behavioral:  The patient is not nervous/anxious.      Past Medical History   has a past medical history of CATARACT, History of malignant neoplasm of tonsil " (2011), History of tongue cancer (2011), Late effect of radiation, and Radiation caries.    Surgical History   has a past surgical history that includes other (1990s); cataract extraction with iol; septoplasty (7/15/2010); antrostomy (7/15/2010); ethmoidectomy (7/15/2010); tonsillectomy; laryngoscopy (8/23/2011); tumor excision with biopsy (8/23/2011); vasectomy; pr thromboendartectmy neck,neck incis (Right, 7/13/2020); and pr thromboendartectmy neck,neck incis (Left, 3/24/2024).     Family History  family history includes Hypertension in his mother.      Social History   reports that he has quit smoking. He quit smokeless tobacco use about 49 years ago. He reports current alcohol use. He reports that he does not use drugs.    Allergies  No Known Allergies    Medications  Prior to Admission Medications   Prescriptions Last Dose Informant Patient Reported? Taking?   atorvastatin (LIPITOR) 80 MG tablet   No No   Sig: Take 1 Tablet by mouth every day.   clobetasol (TEMOVATE) 0.05 % Ointment   Yes No   Sig: Apply  topically 2 times a day.   levothyroxine (SYNTHROID) 50 MCG Tab  Patient, Family Member Yes No   Sig: Take 50 mcg by mouth every morning on an empty stomach.   nebivolol (BYSTOLIC) 10 MG Tab  Patient, Family Member Yes No   Sig: Take 10 mg by mouth every evening.      Facility-Administered Medications: None     Physical Exam  Temp:  [36.8 °C (98.2 °F)] 36.8 °C (98.2 °F)  Pulse:  [74-93] 74  Resp:  [16-18] 16  BP: (149-208)/() 149/71  SpO2:  [91 %-95 %] 95 %  Blood Pressure : (!) 178/84   Temperature: 36.8 °C (98.2 °F)   Pulse: 85   Respiration: 18   Pulse Oximetry: 91 %     Physical Exam  Constitutional:       General: He is not in acute distress.     Appearance: He is not ill-appearing or diaphoretic.   HENT:      Head: Atraumatic.      Right Ear: External ear normal.      Left Ear: External ear normal.      Nose: No congestion or rhinorrhea.      Mouth/Throat:      Mouth: Mucous membranes are dry.  "  Eyes:      General: No scleral icterus.        Right eye: No discharge.         Left eye: No discharge.      Pupils: Pupils are equal, round, and reactive to light.   Cardiovascular:      Rate and Rhythm: Normal rate and regular rhythm.   Pulmonary:      Effort: Pulmonary effort is normal.      Comments: Saturating well on room air.  Is able to speak in full sentences  Abdominal:      General: There is no distension.   Musculoskeletal:      Cervical back: Neck supple. No rigidity. No muscular tenderness.      Right lower leg: No edema.      Left lower leg: No edema.   Skin:     General: Skin is dry.      Capillary Refill: Capillary refill takes 2 to 3 seconds.      Coloration: Skin is not jaundiced or pale.   Neurological:      Mental Status: He is alert and oriented to person, place, and time.      Coordination: Coordination normal.      Comments: Speech is fluent.  Power is 5/5 in both upper and lower extremities.   Psychiatric:         Mood and Affect: Mood normal.         Behavior: Behavior normal.       Laboratory:  Recent Labs     01/21/25  1156   WBC 5.8   RBC 4.97   HEMOGLOBIN 15.8   HEMATOCRIT 45.5   MCV 91.5   MCH 31.8   MCHC 34.7   RDW 42.4   PLATELETCT 204   MPV 9.3     Recent Labs     01/21/25  1156 01/21/25  1411   SODIUM 127* 129*   POTASSIUM 4.4 4.0   CHLORIDE 89* 92*   CO2 18* 26   GLUCOSE 175* 163*   BUN 13 12   CREATININE 0.80 0.75   CALCIUM 9.1 8.7     Recent Labs     01/21/25  1156 01/21/25  1411   ALTSGPT 31  --    ASTSGOT 29  --    ALKPHOSPHAT 74  --    TBILIRUBIN 0.7  --    GLUCOSE 175* 163*     Recent Labs     01/21/25  1156   APTT 24.3*   INR 0.99     No results for input(s): \"NTPROBNP\" in the last 72 hours.      Recent Labs     01/21/25  1156   TROPONINT 18     Imaging:  DX-CHEST-PORTABLE (1 VIEW)   Final Result      1.  Persistent medial right base atelectasis.      CT-CSPINE WITHOUT PLUS RECONS   Final Result      No acute fracture or dislocation of the cervical spine.      CT-HEAD W/O "   Final Result      No acute intracranial abnormality.                                 MR-BRAIN-WITH & W/O    (Results Pending)     I personally reviewed patient EKG. It shows sinus rhythm with a rate of 87.  There is no ST elevation.  There is 0.5 mm ST depressions in lead II, 3, aVF and leads V4-V6.  There is evidence of LVH.  QTc is 444.      Assessment/Plan:  Justification for Admission Status  I anticipate this patient will require at least two midnights for appropriate medical management, necessitating inpatient admission because the patient has speech changes and altered mental status concerning for seizure disorder.  He will require initiation and titration of anti- epileptic pain medications.  He will require further workup with MRI and EEG    Patient will need a Telemetry bed on MEDICAL service.      * Seizure (HCC)- (present on admission)  Assessment & Plan  History suggestive of atonic clonic seizures.  CT scan of the head no evidence of mass, hemorrhage or infarction  I will order lorazepam as needed for seizures.  I will check an EEG  I will check MRI of the brain  Fall, aspiration, precautions, & seizure precautions  Neurology consulted, appreciate recommendations   Will load with Keppra.  Will start Keppra every 12 hours.  Start lorazepam as needed for breakthrough seizures    Primary hypertension- (present on admission)  Assessment & Plan  I resume home nebivolol with hold parameters     Dehydration- (present on admission)  Assessment & Plan  Likely secondary to reduced oral intake   Encourage oral intake as tolerated, antiemetics as needed.  Intravenous hydration until adequate oral intake is achieved.      History of throat cancer- (present on admission)  Assessment & Plan  S/p radiation   Given the concern for seizures, I will check an MRI brain with and without contrast     Bilateral carotid artery stenosis- (present on admission)  Assessment & Plan  S/p endarterectomy with Dr. Cai  And  resume atorvastatin.  I will resume aspirin    Hyponatremia- (present on admission)  Assessment & Plan  Hypovolemic hyponatremia   I expect Na to improve with IVF  I am ordering serum and urine osmolarity   I am ordering a BMPs every 4 hours, avoid over correction, aim for 8-10 meq correction in 24 hours      ACP (advance care planning)- (present on admission)  Assessment & Plan  I had a discussion with the patient and family present at bedside in the emergency room regarding goals of care, diagnoses, prognosis, and CODE STATUS. We discussed his prognosis and comorbidities.  Patient has advanced age of 79 years.  He has a number of chronic medical problems including hyperlipidemia, history of throat cancer, bilateral carotid artery stenosis       Hypothyroidism- (present on admission)  Assessment & Plan  I will resume home levothyroxine     Hyperlipidemia- (present on admission)  Assessment & Plan  Cardiac diet   I will resume home atorvastatin       VTE prophylaxis: SCDs/TEDs and enoxaparin ppx    I had a discussion with the patient and family present at bedside in the emergency room regarding goals of care, diagnoses, prognosis, and CODE STATUS. We discussed his prognosis and comorbidities.  Patient has advanced age of 79 years.  He has a number of chronic medical problems including hyperlipidemia, history of throat cancer, bilateral carotid artery stenosis  I spent 18 minutes on advanced care planning.

## 2025-01-21 NOTE — ED NOTES
Unable to obtain med rec at this time. Medical staff at bedside. Contacted spouse via phone call, states family is on their way to hospital.

## 2025-01-21 NOTE — ED NOTES
MRI screening complete. Family at bedside, call light within reach. Seizure precautions in place.

## 2025-01-21 NOTE — ED TRIAGE NOTES
"Chief Complaint   Patient presents with    Possible Stroke     BIB EMS after 911 called by wife. Wife reports she was speaking with  and then he started slurring his words and then \"went unconscious.\" Found to be uncooperative by EMS. Reported history of 3 prior strokes. LKW 1100am. FSBS 180.      NIHSS by Dr. Bingham: 2. Report to [place], Patient placed on monitoring.   "

## 2025-01-21 NOTE — ASSESSMENT & PLAN NOTE
Likely secondary to reduced oral intake   Encourage oral intake as tolerated, antiemetics as needed.  Intravenous hydration until adequate oral intake is achieved.

## 2025-01-21 NOTE — ASSESSMENT & PLAN NOTE
Hypovolemic hyponatremia   I expect Na to improve with IVF  I am ordering serum and urine osmolarity   I am ordering a BMPs every 4 hours, avoid over correction, aim for 8-10 meq correction in 24 hours

## 2025-01-21 NOTE — ED NOTES
Med Rec complete per patient's spouse with med list at bedside   Allergies reviewed  Antibiotics in the past 30 days:no  Anticoagulant in past 14 days:no  Pharmacy patient utilizes:[address]    Pt is on a daily Aspirin

## 2025-01-21 NOTE — CONSULTS
"Neurology Initial Consult H&P  Neurohospitalist Service, [facility]    Referring Physician: Rip Hanson M.D.    Chief Complaint   Patient presents with    Possible Stroke     BIB EMS after 911 called by wife. Wife reports she was speaking with  and then he started slurring his words and then \"went unconscious.\" Found to be uncooperative by EMS. Reported history of 3 prior strokes. LKW 1100am. FSBS 180.        HPI: Marco Echeverria is a 79 year old man with history of cervical radiation, s/p L CEA in March 2024, on ASA therapy, presenting with episode of LOC and confusion.  Marco is amnestic of his event.  Per medics, he was talking to his wife, than transitioned to gibberish speech.  This was followed by an LOC event.  Unclear downtime, but it was on order of minutes.  EMS called, and at time of their arrival, Marco was awake, but confused with gibberish speech pattern.  He was agitated and combative and required restraints.  On arrival to [facility], Marco continued to have mild dysarthria, but was oriented, appropriate in speech, and did not require restraints.  He was easily directable.  A non contrast head CT was negative. Denies infectious prodrome or constitutional symptoms.    Review of systems: In addition to what is detailed in the HPI above, all other systems reviewed and are negative.    Past Medical History:    has a past medical history of CATARACT, History of malignant neoplasm of tonsil (2011), History of tongue cancer (2011), Late effect of radiation, and Radiation caries.    FHx:  family history includes Hypertension in his mother.    SHx:   reports that he has quit smoking. He quit smokeless tobacco use about 49 years ago. He reports current alcohol use. He reports that he does not use drugs.    Allergies:  No Known Allergies    Medications:    Current Facility-Administered Medications:     acetaminophen (Tylenol) tablet 650 mg, 650 mg, Oral, Q6HRS PRN, Rip CRUZ" "CHEN Hanson    senna-docusate (Pericolace Or Senokot S) 8.6-50 MG per tablet 2 Tablet, 2 Tablet, Oral, Q EVENING **AND** polyethylene glycol/lytes (Miralax) Packet 1 Packet, 1 Packet, Oral, QDAY PRN, Rip Hanson M.D.    [START ON 1/22/2025] enoxaparin (Lovenox) inj 40 mg, 40 mg, Subcutaneous, DAILY AT 1800, Rip Hanson M.D.    ondansetron (Zofran) syringe/vial injection 4 mg, 4 mg, Intravenous, Q4HRS PRN, Rip Hanson M.D.    ondansetron (Zofran ODT) dispertab 4 mg, 4 mg, Oral, Q4HRS PRN, Rip Hanson M.D.    LORazepam (Ativan) injection 1 mg, 1 mg, Intravenous, Q HOUR PRN, Rip Hanson M.D.    levETIRAcetam (Keppra) tablet 1,000 mg, 1,000 mg, Oral, BID **OR** levETIRAcetam (Keppra) injection 1,000 mg, 1,000 mg, Intravenous, BID, Sammy Navarro M.D.    Current Outpatient Medications:     clobetasol (TEMOVATE) 0.05 % Ointment, Apply  topically 2 times a day., Disp: , Rfl:     atorvastatin (LIPITOR) 80 MG tablet, Take 1 Tablet by mouth every day., Disp: 30 Tablet, Rfl: 0    nebivolol (BYSTOLIC) 10 MG Tab, Take 10 mg by mouth every evening., Disp: , Rfl:     levothyroxine (SYNTHROID) 50 MCG Tab, Take 50 mcg by mouth every morning on an empty stomach., Disp: , Rfl:     Physical Examination:     General: Patient is awake and in no acute distress  Eye: Examination of optic disks not indicated at this time given acuity of consult  Neck: There is normal range of motion  CV: regular rate   Extremities:  clear, dry, intact, without peripheral edema    NEUROLOGICAL EXAM:     BP (!) (P) 169/88   Pulse (P) 85   Temp 36.8 °C (98.2 °F) (Temporal)   Resp 18   Ht 1.88 m (6' 2\")   Wt 74.8 kg (165 lb)   SpO2 (P) 93%   BMI 21.18 kg/m²       Mental status: Awake, alert and oriented to self and year  Speech and language: Speech is mildly dysarthric but fluent. The patient is able to name and repeat, and follow commands  Cranial nerve exam: Visual fields are full. There is no nystagmus. Extraocular muscles " are intact. Face is symmetric.   Motor exam: There is sustained antigravity with no downward drift in bilateral arms and legs.    Sensory exam: Reacts to tactile in all 4 extremities, no neglect to double stim Coordination: No ataxia on bilateral finger-to-nose testing  Gait: Deferred due to patient preference      NIHSS: National Institutes of Health Stroke Scale    [0] 1a:Level of Consciousness    0-alert 1-drowsy   2-stupor   3-coma  [0] 1b:LOC Questions                  0-both  1-one      2-neither  [0] 1c:LOC Commands                   0-both  1-one      2-neither  [0] 2: Best Gaze                     0-nl    1-partial  2-forced  [0] 3: Visual Fields                   0-nl    1-partial  2-complete 3-bilat  [0] 4: Facial Paresis                0-nl    1-minor    2-partial  3-full  MOTOR                       0-nl  [0] 5: Right Arm           1-drift  [0] 6: Left Arm             2-some effort vs gravity  [0] 7: Right Leg           3-no effort vs gravity  [0] 8: Left Leg             4-no movement                             x-untestable  [0] 9: Limb Ataxia                    0-abs   1-1_limb   2-2+_limbs       x-untestable  [0] 10:Sensory                        0-nl    1-partial  2-dense  [0] 11:Best Language/Aphasia         0-nl    1-mild/mod 2-severe   3-mute  [1] 12:Dysarthria                     0-nl    1-mild/mod 2-severe       x-untestable  [0] 13:Neglect/Inattention            0-none  1-partial  2-complete  [0] TOTAL      Objective Data:    Labs:  Lab Results   Component Value Date/Time    PROTHROMBTM 13.1 01/21/2025 11:56 AM    INR 0.99 01/21/2025 11:56 AM      Lab Results   Component Value Date/Time    WBC 5.8 01/21/2025 11:56 AM    RBC 4.97 01/21/2025 11:56 AM    HEMOGLOBIN 15.8 01/21/2025 11:56 AM    HEMATOCRIT 45.5 01/21/2025 11:56 AM    MCV 91.5 01/21/2025 11:56 AM    MCH 31.8 01/21/2025 11:56 AM    MCHC 34.7 01/21/2025 11:56 AM    MPV 9.3 01/21/2025 11:56 AM    NEUTSPOLYS 59.70 01/21/2025 11:56 AM     LYMPHOCYTES 22.80 01/21/2025 11:56 AM    MONOCYTES 13.10 01/21/2025 11:56 AM    EOSINOPHILS 2.90 01/21/2025 11:56 AM    BASOPHILS 1.20 01/21/2025 11:56 AM    HYPOCHROMIA 1+ 07/12/2010 12:00 PM    ANISOCYTOSIS 1+ 07/12/2010 12:00 PM      Lab Results   Component Value Date/Time    SODIUM 127 (L) 01/21/2025 11:56 AM    POTASSIUM 4.4 01/21/2025 11:56 AM    CHLORIDE 89 (L) 01/21/2025 11:56 AM    CO2 18 (L) 01/21/2025 11:56 AM    GLUCOSE 175 (H) 01/21/2025 11:56 AM    BUN 13 01/21/2025 11:56 AM    CREATININE 0.80 01/21/2025 11:56 AM      Lab Results   Component Value Date/Time    CHOLSTRLTOT 136 03/24/2024 03:12 AM    LDL 57 03/24/2024 03:12 AM    HDL 68 03/24/2024 03:12 AM    TRIGLYCERIDE 57 03/24/2024 03:12 AM       Lab Results   Component Value Date/Time    ALKPHOSPHAT 74 01/21/2025 11:56 AM    ASTSGOT 29 01/21/2025 11:56 AM    ALTSGPT 31 01/21/2025 11:56 AM    TBILIRUBIN 0.7 01/21/2025 11:56 AM        Imaging/Testing:    I interpreted and/or reviewed the patient's neuroimaging    DX-CHEST-PORTABLE (1 VIEW)   Final Result      1.  Persistent medial right base atelectasis.      CT-CSPINE WITHOUT PLUS RECONS   Final Result      No acute fracture or dislocation of the cervical spine.      CT-HEAD W/O   Final Result      No acute intracranial abnormality.                                 MR-BRAIN-WITH & W/O    (Results Pending)       Assessment and Plan:  Marco Echeverria is a 79 year old man with LOC event today, followed by agitation and confusion.  Neurologic exam is reassuring with no lateralizing focal deficits.  Clinical semiology not suggestive of ischemic process, and more aligned to likely seizure event.  He was given a keppra load, and started on maintenance therapy.  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    Problem list:   LOC event   Confusion    Plan:   - admit to Neuroscience, q4h  neurochecks   - attain MRI brain w/wo contrast   - continue keppra 1000mg BID   - may defer EEG given event resolution, and findings will not change medical recommendations   - may continue home medications including ASA, statin, and anti-hypertensives for long-term secondary stroke prevention   - PT/OT/SLP    The evaluation of the patient, and recommended management, was discussed with Dr. Thu Bingham, ER attending.     Sammy Navarro MD  Vascular Neurology

## 2025-01-21 NOTE — ED PROVIDER NOTES
"ER Provider Note    Scribed for Thu Bingham M.d. by Corry Ulloa. 1/21/2025  11:54 AM    Primary Care Provider: Alex Bunch M.D.    CHIEF COMPLAINT   Chief Complaint   Patient presents with    Possible Stroke     BIB EMS after 911 called by wife. Wife reports she was speaking with  and then he started slurring his words and then \"went unconscious.\" Found to be uncooperative by EMS. Reported history of 3 prior strokes. LKW 1100am. FSBS 180.      EXTERNAL RECORDS REVIEWED  Inpatient Notes   patient was admitted here at [facility] March 2024 for strokelike symptoms.  Patient was found to have moderate stenosis of the left carotid seen on CTA of the neck.  He was taken to the OR for left carotid endarterectomy with Dr. Tarango.  At that time patient presented with short episode of difficulty speaking.    HPI/ROS  LIMITATION TO HISTORY   Select: : None  OUTSIDE HISTORIAN(S):  EMS present at Charge Desk who provided history as seen below.     Marco Echeverria is a 79 y.o. male who presents to the ED via EMS complaining of as a Code Stroke. EMS reports that the patient was talking with his wife when he started saying gibberish with slurred speech and then went consciousness. Denies any seizure activity. His last known well was at 11:00 AM. EMS adds that the patient had an initial GCS of 5 but is now a GCS of 12. They note that the patient was initially not talking but is now responding. Patient became uncooperative and combative by EMS and was placed in restraints in the ambulance. EMS also reported that the patient did not have any focal or obvious extremity weakness. Patient does have a history of three prior strokes.  Patient is not on any blood thinners.  At this time patient denies any pain anywhere.  He denies headache.  No chest pain.  No palpitations.  No shortness of breath.  EMS reports a \"ground-level fall\" when he lost consciousness but does not know if the patient was sitting in a chair.  " "They cannot tell me much about this \"fall\" and actually cannot even really confirm that it happened.  Patient denies neck pain.  Denies back pain.  Denies rib pain.  He denies feeling injured from the fall although he does have a skin tear to his right forearm.    PAST MEDICAL HISTORY  Past Medical History:   Diagnosis Date    CATARACT     has had surgical correction bilaterally    History of malignant neoplasm of tonsil 2011    s/p Resection, chemoradiation    History of tongue cancer 2011    s/p Resection, chemoradiation    Late effect of radiation     Radiation caries        SURGICAL HISTORY  Past Surgical History:   Procedure Laterality Date    VA THROMBOENDARTECTMY NECK,NECK INCIS Left 3/24/2024    Procedure: ENDARTERECTOMY, CAROTID;  Surgeon: Ofelia Tarango M.D.;  Location: SURGERY [facility];  Service: General    VA THROMBOENDARTECTMY NECK,NECK INCIS Right 7/13/2020    Procedure: ENDARTERECTOMY, CAROTID;  Surgeon: Ofelia Tarango M.D.;  Location: SURGERY [facility];  Service: General    LARYNGOSCOPY  8/23/2011    Performed by OFELIA PORTER at SURGERY SAME DAY [facility]    TUMOR EXCISION WITH BIOPSY  8/23/2011    Performed by OFELIA PORTER at SURGERY SAME DAY [facility]    SEPTOPLASTY  7/15/2010    Performed by OFELIA PORTER at SURGERY SAME DAY [facility]    ANTROSTOMY  7/15/2010    Performed by OFELIA PORTER at SURGERY SAME DAY [facility]    ETHMOIDECTOMY  7/15/2010    Performed by OFELIA PORTER at SURGERY SAME DAY [facility]    CATARACT EXTRACTION WITH IOL      herminio eyes    OTHER  1990s    benign tumor right face    TONSILLECTOMY      VASECTOMY         FAMILY HISTORY  Family History   Problem Relation Age of Onset    Hypertension Mother        SOCIAL HISTORY   reports that he has quit smoking. He quit smokeless tobacco use about 49 years ago. He reports current alcohol use. He reports that he does not use drugs.    CURRENT MEDICATIONS  Previous Medications    ATORVASTATIN (LIPITOR) 80 MG " "TABLET    Take 1 Tablet by mouth every day.    CLOBETASOL (TEMOVATE) 0.05 % OINTMENT    Apply  topically 2 times a day.    LEVOTHYROXINE (SYNTHROID) 50 MCG TAB    Take 50 mcg by mouth every morning on an empty stomach.    NEBIVOLOL (BYSTOLIC) 10 MG TAB    Take 10 mg by mouth every evening.       ALLERGIES  Patient has no known allergies.    PHYSICAL EXAM  BP (!) 208/105   Pulse 93   Temp 36.8 °C (98.2 °F) (Temporal)   Resp 16   Ht 1.88 m (6' 2\")   Wt 74.8 kg (165 lb)   SpO2 91%   BMI 21.18 kg/m²   Constitutional: Well developed, well nourished; No acute distress; Non-toxic appearance.   HENT: Normocephalic, atraumatic; Bilateral external ears normal; Oropharynx with dry mucous membranes; no tongue lacerations or contusions.  Eyes: PERRL, EOMI, Conjunctiva normal. No discharge.   Neck:  Supple, nontender midline; No stridor; No nuchal rigidity.   Lymphatic: No cervical lymphadenopathy noted.   Cardiovascular: Regular rate and rhythm without murmurs, rubs, or gallop.   Thorax & Lungs: No respiratory distress, Decreased breath sounds without wheezing, rales or rhonchi. Nontender chest wall. No crepitus or subcutaneous air  Abdomen: Soft, nontender, bowel sounds normal. No obvious masses; No pulsatile masses; no rebound, guarding, or peritoneal signs.  No obvious incontinence of bowel or bladder.  Skin: Good color; warm and dry without rash or petechia, Skin tear to the right arm.   Back: Nontender, No CVA tenderness.   Extremities: Distal radial, dorsalis pedis, posterior tibial pulses are equal bilaterally; No edema; Nontender calves or saphenous, No cyanosis, No clubbing.   Musculoskeletal: Good range of motion in all major joints. No tenderness to palpation or major deformities noted.   Neurologic: Alert & oriented x 4, clear speech, cranial nerves II through XII intact without facial asymmetry with exception of questionable tongue deviation to the left.  Strengths 5 out of 5 equal bilateral upper and lower " extremities, sensory grossly intact, no drift of upper or lower extremities.  Extraocular movements are intact.  Patient says his name.  He is unable to tell me how old he is.  He follows all commands.  He does not appear to be neglecting anything.  His speech is slightly slurred but intelligible.   are 5 out of 5 and equal bilaterally.  Sensation appears to be intact to light touch throughout.    DIAGNOSTIC STUDIES    EKG/LABS  Results for orders placed or performed during the hospital encounter of 01/21/25   CBC WITH DIFFERENTIAL    Collection Time: 01/21/25 11:56 AM   Result Value Ref Range    WBC 5.8 4.8 - 10.8 K/uL    RBC 4.97 4.70 - 6.10 M/uL    Hemoglobin 15.8 14.0 - 18.0 g/dL    Hematocrit 45.5 42.0 - 52.0 %    MCV 91.5 81.4 - 97.8 fL    MCH 31.8 27.0 - 33.0 pg    MCHC 34.7 32.3 - 36.5 g/dL    RDW 42.4 35.9 - 50.0 fL    Platelet Count 204 164 - 446 K/uL    MPV 9.3 9.0 - 12.9 fL    Neutrophils-Polys 59.70 44.00 - 72.00 %    Lymphocytes 22.80 22.00 - 41.00 %    Monocytes 13.10 0.00 - 13.40 %    Eosinophils 2.90 0.00 - 6.90 %    Basophils 1.20 0.00 - 1.80 %    Immature Granulocytes 0.30 0.00 - 0.90 %    Nucleated RBC 0.00 0.00 - 0.20 /100 WBC    Neutrophils (Absolute) 3.44 1.82 - 7.42 K/uL    Lymphs (Absolute) 1.32 1.00 - 4.80 K/uL    Monos (Absolute) 0.76 0.00 - 0.85 K/uL    Eos (Absolute) 0.17 0.00 - 0.51 K/uL    Baso (Absolute) 0.07 0.00 - 0.12 K/uL    Immature Granulocytes (abs) 0.02 0.00 - 0.11 K/uL    NRBC (Absolute) 0.00 K/uL   COMP METABOLIC PANEL    Collection Time: 01/21/25 11:56 AM   Result Value Ref Range    Sodium 127 (L) 135 - 145 mmol/L    Potassium 4.4 3.6 - 5.5 mmol/L    Chloride 89 (L) 96 - 112 mmol/L    Co2 18 (L) 20 - 33 mmol/L    Anion Gap 20.0 (H) 7.0 - 16.0    Glucose 175 (H) 65 - 99 mg/dL    Bun 13 8 - 22 mg/dL    Creatinine 0.80 0.50 - 1.40 mg/dL    Calcium 9.1 8.5 - 10.5 mg/dL    Correct Calcium 8.7 8.5 - 10.5 mg/dL    AST(SGOT) 29 12 - 45 U/L    ALT(SGPT) 31 2 - 50 U/L     Alkaline Phosphatase 74 30 - 99 U/L    Total Bilirubin 0.7 0.1 - 1.5 mg/dL    Albumin 4.5 3.2 - 4.9 g/dL    Total Protein 6.8 6.0 - 8.2 g/dL    Globulin 2.3 1.9 - 3.5 g/dL    A-G Ratio 2.0 g/dL   PROTHROMBIN TIME    Collection Time: 25 11:56 AM   Result Value Ref Range    PT 13.1 12.0 - 14.6 sec    INR 0.99 0.87 - 1.13   APTT    Collection Time: 25 11:56 AM   Result Value Ref Range    APTT 24.3 (L) 24.7 - 36.0 sec   COD (ADULT)    Collection Time: 25 11:56 AM   Result Value Ref Range    ABO Grouping Only UNDET (A)     Rh Grouping Only POS     Antibody Screen-Cod NEG    TROPONIN    Collection Time: 25 11:56 AM   Result Value Ref Range    Troponin T 18 6 - 19 ng/L   ESTIMATED GFR    Collection Time: 25 11:56 AM   Result Value Ref Range    GFR (CKD-EPI) 90 >60 mL/min/1.73 m 2   MAGNESIUM    Collection Time: 25 11:56 AM   Result Value Ref Range    Magnesium 2.2 1.5 - 2.5 mg/dL   Hemoglobin A1C    Collection Time: 25 11:56 AM   Result Value Ref Range    Glycohemoglobin 6.0 (H) 4.0 - 5.6 %    Est Avg Glucose 126 mg/dL   OSMOLALITY SERUM    Collection Time: 25 11:56 AM   Result Value Ref Range    Osmolality Serum 282 278 - 298 mOsm/kg H2O   EKG (NOW)    Collection Time: 25 12:46 PM   Result Value Ref Range    Report       [facility] Emergency Dept.    Test Date:  2025  Pt Name:    CLAU RIDDLE             Department: ER  MRN:        [id number]                      Room:        08  Gender:     Male                         Technician: [id number]  :        1945                   Requested By:ASHOK BINGHAM  Order #:    [id number]                    Reading MD: Ashok Bingham    Measurements  Intervals                                Axis  Rate:       87                           P:          45  DC:         185                          QRS:        -26  QRSD:       95                           T:          33  QT:         369  QTc:         444    Interpretive Statements  Sinus rhythm rate 87  Normal axis  Normal intervals  No ST elevation or depression  Left atrial enlargement  Borderline left axis deviation  RSR' in V1 or V2, probably normal variant  Compared to ECG 03/23/2024 12:58:28  RSR' in V1 or V2 now present  Electronically Signed On 01- 12:46:34 PST by Thu Bingham     OSMOLALITY URINE    Collection Time: 01/21/25  1:52 PM   Result Value Ref Range    Osmolality Urine 463 300 - 900 mOsm/kg H2O   Basic Metabolic Panel    Collection Time: 01/21/25  2:11 PM   Result Value Ref Range    Sodium 129 (L) 135 - 145 mmol/L    Potassium 4.0 3.6 - 5.5 mmol/L    Chloride 92 (L) 96 - 112 mmol/L    Co2 26 20 - 33 mmol/L    Glucose 163 (H) 65 - 99 mg/dL    Bun 12 8 - 22 mg/dL    Creatinine 0.75 0.50 - 1.40 mg/dL    Calcium 8.7 8.5 - 10.5 mg/dL    Anion Gap 11.0 7.0 - 16.0   ESTIMATED GFR    Collection Time: 01/21/25  2:11 PM   Result Value Ref Range    GFR (CKD-EPI) 92 >60 mL/min/1.73 m 2   Basic Metabolic Panel    Collection Time: 01/21/25  6:45 PM   Result Value Ref Range    Sodium 130 (L) 135 - 145 mmol/L    Potassium 4.0 3.6 - 5.5 mmol/L    Chloride 94 (L) 96 - 112 mmol/L    Co2 26 20 - 33 mmol/L    Glucose 93 65 - 99 mg/dL    Bun 10 8 - 22 mg/dL    Creatinine 0.65 0.50 - 1.40 mg/dL    Calcium 8.7 8.5 - 10.5 mg/dL    Anion Gap 10.0 7.0 - 16.0   ESTIMATED GFR    Collection Time: 01/21/25  6:45 PM   Result Value Ref Range    GFR (CKD-EPI) 96 >60 mL/min/1.73 m 2      I have independently interpreted this EKG    RADIOLOGY/PROCEDURES   The attending emergency physician has independently interpreted the diagnostic imaging associated with this visit and am waiting the final reading from the radiologist.     My preliminary interpretation is a follows: ER MD is reviewed the patient's chest x-ray.  No obvious infiltrate.    Radiologist interpretation:  DX-CHEST-PORTABLE (1 VIEW)   Final Result      1.  Persistent medial right base atelectasis.      CT-CSPINE  "WITHOUT PLUS RECONS   Final Result      No acute fracture or dislocation of the cervical spine.      CT-HEAD W/O   Final Result      No acute intracranial abnormality.                                 MR-BRAIN-WITH & W/O    (Results Pending)       COURSE & MEDICAL DECISION MAKING     ASSESSMENT, COURSE AND PLAN  Care Narrative: Patient presents to the ER with complaint of some slurred speech and \"gibberish\" which occurred around 11:00 today while patient was talking with his wife.  Wife said that he started to look like he was going to pass out and she asked him to sit down on a small bench.  When he sat down she ran out of the room to call 911.  When she came back she found the patient unconscious.  He had not fallen to the floor.  She said he was unconscious for about 10 to 15 minutes although he was \"breathing really fast.\"  She said his eyes were rolling back in his head.  She denies any obvious seizure activity.  Patient has no known seizure history.  He says he drinks a few beers here and there but does not think he is ever had any alcohol withdrawal.  Patient had a history of a stroke with a carotid endarterectomy in the spring of last year.  Patient came in as a code stroke.  Upon evaluation here in the ER he was awake and alert.  His speech was slightly slurred but he followed all commands.  Apparently he had been quite combative and uncooperative in the ambulance and had to be restrained.  Paramedics said he is doing quite a bit better upon arrival here to the ER and seems to be more alert and cooperative/communicative than he was in the ambulance.  Dr. Navarro, neurologist, examined the patient alongside me and felt the patient's symptoms were likely more suggestive of a seizure.  He did not have any focal deficits.  Dr. Navarro recommended a noncontrast CT brain, which was negative.  They recommended loading the patient with Keppra and admitting the patient to the hospital for MRI scan of the brain with and without " "contrast.  Upon reevaluation, patient says he is feeling better.  He is awake and alert.  He is speaking normally.  He is not in any distress.  He says he \"just feels tired.\"  Otherwise no complaints.  At this time patient will need to be hospitalized per neurologist recommendations.  I spoke with Dr. Hanson, hospitalist on-call, and he will kindly evaluate the patient hospitalization.    11:51 AM - Patient seen and examined at Charge Desk. Discussed plan of care, including a diagnostic work up with EKG, labs and imaging. Dr. Navarro (Neurology) present at bedside who recommended CT imaging without contrast.  He says he does not need a CTA of the head or neck at this time.  He also noted that the patient's symptoms sounded more like seizure so he would admit for an MRI scan and load the patient with Keppra.Dr. Navarro recommends admission to neuroscience with every 4 hours neurochecks and MRI of the brain with and without contrast.  He would like Keppra to be continued to 1000 g twice daily.  He said they may defer EEG given event resolution.  He said it is okay to continue aspirin, statin antihypertensives.    STROKE:   Time seen 11:51 AM (Time)     National Institutes of Health (NIH) Stroke Scale   NIH Stroke Scale    Level of Consciousness: Alert, Keenly Responsive  Ask Month and Age: Both Questions Right  Blink Eyes and Squeeze Hands: Performs Both Tasks  Best Gaze: Normal  Visual: No Visual Loss  Facial Palsy: Normal Symmetrical Movements  Motor, Left Arm: No Drift  Motor, Right Arm: No Drift  Motor, Left Leg: No Drift  Motor, Right Leg: No Drift  Limb Ataxia: Absent  Sensory Loss: Normal  Best Language: Mild-to-Moderate Aphasia  Dysarthria: Normal  Extinction and Inattention: No Abnormality    NIHSS Score: 1    No, this patient is not a candidate for thrombolytic therapy because patient has no focal deficits on examination and speech and mental status appear to be improved when compared to his speech and behavior on " "scene and and route per EMS.    12:32 PM - Case Management called to determine if the patient will require full admission compared to observation.     12:45 PM - I discussed the patient's case and the above findings with Case Management who recommended inpatient.     12:56 PM  - I discussed the patient's case and the above findings with Dr. Hanson (Hospitalist) who agrees to evaluate the patient for hospitalization.     12:58 PM - Patient was reevaluated at bedside. Family now present at bedside. I updated them of the plan for admission. They were allowed to ask questions and agree to the plan of care. Patient is back to baseline.  Patient says that he feels fine other than \"feeling tired.\"  Wife noted that the patient lost consciousness for about ten to fifteen minutes but that the patient was breathing really fast. She denies witnessing any seizure activity.  But she says patient's eyes rolled back in his head.  Patient adds that he drinks a few beers sometimes but denies any history of alcohol withdrawal. Denies any history of seizures in the past.    ADDITIONAL PROBLEM LIST  Problem #1: Slurred speech and \"gibberish\" which started about 11:00 this morning  Problem #2: Syncopal episode which occurred slightly after onset of speech disturbance     DISPOSITION AND DISCUSSIONS  I have discussed management of the patient with the following physicians and CHENCHO's:  Dr. Navarro (Neurology), Dr. Hanson (Hospitalist)    Discussion of management with other [facility] or appropriate source(s): Case Management CARISA Johns she says patient meets criteria for inpatient admission.    Escalation of care considered, and ultimately not performed: diagnostic imaging.  Dr. Navarro, neurologist, evaluated the patient at the charge desk upon arrival.  After examining the patient, he decided patient does not need CTA of the head and neck.  He said patient just needs a noncontrasted CT brain.    Barriers to care at this time, including but not limited " to:  None .     Decision tools and prescription drugs considered including, but not limited to: Pain Medications patient denies any pain.  No need for pain medication. .    DISPOSITION:  Patient will be hospitalized by Dr. Hanson (Hospitalist) in guarded condition.     FINAL DIANGOSIS  1. Speech disturbance, unspecified type Acute   2. Seizure (HCC) Acute   3. Hyponatremia Acute     This dictation has been created using voice recognition software. The accuracy of the dictation is limited by the abilities of the software. I expect there may be some errors of grammar and possibly content. I made every attempt to manually correct the errors within my dictation. However, errors related to voice recognition software may still exist and should be interpreted within the appropriate context.      ICorry (Scribe), am scribing for, and in the presence of, Thu Bingham M.D..    Electronically signed by: Corry Ulloa (Scribe), 1/21/2025    IThu M.D. personally performed the services described in this documentation, as scribed by Corry Ulloa in my presence, and it is both accurate and complete.      The note accurately reflects work and decisions made by me.  Thu Bingham M.D.  1/21/2025  7:36 PM

## 2025-01-21 NOTE — PROCEDURES
INPATIENT ROUTINE VIDEO ELECTROENCEPHALOGRAM REPORT    REFERRING PROVIDER: Rip Hanson M.d.  DOS: 1/21/2025  STUDY DURATION: 0 hours and 25 minutes of total recording time.     INDICATION:  Marco Echeverria 79 y.o. male presenting with paroxysmal neurological events    RELEVANT MEDICATIONS/TREATMENTS:   Scheduled Medications   Medication Dose Frequency    senna-docusate  2 Tablet Q EVENING    [START ON 1/22/2025] enoxaparin (LOVENOX) injection  40 mg DAILY AT 1800    levETIRAcetam  1,000 mg BID    Or    levETIRAcetam (Keppra) IV  1,000 mg BID    [START ON 1/22/2025] aspirin  81 mg DAILY    [START ON 1/22/2025] atorvastatin  80 mg DAILY    [START ON 1/22/2025] levothyroxine  50 mcg AM ES    [START ON 1/22/2025] metoprolol tartrate  50 mg BID       TECHNIQUE:   Routine VEEG was set up by a Neurodiagnostic technologist who performed education to the patient and staff. A minimum of 23 electrodes and 23 channel recording was setup and performed by Neurodiagnostic technologist, in accordance with the international 10-20 system. The study was reviewed in bipolar and referential montages. The recording examined the patient in the  awake, drowsy, and sleep state(s).     DESCRIPTION OF THE RECORD:  During wakefulness, the background was continuous and showed a 8 Hz posterior dominant rhythm.  There was reactivity to eye closure/opening.  An anterior-posterior gradient was noted with faster beta frequencies seen anteriorly.  During drowsiness, theta/delta frequencies were seen.    EEG Sleep: Sleep was captured and was characterized by diffuse background delta/theta activity with a loss of myogenic artifact.  N2 sleep transients in the form of sleep spindles and vertex waves were seen in the leads over the central regions.     ICTAL AND INTERICTAL FINDINGS:   1) Left hemispheric mild slowing, unclear if intermittently occurring in runs or continuous due to superimposed muscle artifact.   2) No epileptiform  discharges.  3) No seizures.     ACTIVATION PROCEDURES:   Intermittent Photic stimulation was performed in a stepwise fashion from 1 to 30 Hz and did not elicited any abnormalities on EEG.     EKG: Sampling of the EKG recording showed sinus rhythm    EVENTS:  None    INTERPRETATION:   Abnormal video EEG recording in the awake, drowsy, and sleep state(s):  1) Left hemispheric mild slowing, unclear if intermittently occurring in runs or continuous due to superimposed muscle artifact. This finding suggests left hemispheric focal dysfunction and may be present in the setting of a structural abnormality, postictally, or in other clinical scenarios. Clinical correlation recommended.   2) No epileptiform discharges.  3) No seizures.   4) Mild continuous generalized slowing of the background suggestive of mild nonspecific diffuse cerebral dysfunction.    Note: This EEG does not rule out the possibility of seizures or exclude a diagnosis of epilepsy.  If the clinical suspicion remains high for seizures, a prolonged video EEG recording to capture clinical or subclinical events may be helpful.    Kirsten Espitia MD  Department of Neurology at [facility]  General Neurologist and Epileptologist   of Clinical Neurology, [facility].

## 2025-01-21 NOTE — ASSESSMENT & PLAN NOTE
S/p radiation   Given the concern for seizures, I will check an MRI brain with and without contrast

## 2025-01-22 ENCOUNTER — APPOINTMENT (OUTPATIENT)
Dept: RADIOLOGY | Facility: MEDICAL CENTER | Age: 80
DRG: 101 | End: 2025-01-22
Attending: HOSPITALIST
Payer: COMMERCIAL

## 2025-01-22 ENCOUNTER — PHARMACY VISIT (OUTPATIENT)
Dept: PHARMACY | Facility: MEDICAL CENTER | Age: 80
End: 2025-01-22
Payer: COMMERCIAL

## 2025-01-22 VITALS
OXYGEN SATURATION: 95 % | BODY MASS INDEX: 21.17 KG/M2 | HEART RATE: 59 BPM | SYSTOLIC BLOOD PRESSURE: 145 MMHG | TEMPERATURE: 97.2 F | DIASTOLIC BLOOD PRESSURE: 79 MMHG | WEIGHT: 165 LBS | RESPIRATION RATE: 16 BRPM | HEIGHT: 74 IN

## 2025-01-22 LAB
ALBUMIN SERPL BCP-MCNC: 3.9 G/DL (ref 3.2–4.9)
ALBUMIN/GLOB SERPL: 2.4 G/DL
ALP SERPL-CCNC: 60 U/L (ref 30–99)
ALT SERPL-CCNC: 28 U/L (ref 2–50)
ANION GAP SERPL CALC-SCNC: 11 MMOL/L (ref 7–16)
AST SERPL-CCNC: 58 U/L (ref 12–45)
BILIRUB SERPL-MCNC: 0.6 MG/DL (ref 0.1–1.5)
BUN SERPL-MCNC: 12 MG/DL (ref 8–22)
CALCIUM ALBUM COR SERPL-MCNC: 8.6 MG/DL (ref 8.5–10.5)
CALCIUM SERPL-MCNC: 8.5 MG/DL (ref 8.5–10.5)
CHLORIDE SERPL-SCNC: 96 MMOL/L (ref 96–112)
CHOLEST SERPL-MCNC: 121 MG/DL (ref 100–199)
CO2 SERPL-SCNC: 24 MMOL/L (ref 20–33)
CREAT SERPL-MCNC: 0.9 MG/DL (ref 0.5–1.4)
ERYTHROCYTE [DISTWIDTH] IN BLOOD BY AUTOMATED COUNT: 42 FL (ref 35.9–50)
GFR SERPLBLD CREATININE-BSD FMLA CKD-EPI: 87 ML/MIN/1.73 M 2
GLOBULIN SER CALC-MCNC: 1.6 G/DL (ref 1.9–3.5)
GLUCOSE SERPL-MCNC: 98 MG/DL (ref 65–99)
HCT VFR BLD AUTO: 38.8 % (ref 42–52)
HDLC SERPL-MCNC: 59 MG/DL
HGB BLD-MCNC: 13.6 G/DL (ref 14–18)
LDLC SERPL CALC-MCNC: 49 MG/DL
MAGNESIUM SERPL-MCNC: 1.9 MG/DL (ref 1.5–2.5)
MCH RBC QN AUTO: 31.9 PG (ref 27–33)
MCHC RBC AUTO-ENTMCNC: 35.1 G/DL (ref 32.3–36.5)
MCV RBC AUTO: 90.9 FL (ref 81.4–97.8)
PLATELET # BLD AUTO: 170 K/UL (ref 164–446)
PMV BLD AUTO: 9 FL (ref 9–12.9)
POTASSIUM SERPL-SCNC: 3.9 MMOL/L (ref 3.6–5.5)
PROT SERPL-MCNC: 5.5 G/DL (ref 6–8.2)
RBC # BLD AUTO: 4.27 M/UL (ref 4.7–6.1)
SODIUM SERPL-SCNC: 131 MMOL/L (ref 135–145)
TRIGL SERPL-MCNC: 63 MG/DL (ref 0–149)
TSH SERPL DL<=0.005 MIU/L-ACNC: 3.48 UIU/ML (ref 0.38–5.33)
WBC # BLD AUTO: 5.5 K/UL (ref 4.8–10.8)

## 2025-01-22 PROCEDURE — 92610 EVALUATE SWALLOWING FUNCTION: CPT

## 2025-01-22 PROCEDURE — A9270 NON-COVERED ITEM OR SERVICE: HCPCS | Performed by: PSYCHIATRY & NEUROLOGY

## 2025-01-22 PROCEDURE — A9579 GAD-BASE MR CONTRAST NOS,1ML: HCPCS | Mod: JZ | Performed by: HOSPITALIST

## 2025-01-22 PROCEDURE — RXMED WILLOW AMBULATORY MEDICATION CHARGE: Performed by: INTERNAL MEDICINE

## 2025-01-22 PROCEDURE — 97161 PT EVAL LOW COMPLEX 20 MIN: CPT

## 2025-01-22 PROCEDURE — 70553 MRI BRAIN STEM W/O & W/DYE: CPT

## 2025-01-22 PROCEDURE — 99239 HOSP IP/OBS DSCHRG MGMT >30: CPT | Performed by: INTERNAL MEDICINE

## 2025-01-22 PROCEDURE — 700102 HCHG RX REV CODE 250 W/ 637 OVERRIDE(OP): Performed by: PSYCHIATRY & NEUROLOGY

## 2025-01-22 PROCEDURE — 97165 OT EVAL LOW COMPLEX 30 MIN: CPT

## 2025-01-22 PROCEDURE — 700117 HCHG RX CONTRAST REV CODE 255: Mod: JZ | Performed by: HOSPITALIST

## 2025-01-22 PROCEDURE — 700102 HCHG RX REV CODE 250 W/ 637 OVERRIDE(OP): Performed by: HOSPITALIST

## 2025-01-22 PROCEDURE — 700102 HCHG RX REV CODE 250 W/ 637 OVERRIDE(OP): Performed by: INTERNAL MEDICINE

## 2025-01-22 PROCEDURE — A9270 NON-COVERED ITEM OR SERVICE: HCPCS | Performed by: INTERNAL MEDICINE

## 2025-01-22 PROCEDURE — A9270 NON-COVERED ITEM OR SERVICE: HCPCS | Performed by: HOSPITALIST

## 2025-01-22 RX ORDER — METOPROLOL TARTRATE 50 MG
25 TABLET ORAL 2 TIMES DAILY
Qty: 60 TABLET | Refills: 1 | Status: SHIPPED | OUTPATIENT
Start: 2025-01-22

## 2025-01-22 RX ORDER — LISINOPRIL 10 MG/1
10 TABLET ORAL DAILY
Qty: 100 TABLET | Refills: 3 | Status: SHIPPED | OUTPATIENT
Start: 2025-01-23 | End: 2026-02-27

## 2025-01-22 RX ORDER — HYDRALAZINE HYDROCHLORIDE 20 MG/ML
20 INJECTION INTRAMUSCULAR; INTRAVENOUS EVERY 4 HOURS PRN
Status: DISCONTINUED | OUTPATIENT
Start: 2025-01-22 | End: 2025-01-22 | Stop reason: HOSPADM

## 2025-01-22 RX ORDER — AMLODIPINE BESYLATE 5 MG/1
5 TABLET ORAL DAILY
Qty: 100 TABLET | Refills: 3 | Status: SHIPPED | OUTPATIENT
Start: 2025-01-23 | End: 2026-02-27

## 2025-01-22 RX ORDER — LISINOPRIL 10 MG/1
10 TABLET ORAL
Status: DISCONTINUED | OUTPATIENT
Start: 2025-01-22 | End: 2025-01-22 | Stop reason: HOSPADM

## 2025-01-22 RX ORDER — AMLODIPINE BESYLATE 5 MG/1
5 TABLET ORAL
Status: DISCONTINUED | OUTPATIENT
Start: 2025-01-22 | End: 2025-01-22 | Stop reason: HOSPADM

## 2025-01-22 RX ORDER — LEVETIRACETAM 1000 MG/1
1000 TABLET ORAL 2 TIMES DAILY
Qty: 60 TABLET | Refills: 2 | Status: SHIPPED | OUTPATIENT
Start: 2025-01-22

## 2025-01-22 RX ADMIN — LISINOPRIL 10 MG: 10 TABLET ORAL at 13:54

## 2025-01-22 RX ADMIN — LEVETIRACETAM 1000 MG: 500 TABLET, FILM COATED ORAL at 05:18

## 2025-01-22 RX ADMIN — LEVOTHYROXINE SODIUM 50 MCG: 50 TABLET ORAL at 05:18

## 2025-01-22 RX ADMIN — ATORVASTATIN CALCIUM 80 MG: 80 TABLET, FILM COATED ORAL at 05:18

## 2025-01-22 RX ADMIN — AMLODIPINE BESYLATE 5 MG: 5 TABLET ORAL at 09:13

## 2025-01-22 RX ADMIN — GADOTERIDOL 15 ML: 279.3 INJECTION, SOLUTION INTRAVENOUS at 10:13

## 2025-01-22 RX ADMIN — METOPROLOL TARTRATE 50 MG: 25 TABLET, FILM COATED ORAL at 05:18

## 2025-01-22 RX ADMIN — ASPIRIN 81 MG: 81 TABLET, CHEWABLE ORAL at 05:18

## 2025-01-22 SDOH — ECONOMIC STABILITY: TRANSPORTATION INSECURITY
IN THE PAST 12 MONTHS, HAS THE LACK OF TRANSPORTATION KEPT YOU FROM MEDICAL APPOINTMENTS OR FROM GETTING MEDICATIONS?: NO

## 2025-01-22 SDOH — ECONOMIC STABILITY: TRANSPORTATION INSECURITY
IN THE PAST 12 MONTHS, HAS LACK OF RELIABLE TRANSPORTATION KEPT YOU FROM MEDICAL APPOINTMENTS, MEETINGS, WORK OR FROM GETTING THINGS NEEDED FOR DAILY LIVING?: NO

## 2025-01-22 ASSESSMENT — COGNITIVE AND FUNCTIONAL STATUS - GENERAL
TOILETING: A LITTLE
SUGGESTED CMS G CODE MODIFIER MOBILITY: CJ
SUGGESTED CMS G CODE MODIFIER DAILY ACTIVITY: CJ
MOBILITY SCORE: 22
WALKING IN HOSPITAL ROOM: A LITTLE
WALKING IN HOSPITAL ROOM: A LITTLE
SUGGESTED CMS G CODE MODIFIER MOBILITY: CJ
CLIMB 3 TO 5 STEPS WITH RAILING: A LITTLE
PERSONAL GROOMING: A LITTLE
DRESSING REGULAR LOWER BODY CLOTHING: A LITTLE
HELP NEEDED FOR BATHING: A LITTLE
DRESSING REGULAR UPPER BODY CLOTHING: A LITTLE
DAILY ACTIVITIY SCORE: 20
CLIMB 3 TO 5 STEPS WITH RAILING: A LITTLE
SUGGESTED CMS G CODE MODIFIER DAILY ACTIVITY: CJ
HELP NEEDED FOR BATHING: A LITTLE
DAILY ACTIVITIY SCORE: 22
MOVING TO AND FROM BED TO CHAIR: A LITTLE
MOBILITY SCORE: 21

## 2025-01-22 ASSESSMENT — PAIN DESCRIPTION - PAIN TYPE: TYPE: ACUTE PAIN

## 2025-01-22 ASSESSMENT — GAIT ASSESSMENTS
GAIT LEVEL OF ASSIST: SUPERVISED
DEVIATION: NO DEVIATION
DISTANCE (FEET): 200

## 2025-01-22 ASSESSMENT — LIFESTYLE VARIABLES
EVER HAD A DRINK FIRST THING IN THE MORNING TO STEADY YOUR NERVES TO GET RID OF A HANGOVER: NO
HAVE PEOPLE ANNOYED YOU BY CRITICIZING YOUR DRINKING: NO
HOW MANY TIMES IN THE PAST YEAR HAVE YOU HAD 5 OR MORE DRINKS IN A DAY: 2
HAVE YOU EVER FELT YOU SHOULD CUT DOWN ON YOUR DRINKING: NO
TOTAL SCORE: 0
TOTAL SCORE: 0
AVERAGE NUMBER OF DAYS PER WEEK YOU HAVE A DRINK CONTAINING ALCOHOL: 4
ON A TYPICAL DAY WHEN YOU DRINK ALCOHOL HOW MANY DRINKS DO YOU HAVE: 2
DOES PATIENT WANT TO STOP DRINKING: NO
CONSUMPTION TOTAL: POSITIVE
TOTAL SCORE: 0
ALCOHOL_USE: YES
EVER FELT BAD OR GUILTY ABOUT YOUR DRINKING: NO

## 2025-01-22 ASSESSMENT — SOCIAL DETERMINANTS OF HEALTH (SDOH)
WITHIN THE PAST 12 MONTHS, THE FOOD YOU BOUGHT JUST DIDN'T LAST AND YOU DIDN'T HAVE MONEY TO GET MORE: NEVER TRUE
IN THE PAST 12 MONTHS, HAS THE ELECTRIC, GAS, OIL, OR WATER COMPANY THREATENED TO SHUT OFF SERVICE IN YOUR HOME?: NO
WITHIN THE PAST 12 MONTHS, YOU WORRIED THAT YOUR FOOD WOULD RUN OUT BEFORE YOU GOT THE MONEY TO BUY MORE: NEVER TRUE
WITHIN THE LAST YEAR, HAVE YOU BEEN HUMILIATED OR EMOTIONALLY ABUSED IN OTHER WAYS BY YOUR PARTNER OR EX-PARTNER?: NO
WITHIN THE LAST YEAR, HAVE YOU BEEN KICKED, HIT, SLAPPED, OR OTHERWISE PHYSICALLY HURT BY YOUR PARTNER OR EX-PARTNER?: NO
WITHIN THE LAST YEAR, HAVE YOU BEEN AFRAID OF YOUR PARTNER OR EX-PARTNER?: NO
WITHIN THE LAST YEAR, HAVE TO BEEN RAPED OR FORCED TO HAVE ANY KIND OF SEXUAL ACTIVITY BY YOUR PARTNER OR EX-PARTNER?: NO

## 2025-01-22 ASSESSMENT — PATIENT HEALTH QUESTIONNAIRE - PHQ9
SUM OF ALL RESPONSES TO PHQ9 QUESTIONS 1 AND 2: 0
1. LITTLE INTEREST OR PLEASURE IN DOING THINGS: NOT AT ALL
2. FEELING DOWN, DEPRESSED, IRRITABLE, OR HOPELESS: NOT AT ALL

## 2025-01-22 ASSESSMENT — ACTIVITIES OF DAILY LIVING (ADL): TOILETING: INDEPENDENT

## 2025-01-22 NOTE — ASSESSMENT & PLAN NOTE
I had a discussion with the patient and family present at bedside in the emergency room regarding goals of care, diagnoses, prognosis, and CODE STATUS. We discussed his prognosis and comorbidities.  Patient has advanced age of 79 years.  He has a number of chronic medical problems including hyperlipidemia, history of throat cancer, bilateral carotid artery stenosis

## 2025-01-22 NOTE — PROGRESS NOTES
4 Eyes Skin Assessment Completed by Isabella RN and CARISA Overton.    Head WDL  Ears Redness and Blanching-right ear  Nose WDL  Mouth WDL  Neck WDL  Breast/Chest WDL  Shoulder Blades Bruising   Spine Redness  (R) Arm/Elbow/Hand Bruising  (L) Arm/Elbow/Hand Bruising  Abdomen WDL  Groin WDL  Scrotum/Coccyx/Buttocks WDL  (R) Leg WDL  (L) Leg WDL  (R) Heel/Foot/Toe WDL  (L) Heel/Foot/Toe WDL          Devices In Places Tele Box and SCD's      Interventions In Place Pillows    Possible Skin Injury No    Pictures Uploaded Into Epic N/A  Wound Consult Placed N/A  RN Wound Prevention Protocol Ordered No

## 2025-01-22 NOTE — DISCHARGE PLANNING
Pt discussed in IDT rounds, no anticipated case management needs.  LMSW will continue to follow pt for any barriers to discharge.

## 2025-01-22 NOTE — DISCHARGE SUMMARY
"Discharge Summary    CHIEF COMPLAINT ON ADMISSION  Chief Complaint   Patient presents with    Possible Stroke     BIB EMS after 911 called by wife. Wife reports she was speaking with  and then he started slurring his words and then \"went unconscious.\" Found to be uncooperative by EMS. Reported history of 3 prior strokes. LKW 1100am. FSBS 180.        Reason for Admission  Seizure    Admission Date  1/21/2025    CODE STATUS  Full Code    HPI & HOSPITAL COURSE    79-year-old male with history of dyslipidemia, hypothyroidism, CVA, hypertension and throat cancer who presented 1/21 with possible seizure activities.  Patient cannot remember the episode however according to his wife who was speaking with him and then he started slurring his words and then went unconscious, patient did not bite his tongue and no urinary or bowel incontinence.  No history of stroke.  On admission patient was found to have elevated blood pressure 208/105 with no fever, patient was room air.  EEG did not show seizure activities however showed left hemispheric mild slowing suggestive left hemispheric focal dysfunction, MRI for brain with and without contrast did not show any acute abnormalities with mild cerebellar volume loss, labs showed low hemoglobin 13.6 with normal kidney function and sodium 131, EKG and telemetry shows sinus rhythm with no abnormalities.  Neurologist Dr. Navarro felt the patient had likely episode and recommended to continue Keppra 1000 twice daily also continue aspirin, atorvastatin and blood pressure medication for secondary stroke prevention.    In 7/2020 patient has TSH 11.7, repeat TSH normal, continue home medication levothyroxine 50 mcg daily    Blood pressure was uncontrolled on admission more than 200/100, lisinopril 10 mg, amlodipine 5 mg daily and metoprolol 25 mg twice daily were started with improving and discontinue nebivolol since family not able to afford this medication, will be discontinued on " discharge, encouraged the patient to check his blood pressure closely at home and follow-up with PCP, and seizure clinic.  Encouraged the patient to avoid driving for 3 months until being evaluated by neurologist in clinic, DMV letter was sent.    Therefore, he is discharged in good and stable condition to home with close outpatient follow-up.    The patient met 2-midnight criteria for an inpatient stay at the time of discharge.    Discharge Date  01/22/25      FOLLOW UP ITEMS POST DISCHARGE  Follow-up with PCP for hypertension control    DISCHARGE DIAGNOSES  Principal Problem:    Seizure (HCC) (POA: Yes)  Active Problems:    Hyponatremia (POA: Yes)    Hyperlipidemia (POA: Yes)    Hypothyroidism (POA: Yes)    Bilateral carotid artery stenosis (POA: Yes)    History of throat cancer (POA: Yes)    ACP (advance care planning) (POA: Yes)    Dehydration (POA: Yes)    Primary hypertension (POA: Yes)  Resolved Problems:    * No resolved hospital problems. *      FOLLOW UP  Alex Bunch M.D.  [address] [place] [address]   [address] [address]  [phone]    Follow up in 1 week(s)        MEDICATIONS ON DISCHARGE     Medication List        START taking these medications        Instructions   amLODIPine 5 MG Tabs  Start taking on: January 23, 2025  Commonly known as: Norvasc   Take 1 Tablet by mouth every day.  Dose: 5 mg     levetiracetam 1000 MG tablet  Commonly known as: Keppra   Take 1 Tablet by mouth 2 times a day.  Dose: 1,000 mg     lisinopril 10 MG Tabs  Start taking on: January 23, 2025  Commonly known as: Prinivil   Take 1 Tablet by mouth every day.  Dose: 10 mg     metoprolol tartrate 50 MG Tabs  Commonly known as: Lopressor   Take 0.5 Tablets by mouth 2 times a day.  Dose: 25 mg            CONTINUE taking these medications        Instructions   aspirin 81 MG Chew chewable tablet  Commonly known as: Asa   Chew 81 mg every day.  Dose: 81 mg     atorvastatin 80 MG tablet  Commonly known as: Lipitor   Take 1  Tablet by mouth every day.  Dose: 80 mg     clobetasol 0.05 % Oint  Commonly known as: Temovate   Apply 1 Application topically every day.  Dose: 1 Application     levothyroxine 50 MCG Tabs  Commonly known as: Synthroid   Take 50 mcg by mouth every morning on an empty stomach.  Dose: 50 mcg            STOP taking these medications      nebivolol 10 MG Tabs  Commonly known as: Bystolic              Allergies  No Known Allergies    DIET  Orders Placed This Encounter   Procedures    Diet Order Diet: Cardiac     Standing Status:   Standing     Number of Occurrences:   1     Order Specific Question:   Diet:     Answer:   Cardiac [6]       ACTIVITY  As tolerated.  Weight bearing as tolerated    CONSULTATIONS  Neurology    PROCEDURES  None    LABORATORY  Lab Results   Component Value Date    SODIUM 131 (L) 01/21/2025    POTASSIUM 3.9 01/21/2025    CHLORIDE 96 01/21/2025    CO2 24 01/21/2025    GLUCOSE 98 01/21/2025    BUN 12 01/21/2025    CREATININE 0.90 01/21/2025        Lab Results   Component Value Date    WBC 5.5 01/21/2025    HEMOGLOBIN 13.6 (L) 01/21/2025    HEMATOCRIT 38.8 (L) 01/21/2025    PLATELETCT 170 01/21/2025        Total time of the discharge process exceeds 35 minutes.

## 2025-01-22 NOTE — THERAPY
"Speech Language Pathology   Clinical Swallow Evaluation     Patient Name: Marco Echeverria  AGE:  79 y.o., SEX:  male  Medical Record #: [id number]  Date of Service: 1/22/2025      History of Present Illness  79 y.o. male BIB EMS after 911 called by wife. Wife reports she was speaking with  and then he started slurring his words and then \"went unconscious.\" Admitted for likely seizure event.     PMHx: hyperlipidemia, hypothyroidism, throat cancer, hypertension, and history of cerebrovascular accident, malignant neoplasm of tonsil (2011), tongue cancer (2011), late effect of radiation, and radiation caries.    CT Head 1/21:  No acute intracranial abnormality.    MRI Brain 1/22: results pending    XR Chest 1/21:  1.  Persistent medial right base atelectasis.    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 There was also a finding of a large, partially obstructive cricopharyngeal bar.       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 Has seen OP SLP for treatment and had OP MBSS approximately 1 year ago. Reports he is able to chew thoroughly and eat more intentionally and slowly and when he uses these strategies he doesn't have trouble eating. Denies PNA or respiratory concerns in last year. Denies choking events while eating. Endorses occassional coughing especially if he forgets to go slow or feels rushed.       Oral Mechanism Evaluation:  Dentition: Full dentures   Facial Symmetry: Equal  Facial Sensation: Equal     Labial Observations: WFL (oral " aperature appears narrow, >2 fingers widths for max opening)   Lingual Observations: Midline  Motor Speech: WFL         Laryngeal Function:  Secretion Management: Adequate  Voice Quality: WFL  Cough: Perceptually WNL       Subjective  agreeable to session      Assessment  Current Method of Nutrition: Oral diet (cardiac)  Positioning: Hernandez's (60-90 degrees)  Bolus Administration: SLP, Patient    O2 Delivery Device: None - Room Air  Factor(s) Affecting Performance: None       Swallowing Trials:  Swallowing Trials  Ice: WFL  Thin Liquid (TN0): Impaired  Liquidised (LQ3): WFL  Easy to Chew (EC7): WFL      Comments: Patient seen for swallow evaluation. Patient with adequate bolus acceptance, manipulation, and transit. No oral residue noted. Coughing was noted with large consecutive sips of thin liquid by cup. No coughing with rate/portion control strategies (small single sips, no straws). Intermittent audible swallow noted concerning for reduced coordination.      Education: History of swallow dysfunction reviewed. Patient reports short stent of outpatient therapy for swallow, but he endorses poor buy in and reports he has not continued exercises. Reviewed rationale for swallow exercises in setting of progressive radiation fibrosis as best option maintain and hopefully prolong functional swallow abilities. Encouraged return to OP swallow therapy and home practice. Patient agreeable.      Clinical Impressions  Patient presents with increased risk for aspiration in the setting of known pharyngeal phase dysphagia (MBSS completed in January 2021). Dysphagia is in the setting of radiation fibrosis following head/neck cancer. Given progressive nature of radiation fibrosis, decline in swallow function is likely. Patient education regarding progressive nature of radiation fibrosis provided. Close monitoring of PO tolerance and nutrition status warranted with low threshold for repeat instrumental evaluation should concerns  arise. Patient reports good tolerance of food at home, no recent PNA, and no concerns for swallow at this time. Patient did demonstrate 1x cough with large sips of liquid. Rate/portion control strategies of small single sips appear to mitigate aspiration risk. Recommend continue home diet of regular solids (self select softer foods) and thin liquids with strict aspiration precautions. Recommend return to OP SLP for swallow intervention to retrain swallow exercises with goal to maintain and hopefully prolong functional swallow abilities.      Recommendations  Diet Consistency: regular (self select softer solids), thin liquids  Instrumentation: Instrumental swallow study pending clinical progress  Medication: Whole with liquid, Cut large pills, One pill at a time  Supervision: Distant supervision - check on patient 2-3 times per meal  Positioning: Fully upright and midline during oral intake  Risk Management : Small bites/sips, Alternate bites and sips, Slow rate of intake, No straws, Physical mobility, as tolerated (2 bites per swallow)  Oral Care: BID         SLP Treatment Plan  Treatment Plan: Dysphagia Treatment  SLP Frequency: 2x Per Week  Estimated Duration: Until Therapy Goals Met      Anticipated Discharge Needs  Discharge Recommendations: Recommend outpatient speech therapy services   Therapy Recommendations Upon DC: Dysphagia Training        Patient / Family Goals  Patient / Family Goal #1: to discharge home  Short Term Goals  Short Term Goal # 1: Patient will tolerate regular soilds and thin liquids without signs of airway invasion or changes in respiratory status      Val Rivera, SLP

## 2025-01-22 NOTE — PROGRESS NOTES
Monitor Summary: SB, SR 55-62, SD 0.20, QRS 0.07, QT 0.46, with rare PACs per strip from monitor room.

## 2025-01-22 NOTE — PROGRESS NOTES
Stroke neuro note    MRI brain w/wo contrast reviewed.  There is no evidence of acute stroke or enhancing structural lesion.  Clinical suspicion remains high for possible seizure event.   Recommend continuing keppra 1000mg BID.  On ASA, statin and anti-HTN for secondary stroke prevention.  If at baseline, may be discharged from Neurology perspective.  Follow up with [facility] General Neurology clinic.  Please reconsult Stroke Neurology with any additional questions or concerns.    Sammy Navarro MD  Stroke Neurology

## 2025-01-22 NOTE — THERAPY
Physical Therapy   Initial Evaluation     Patient Name: Marco Echeverria  Age:  79 y.o., Sex:  male  Medical Record #: [id number]  Today's Date: 1/22/2025     Precautions  Precautions: (P) Fall Risk;Swallow Precautions;Other (See Comments)  Comments: (P) Seizure precautions    Assessment  Patient is 79 y.o. male admitted on 1/21/2025 for episode of LOC, confusion and slurred speech     Currently been managed for seizure, dehydration, hyponatremia    MRI Brain 1/22: (-) Acute abnormality     PMH: malignant neoplasm of tonsil, tongue cancer, cervical radiation, L CEA (March 2024), HLD, HTN, hypothyroidism, CVA (x3 times), B/L carotid artery stenosis     Patient seen for PT evaluation. Patient in bed, agreeable for the session. Able to demonstrate functional mobility tasks as detailed below. Currently appears to be close to baseline level of functional mobility. Anticipate no further PT needs at this time.     Recommend nursing to encourage OOB to chair for meals and ambulate in the hallway multiple times during the day as able.     Plan    Physical Therapy Initial Treatment Plan   Duration: (P) Discharge Needs Only    DC Equipment Recommendations: (P) None  Discharge Recommendations: (P) Anticipate that the patient will have no further physical therapy needs after discharge from the hospital     Objective     01/22/25 1232   Time In/Time Out   Therapy Start Time 1216   Therapy End Time 1232   Total Therapy Time 16   Initial Contact Note    Initial Contact Note Order Received and Verified, Physical Therapy Evaluation in Progress with Full Report to Follow.   Precautions   Precautions Fall Risk;Swallow Precautions;Other (See Comments)   Comments Seizure precautions   Vitals   Pulse (!) 59   Patient BP Position Sitting  (EOB)   Blood Pressure  (!) 197/98   O2 Delivery Device None - Room Air   Vitals Comments Post activity, seated EOB: /102, HR 60. RN and CNA made aware of elevated BP   Pain   Pain Scales 0 to 10  Scale    Intervention Declines   Prior Living Situation   Prior Services None   Housing / Facility 1 [address] House   Steps Into Home 1   Steps In Home 0   Rail None   Equipment Owned None   Lives with - Patient's Self Care Capacity Spouse   Comments Patient mentioned that his wife can assist if needed.   Prior Level of Functional Mobility   Bed Mobility Independent   Transfer Status Independent   Ambulation Independent   Ambulation Distance Community   Assistive Devices Used None   Stairs Independent   History of Falls   History of Falls Yes   Date of Last Fall   (H/O 1 fall PTA, associated with syncope)   Cognition    Cognition / Consciousness X   Speech/ Communication Hard of Hearing   Level of Consciousness Alert   Passive ROM Lower Body   Passive ROM Lower Body WDL   Active ROM Lower Body    Active ROM Lower Body  WDL   Strength Lower Body   Lower Body Strength  WDL   Sensation Lower Body   Lower Extremity Sensation   WDL   Comments Denies any numbness/tingling in LE   Lower Body Muscle Tone   Lower Body Muscle Tone  WDL   Coordination Lower Body    Coordination Lower Body  WDL   Comments Able to perform heel to shin, alternate ankle movements B/L   Vision   Vision Comments Glasses-all the time   Other Treatments   Other Treatments Provided Patient was educated about stroke, symptoms of stroke, risk factors of stroke, importance of call 911 sooner than later. Reinforced checking BP daily, taking meds as prescribed and following up with doctor as recommended.   Balance Assessment   Sitting Balance (Static) Normal   Sitting Balance (Dynamic) Normal   Standing Balance (Static) Fair +   Standing Balance (Dynamic) Fair +   Weight Shift Sitting Normal   Weight Shift Standing Normal   Comments Seated EOB; Standing W/O AD   Bed Mobility    Supine to Sit Supervised   Sit to Supine Supervised   Scooting Supervised   Rolling Supervised   Comments HOB flat, without use of rails, towards L side   Gait Analysis   Gait Level Of  Assist Supervised   Assistive Device None   Distance (Feet) 200   Deviation No deviation   # of Stairs Climbed 4  (U/L rail)   Level of Assist with Stairs Supervised   Comments Steady pace, no loss of balance   Functional Mobility   Sit to Stand Supervised   Bed, Chair, Wheelchair Transfer Refused   Mobility Bed-EOB-sit-stand-ambulate in the room & hallway-navigate stairs-EOB-bed   Comments W/O AD   6 Clicks Assessment - How much HELP from from another person do you currently need... (If the patient hasn't done an activity recently, how much help from another person do you think he/she would need if he/she tried?)   Turning from your back to your side while in a flat bed without using bedrails? 4   Moving from lying on your back to sitting on the side of a flat bed without using bedrails? 4   Moving to and from a bed to a chair (including a wheelchair)? 4   Standing up from a chair using your arms (e.g., wheelchair, or bedside chair)? 4   Walking in hospital room? 3   Climbing 3-5 steps with a railing? 3   6 clicks Mobility Score 22   Patient / Family Goals    Patient / Family Goal #1 To return home   Education Group   Education Provided Role of Physical Therapist   Role of Physical Therapist Patient Response Patient;Acceptance;Explanation;Verbal Demonstration   Physical Therapy Initial Treatment Plan    Duration Discharge Needs Only   Anticipated Discharge Equipment and Recommendations   DC Equipment Recommendations None   Discharge Recommendations Anticipate that the patient will have no further physical therapy needs after discharge from the hospital   Interdisciplinary Plan of Care Collaboration   IDT Collaboration with  Nursing;Occupational Therapist;Certified Nursing Assistant   Patient Position at End of Therapy In Bed;Bed Alarm On;Call Light within Reach;Tray Table within Reach;Phone within Reach  (RN and CNA made aware that bed alarm box need to be replaced)   Session Information   Date / Session Number   1/22-Eval done, DC needs only

## 2025-01-22 NOTE — DISCHARGE INSTRUCTIONS
Please follow up with primary care physician.  Please follow up with neurology, please call [phone] if you don't hear back.  Please follow up with outpatient speech pathology, obtain referrals from primary care physician.   Please log your blood pressures and present to your next follow up appointment.  Dr. Gunner Goodman worked with you during your course of stay.

## 2025-01-22 NOTE — CARE PLAN
The patient is Stable - Low risk of patient condition declining or worsening    Shift Goals  Clinical Goals: safety, seizure precautions  Patient Goals: sleep, discharge  Family Goals: roberto    Progress made toward(s) clinical / shift goals:      Problem: Seizure Precautions  Goal: Implementation of seizure precautions  Outcome: Progressing   Seizure precautions in place (i.e. padded side rails, suction and supplemental oxygen available at bedside). PRNs available for breakthrough seizures.      Problem: Fall Risk  Goal: Patient will remain free from falls  Outcome: Progressing   Frame alarm in place. Pt calls for assistance and is provided appropriate assistive devices when needed. Treaded socks used when ambulating.      Patient is not progressing towards the following goals:    N/A

## 2025-01-22 NOTE — THERAPY
Occupational Therapy   Initial Evaluation     Patient Name: Marco Echeverria  Age:  79 y.o., Sex:  male  Medical Record #: [id number]  Today's Date: 1/22/2025     Precautions  Precautions: Fall Risk, Swallow Precautions    Assessment  Patient is 79 y.o. male admitted for CVA w/u after episode of slurred speech, confusion, and LOC; found to have dehydration and hyponatremia. Reports all symptoms have resolved and he is back at his functional baseline; demo'd some RUE gross motor deficits, but pt reports this is baseline 2/2 R shoulder injury. PMHx of CVA x3, throat CA s/p radiation, and s/p L CEA. Completed ADLs/txfs with SPV and functional ambulation w/o AD and SPV. Pt reports lives with wife who is able to assist PRN. Patient will not be actively followed for occupational therapy services at this time, however may be seen if requested by physician for 1 more visit within 30 days to address any discharge or equipment needs.     Plan    Occupational Therapy Initial Treatment Plan   Duration: Discharge Needs Only    DC Equipment Recommendations: Grab Bar(s) in Tub / Shower  Discharge Recommendations: Anticipate that the patient will have no further occupational therapy needs after discharge from the hospital (as long as wife can assist PRN)      Objective     01/22/25 1146   Prior Living Situation   Prior Services Home-Independent   Housing / Facility 1 Story House   Steps Into Home 1   Bathroom Set up Walk In Shower;Shower Chair   Equipment Owned Tub / Shower Seat   Lives with - Patient's Self Care Capacity Spouse   Comments Pt reports lives with wife who is able to assist PRN.   Prior Level of ADL Function   Self Feeding Independent   Grooming / Hygiene Independent   Bathing Independent   Dressing Independent   Toileting Independent   Prior Level of IADL Function   Medication Management Independent   Laundry Independent   Kitchen Mobility Independent   Finances Independent   Home Management Independent   Shopping  Independent   Prior Level Of Mobility Independent Without Device in Home   Driving / Transportation Driving Independent   Occupation (Pre-Hospital Vocational) Retired Due To Age   Precautions   Precautions Fall Risk;Swallow Precautions   Vitals   O2 Delivery Device None - Room Air   Pain 0 - 10 Group   Therapist Pain Assessment During Activity;Post Activity Pain Same as Prior to Activity;Nurse Notified  (no c/o pain)   Cognition    Cognition / Consciousness WDL   Level of Consciousness Alert   Comments pleasant and cooperative   Passive ROM Upper Body   Passive ROM Upper Body WDL   Active ROM Upper Body   Active ROM Upper Body  X   Dominant Hand Right   Comments R shoulder limited at baseline from previous injury   Strength Upper Body   Upper Body Strength  WDL   Sensation Upper Body   Upper Extremity Sensation  WDL   Coordination Upper Body   Coordination X   Comments GM impaired during nose to finger, overshooting   Balance Assessment   Sitting Balance (Static) Good   Sitting Balance (Dynamic) Fair +   Standing Balance (Static) Fair +   Standing Balance (Dynamic) Fair   Weight Shift Sitting Good   Weight Shift Standing Good   Comments w/o AD   Bed Mobility    Supine to Sit Supervised   Sit to Supine Supervised   Scooting Supervised   Comments HOB flat   ADL Assessment   Eating Supervision   Grooming Supervision   Lower Body Dressing Supervision   Toileting Supervision   mRS Prior to admission   Prior to admission mRS 0   Modified [place] (mRS)   Modified [place] Score 0  (reports is back at baseline)   Functional Mobility   Sit to Stand Supervised   Bed, Chair, Wheelchair Transfer Supervised   Toilet Transfers Supervised   Transfer Method Stand Step   Mobility w/o AD in room   Visual Perception   Comments reports no changes to vision   Edema / Skin Assessment   Edema / Skin  Not Assessed   Activity Tolerance   Comments c/o fatigue   Education Group   Education Provided Role of Occupational Therapist   Role of  Occupational Therapist Patient Response Patient;Acceptance;Explanation;Verbal Demonstration;Reinforcement Needed

## 2025-01-23 NOTE — PROGRESS NOTES
Patient arrived to discharge lounge. PIV removed, tip intact. Discussed discharge paperwork and medications with patient and family. Answered all questions. No home meds to return,  M2B given to patient. Patient ambulated out, personal belongings in hand.

## 2025-01-23 NOTE — CARE PLAN
The patient is Stable - Low risk of patient condition declining or worsening    Shift Goals  Clinical Goals: monitor for seizures  Patient Goals: updates  Family Goals: roberto    Progress made toward(s) clinical / shift goals:    Problem: Neuro Status  Goal: Neuro status will remain stable or improve  Description: Target End Date:  Prior to discharge or change in level of care  Document on Neuro assessment in the Assessment flowsheet  1.  Assess and monitor neurologic status per provider order/protocol/unit policy  2.  Assess level of consciousness and orientation  8.  Identify changes in neuro status and report to provider for testing/treatment orders  Outcome: Progressing     Problem: Seizure Precautions  Goal: Implementation of seizure precautions  Description: Target End Date:  Prior to discharge or change in level of care  1.  Padded side rails up at all times  2.  Suction equipment and oxygen delivery system at bedside  3.  Continuous pulse oximeter in use  4.  Implement fall precautions, bed alarm on, bed in lowest position  5.  IV access (per order)  6.  Provide low stimulus environment, avoid exposure to triggers  7.  Instruct patient to use call light/seizure button if having warning signs of impending seizure  Outcome: Progressing    Patient is not progressing towards the following goals:

## 2025-04-28 RX ORDER — CLOBETASOL PROPIONATE 0.5 MG/G
1 OINTMENT TOPICAL BID
Qty: 60 | Refills: 3 | Status: ERX

## 2025-06-10 ENCOUNTER — OFFICE VISIT (OUTPATIENT)
Facility: MEDICAL CENTER | Age: 80
End: 2025-06-10
Payer: COMMERCIAL

## 2025-06-10 ENCOUNTER — HOSPITAL ENCOUNTER (OUTPATIENT)
Dept: RADIOLOGY | Facility: MEDICAL CENTER | Age: 80
End: 2025-06-10
Attending: NURSE PRACTITIONER

## 2025-06-10 VITALS
TEMPERATURE: 98.8 F | BODY MASS INDEX: 20.22 KG/M2 | SYSTOLIC BLOOD PRESSURE: 138 MMHG | HEART RATE: 72 BPM | HEIGHT: 74 IN | DIASTOLIC BLOOD PRESSURE: 78 MMHG | WEIGHT: 157.52 LBS | OXYGEN SATURATION: 97 %

## 2025-06-10 DIAGNOSIS — Z85.810 HISTORY OF TONGUE CANCER: ICD-10-CM

## 2025-06-10 DIAGNOSIS — I65.22 CAROTID ARTERY STENOSIS, ASYMPTOMATIC, LEFT: ICD-10-CM

## 2025-06-10 DIAGNOSIS — I65.21 CAROTID ARTERY STENOSIS, ASYMPTOMATIC, RIGHT: Primary | ICD-10-CM

## 2025-06-10 DIAGNOSIS — I10 PRIMARY HYPERTENSION: ICD-10-CM

## 2025-06-10 PROCEDURE — 3075F SYST BP GE 130 - 139MM HG: CPT | Performed by: SURGERY

## 2025-06-10 PROCEDURE — 3078F DIAST BP <80 MM HG: CPT | Performed by: SURGERY

## 2025-06-10 PROCEDURE — 99213 OFFICE O/P EST LOW 20 MIN: CPT | Performed by: SURGERY

## 2025-06-10 RX ORDER — AMLODIPINE BESYLATE 5 MG/1
2.5 TABLET ORAL DAILY
Qty: 100 TABLET | Refills: 3 | Status: CANCELLED | OUTPATIENT
Start: 2025-06-10 | End: 2026-07-15

## 2025-06-10 RX ORDER — LISINOPRIL 10 MG/1
20 TABLET ORAL DAILY
Qty: 100 TABLET | Refills: 3 | Status: SHIPPED | OUTPATIENT
Start: 2025-06-10 | End: 2026-07-15

## 2025-06-10 ASSESSMENT — FIBROSIS 4 INDEX: FIB4 SCORE: 5.09

## 2025-06-10 NOTE — PROGRESS NOTES
VASCULAR SURGERY                 Clinic Progress Note  _________________________________    Vitals for Today's Visit  There were no vitals taken for this visit.  _________________________________          6/10/2025      This patient returns to my office to discuss his carotid disease.  As you recall he underwent carotid endarterectomy 3 years ago on the right side.  In March 2024 he underwent left carotid endarterectomy.  He reports that in January he had a syncopal event.  He was seen in the emergency room and had his 's license revoked.  Since that time he reports he has been trying to get his 's license reinstated.  This included a CTA of his neck which demonstrates no significant stenosis.  The endarterectomy sites look good.  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  I do not believe it is of any consequence.    He is neurologically intact          Impression-carotid artery disease  Status post bilateral endarterecties       Continue surveillance program follow-up 1 year carotid duplex    From my standpoint the patient can drive    Aly Tarango MD  [facility]  [phone] [address] [facility] [address], [place] [address]

## (undated) DEVICE — SOD. CHL. INJ. 0.9% 250 ML - (36/CA 50CA/PF)

## (undated) DEVICE — SUTURE 2-0 ETHILON FS - (36/BX) 18 INCH

## (undated) DEVICE — BLADE SURGICAL #11 - (50/BX)

## (undated) DEVICE — SUTURE 6-0 PROLENE C-1 D/A 24 (36PK/BX)"

## (undated) DEVICE — GLOVE BIOGEL SZ 8 SURGICAL PF LTX - (50PR/BX 4BX/CA)

## (undated) DEVICE — LACTATED RINGERS INJ 1000 ML - (14EA/CA 60CA/PF)

## (undated) DEVICE — STAPLER SKIN DISP - (6/BX 10BX/CA) VISISTAT

## (undated) DEVICE — SODIUM CHL. INJ. 0.9% 500ML (24EA/CA 50CA/PF)

## (undated) DEVICE — VESSELOOP MAXI BLUE STERILE- SURG-I-LOOP (10EA/BX)

## (undated) DEVICE — SUTURE 3-0 VICRYL PLUS SH - 8X 18 INCH (12/BX)

## (undated) DEVICE — SPONGE DRAIN 4 X 4IN 6-PLY - (2/PK25PK/BX12BX/CS)

## (undated) DEVICE — MASK ANESTHESIA ADULT  - (100/CA)

## (undated) DEVICE — ELECTRODE 850 FOAM ADHESIVE - HYDROGEL RADIOTRNSPRNT (50/PK)

## (undated) DEVICE — SUTURE 2-0 VICRYL PLUS CT-1 - 8 X 18 INCH(12/BX)

## (undated) DEVICE — SUTURE 3-0 SILK 12 X 18 IN - (36/BX)

## (undated) DEVICE — SOD. CHL. INJ. 0.9% 1000 ML - (14EA/CA 60CA/PF)

## (undated) DEVICE — GOWN SURGEONS LARGE - (32/CA)

## (undated) DEVICE — DRESSING TRANSPARENT FILM TEGADERM 4 X 4.75" (50EA/BX)"

## (undated) DEVICE — SUTURE CV

## (undated) DEVICE — PROTECTOR ULNA NERVE - (36PR/CA)

## (undated) DEVICE — GOWN WARMING STANDARD FLEX - (30/CA)

## (undated) DEVICE — SUTURE GENERAL

## (undated) DEVICE — SET LEADWIRE 5 LEAD BEDSIDE DISPOSABLE ECG (1SET OF 5/EA)

## (undated) DEVICE — SUTURE 6-0 PROLENE BV-1 D/A 24 (36PK/BX)"

## (undated) DEVICE — SUTURE 3-0 SILK FS 18 INCH - (12PK/BX)

## (undated) DEVICE — CHLORAPREP 26 ML APPLICATOR - ORANGE TINT(25/CA)

## (undated) DEVICE — SUCTION INSTRUMENT YANKAUER BULBOUS TIP W/O VENT (50EA/CA)

## (undated) DEVICE — TOWELS CLOTH SURGICAL - (4/PK 20PK/CA)

## (undated) DEVICE — GLOVE BIOGEL SZ 6 PF LATEX - (50EA/BX 4BX/CA)

## (undated) DEVICE — TUBING CLEARLINK DUO-VENT - C-FLO (48EA/CA)

## (undated) DEVICE — SET EXTENSION WITH 2 PORTS (48EA/CA) ***PART #2C8610 IS A SUBSTITUTE*****

## (undated) DEVICE — DRAIN J-VAC 7MM FLAT - (10EA/CA)

## (undated) DEVICE — DRAPE MAGNETIC (INSTRA-MAG) - (30/CA)

## (undated) DEVICE — ELECTRODE DUAL RETURN W/ CORD - (50/PK)

## (undated) DEVICE — KIT ANESTHESIA W/CIRCUIT & 3/LT BAG W/FILTER (20EA/CA)

## (undated) DEVICE — GLOVE BIOGEL PI ORTHO SZ 7.5 PF LF (40PR/BX)

## (undated) DEVICE — HEAD HOLDER JUNIOR/ADULT

## (undated) DEVICE — SLEEVE, VASO, THIGH, MED

## (undated) DEVICE — SPONGE GAUZESTER 4 X 4 4PLY - (128PK/CA)

## (undated) DEVICE — SUTURE 2-0 SILK 12 X 18" (36PK/BX)"

## (undated) DEVICE — PACK AV FISTULA (4EA/CA)

## (undated) DEVICE — DECANTER FLD BLS - (50/CA)

## (undated) DEVICE — RESERVOIR SUCTION 100 CC - SILICONE (20EA/CA)

## (undated) DEVICE — SENSOR OXIMETER ADULT SPO2 RD SET (20EA/BX)

## (undated) DEVICE — WIPE INSTRUMENT MICROWIPE (20EA/SP)

## (undated) DEVICE — SYRINGE 30 ML LL (56/BX)

## (undated) DEVICE — KIT RADIAL ARTERY 20GA W/MAX BARRIER AND BIOPATCH  (5EA/CA) #10740 IS FOR THE SET RADIAL ARTERIAL

## (undated) DEVICE — SODIUM CHL IRRIGATION 0.9% 1000ML (12EA/CA)

## (undated) DEVICE — CANISTER SUCTION 3000ML MECHANICAL FILTER AUTO SHUTOFF MEDI-VAC NONSTERILE LF DISP  (40EA/CA)

## (undated) DEVICE — POLY UMBILICAL TAPE 1/8X30 - (36/BX)

## (undated) DEVICE — KIT SURGIFLO W/OUT THROMBIN - (6EA/CA)

## (undated) DEVICE — WIPE SURGICAL INSTRUMENT VISIWIPE 2-7/8IN X 2-7/8IN (20EA/PK)

## (undated) DEVICE — SUTURE 4-0 30CM STRATAFIX SPIRAL PS-2 (12EA/BX)

## (undated) DEVICE — DRAPE SURGICAL U 77X120 - (10/CA)

## (undated) DEVICE — COVER LIGHT HANDLE ALC PLUS DISP (18EA/BX)

## (undated) DEVICE — SENSOR SPO2 NEO LNCS ADHESIVE (20/BX) SEE USER NOTES

## (undated) DEVICE — INTRAOP NEURO IN OR 1:1 PER 15 MIN

## (undated) DEVICE — SHUNT CAROTID FLEXCEL 14.5CM - 5/BX

## (undated) DEVICE — PACK MINOR BASIN - (2EA/CA)

## (undated) DEVICE — CATHETER FOLEY ROBINSON 16FR 16IN STRL (12EA/CA)

## (undated) DEVICE — CLIP MED INTNL HRZN TI ESCP - (25/BX)

## (undated) DEVICE — SUTURE 5-0 PROLENE BLUE C-1 HS 1 X 30 (36EA/BX)"

## (undated) DEVICE — CLIP SM INTNL HRZN TI ESCP LGT - (24EA/PK 25PK/BX)

## (undated) DEVICE — Device

## (undated) DEVICE — VESSELOOP MINI BLUE STERILE - SURG-I-LOOP (10EA/BX)

## (undated) DEVICE — NEPTUNE 4 PORT MANIFOLD - (20/PK)

## (undated) DEVICE — DRAPE IOBAN II INCISE 23X17 - (10EA/BX 4BX/CA)

## (undated) DEVICE — SUTURE 10-0 NYLON CU-8 (12PK/BX)

## (undated) DEVICE — SUTURE 0 SILK TIES (36PK/BX)

## (undated) DEVICE — GLOVE SIZE 6.5  SURGEON ACCELERATOR FREE GREEN (50PR/BX)